# Patient Record
Sex: MALE | Race: WHITE | NOT HISPANIC OR LATINO | URBAN - METROPOLITAN AREA
[De-identification: names, ages, dates, MRNs, and addresses within clinical notes are randomized per-mention and may not be internally consistent; named-entity substitution may affect disease eponyms.]

---

## 2018-11-04 ENCOUNTER — EMERGENCY (EMERGENCY)
Facility: HOSPITAL | Age: 28
LOS: 1 days | Discharge: HOME | End: 2018-11-04
Admitting: EMERGENCY MEDICINE
Payer: COMMERCIAL

## 2018-11-04 VITALS
TEMPERATURE: 98 F | RESPIRATION RATE: 16 BRPM | HEART RATE: 98 BPM | OXYGEN SATURATION: 100 % | DIASTOLIC BLOOD PRESSURE: 90 MMHG | SYSTOLIC BLOOD PRESSURE: 140 MMHG

## 2018-11-04 LAB
ALBUMIN SERPL ELPH-MCNC: 4.6 G/DL — SIGNIFICANT CHANGE UP (ref 3.3–5)
ALP SERPL-CCNC: 75 U/L — SIGNIFICANT CHANGE UP (ref 40–120)
ALT FLD-CCNC: 25 U/L — SIGNIFICANT CHANGE UP (ref 4–41)
AST SERPL-CCNC: 20 U/L — SIGNIFICANT CHANGE UP (ref 4–40)
BASOPHILS # BLD AUTO: 0.04 K/UL — SIGNIFICANT CHANGE UP (ref 0–0.2)
BASOPHILS NFR BLD AUTO: 0.5 % — SIGNIFICANT CHANGE UP (ref 0–2)
BILIRUB SERPL-MCNC: 0.6 MG/DL — SIGNIFICANT CHANGE UP (ref 0.2–1.2)
BUN SERPL-MCNC: 14 MG/DL — SIGNIFICANT CHANGE UP (ref 7–23)
CALCIUM SERPL-MCNC: 9.7 MG/DL — SIGNIFICANT CHANGE UP (ref 8.4–10.5)
CHLORIDE SERPL-SCNC: 106 MMOL/L — SIGNIFICANT CHANGE UP (ref 98–107)
CO2 SERPL-SCNC: 23 MMOL/L — SIGNIFICANT CHANGE UP (ref 22–31)
CREAT SERPL-MCNC: 0.97 MG/DL — SIGNIFICANT CHANGE UP (ref 0.5–1.3)
EOSINOPHIL # BLD AUTO: 0.15 K/UL — SIGNIFICANT CHANGE UP (ref 0–0.5)
EOSINOPHIL NFR BLD AUTO: 1.7 % — SIGNIFICANT CHANGE UP (ref 0–6)
GLUCOSE SERPL-MCNC: 99 MG/DL — SIGNIFICANT CHANGE UP (ref 70–99)
HCT VFR BLD CALC: 47.5 % — SIGNIFICANT CHANGE UP (ref 39–50)
HGB BLD-MCNC: 16.7 G/DL — SIGNIFICANT CHANGE UP (ref 13–17)
IMM GRANULOCYTES # BLD AUTO: 0.02 # — SIGNIFICANT CHANGE UP
IMM GRANULOCYTES NFR BLD AUTO: 0.2 % — SIGNIFICANT CHANGE UP (ref 0–1.5)
LIDOCAIN IGE QN: 15.3 U/L — SIGNIFICANT CHANGE UP (ref 7–60)
LYMPHOCYTES # BLD AUTO: 1.86 K/UL — SIGNIFICANT CHANGE UP (ref 1–3.3)
LYMPHOCYTES # BLD AUTO: 21 % — SIGNIFICANT CHANGE UP (ref 13–44)
MCHC RBC-ENTMCNC: 30.3 PG — SIGNIFICANT CHANGE UP (ref 27–34)
MCHC RBC-ENTMCNC: 35.2 % — SIGNIFICANT CHANGE UP (ref 32–36)
MCV RBC AUTO: 86.2 FL — SIGNIFICANT CHANGE UP (ref 80–100)
MONOCYTES # BLD AUTO: 0.56 K/UL — SIGNIFICANT CHANGE UP (ref 0–0.9)
MONOCYTES NFR BLD AUTO: 6.3 % — SIGNIFICANT CHANGE UP (ref 2–14)
NEUTROPHILS # BLD AUTO: 6.21 K/UL — SIGNIFICANT CHANGE UP (ref 1.8–7.4)
NEUTROPHILS NFR BLD AUTO: 70.3 % — SIGNIFICANT CHANGE UP (ref 43–77)
NRBC # FLD: 0 — SIGNIFICANT CHANGE UP
PLATELET # BLD AUTO: 261 K/UL — SIGNIFICANT CHANGE UP (ref 150–400)
PMV BLD: 10.9 FL — SIGNIFICANT CHANGE UP (ref 7–13)
POTASSIUM SERPL-MCNC: 4 MMOL/L — SIGNIFICANT CHANGE UP (ref 3.5–5.3)
POTASSIUM SERPL-SCNC: 4 MMOL/L — SIGNIFICANT CHANGE UP (ref 3.5–5.3)
PROT SERPL-MCNC: 7.7 G/DL — SIGNIFICANT CHANGE UP (ref 6–8.3)
RBC # BLD: 5.5 M/UL — SIGNIFICANT CHANGE UP (ref 4.2–5.8)
RBC # FLD: 11.8 % — SIGNIFICANT CHANGE UP (ref 10.3–14.5)
SODIUM SERPL-SCNC: 142 MMOL/L — SIGNIFICANT CHANGE UP (ref 135–145)
WBC # BLD: 8.84 K/UL — SIGNIFICANT CHANGE UP (ref 3.8–10.5)
WBC # FLD AUTO: 8.84 K/UL — SIGNIFICANT CHANGE UP (ref 3.8–10.5)

## 2018-11-04 PROCEDURE — 71046 X-RAY EXAM CHEST 2 VIEWS: CPT | Mod: 26

## 2018-11-04 PROCEDURE — 99284 EMERGENCY DEPT VISIT MOD MDM: CPT

## 2018-11-04 PROCEDURE — 70360 X-RAY EXAM OF NECK: CPT | Mod: 26

## 2018-11-04 RX ORDER — SODIUM CHLORIDE 9 MG/ML
1000 INJECTION INTRAMUSCULAR; INTRAVENOUS; SUBCUTANEOUS ONCE
Qty: 0 | Refills: 0 | Status: COMPLETED | OUTPATIENT
Start: 2018-11-04 | End: 2018-11-04

## 2018-11-04 RX ORDER — FAMOTIDINE 10 MG/ML
20 INJECTION INTRAVENOUS ONCE
Qty: 0 | Refills: 0 | Status: COMPLETED | OUTPATIENT
Start: 2018-11-04 | End: 2018-11-04

## 2018-11-04 RX ORDER — OMEPRAZOLE 10 MG/1
1 CAPSULE, DELAYED RELEASE ORAL
Qty: 30 | Refills: 0
Start: 2018-11-04 | End: 2018-12-03

## 2018-11-04 RX ADMIN — SODIUM CHLORIDE 1000 MILLILITER(S): 9 INJECTION INTRAMUSCULAR; INTRAVENOUS; SUBCUTANEOUS at 16:22

## 2018-11-04 RX ADMIN — Medication 30 MILLILITER(S): at 16:22

## 2018-11-04 RX ADMIN — FAMOTIDINE 20 MILLIGRAM(S): 10 INJECTION INTRAVENOUS at 16:22

## 2018-11-04 NOTE — ED ADULT TRIAGE NOTE - CHIEF COMPLAINT QUOTE
ch pains intermittent x past 10 days pain  increases upon eating. states burned esophagus   2 wks ago.intermittent nause. denies vomiting.

## 2018-11-04 NOTE — ED PROVIDER NOTE - OBJECTIVE STATEMENT
29 y/o male no pmh c/o epigastric/chest dylan nx10 days. pt admits to burning type pain after eating large meals. Pt admits to similar symptoms in the past, usually controlled with tums however that has not been helping now. Pt also admits to feeling intermittent "lump in my throat". Pt admits to lack of appetite x1 day. Denies sob, difficulty swallowing, n/v/d, numbness ,tingling, weakness, dizziness, syncope, fever or chills. Denies hematemesis, melena or hemoptysis.

## 2018-11-04 NOTE — ED PROVIDER NOTE - MEDICAL DECISION MAKING DETAILS
29 y/o male w/ burning type epigastric/chest pain after eating x10 days- labs, pepcid, maalox, cxr, GI f/u.

## 2021-05-17 ENCOUNTER — APPOINTMENT (OUTPATIENT)
Dept: DISASTER EMERGENCY | Facility: OTHER | Age: 31
End: 2021-05-17
Payer: COMMERCIAL

## 2021-05-17 PROCEDURE — 0012A: CPT

## 2021-12-14 ENCOUNTER — OUTPATIENT (OUTPATIENT)
Dept: OUTPATIENT SERVICES | Facility: HOSPITAL | Age: 31
LOS: 1 days | Discharge: STILL ADMITTED OR ADMINISTRATIVE | End: 2021-12-14
Payer: COMMERCIAL

## 2021-12-14 ENCOUNTER — INPATIENT (INPATIENT)
Facility: HOSPITAL | Age: 31
LOS: 1 days | Discharge: HOME | End: 2021-12-16
Attending: STUDENT IN AN ORGANIZED HEALTH CARE EDUCATION/TRAINING PROGRAM | Admitting: STUDENT IN AN ORGANIZED HEALTH CARE EDUCATION/TRAINING PROGRAM
Payer: COMMERCIAL

## 2021-12-14 VITALS
DIASTOLIC BLOOD PRESSURE: 88 MMHG | SYSTOLIC BLOOD PRESSURE: 134 MMHG | RESPIRATION RATE: 16 BRPM | TEMPERATURE: 98 F | OXYGEN SATURATION: 99 % | HEART RATE: 102 BPM

## 2021-12-14 DIAGNOSIS — R10.9 UNSPECIFIED ABDOMINAL PAIN: ICD-10-CM

## 2021-12-14 LAB
ALBUMIN SERPL ELPH-MCNC: 5.1 G/DL — HIGH (ref 3.3–5)
ALP SERPL-CCNC: 84 U/L — SIGNIFICANT CHANGE UP (ref 40–120)
ALT FLD-CCNC: 22 U/L — SIGNIFICANT CHANGE UP (ref 4–41)
ANION GAP SERPL CALC-SCNC: 16 MMOL/L — HIGH (ref 7–14)
AST SERPL-CCNC: 20 U/L — SIGNIFICANT CHANGE UP (ref 4–40)
B PERT DNA SPEC QL NAA+PROBE: SIGNIFICANT CHANGE UP
B PERT DNA SPEC QL NAA+PROBE: SIGNIFICANT CHANGE UP
B PERT+PARAPERT DNA PNL SPEC NAA+PROBE: SIGNIFICANT CHANGE UP
B PERT+PARAPERT DNA PNL SPEC NAA+PROBE: SIGNIFICANT CHANGE UP
BASE EXCESS BLDV CALC-SCNC: 1.4 MMOL/L — SIGNIFICANT CHANGE UP (ref -2–3)
BASOPHILS # BLD AUTO: 0.06 K/UL — SIGNIFICANT CHANGE UP (ref 0–0.2)
BASOPHILS NFR BLD AUTO: 0.6 % — SIGNIFICANT CHANGE UP (ref 0–2)
BILIRUB SERPL-MCNC: 0.6 MG/DL — SIGNIFICANT CHANGE UP (ref 0.2–1.2)
BLOOD GAS VENOUS COMPREHENSIVE RESULT: SIGNIFICANT CHANGE UP
BORDETELLA PARAPERTUSSIS (RAPRVP): SIGNIFICANT CHANGE UP
BORDETELLA PARAPERTUSSIS (RAPRVP): SIGNIFICANT CHANGE UP
BUN SERPL-MCNC: 14 MG/DL — SIGNIFICANT CHANGE UP (ref 7–23)
C PNEUM DNA SPEC QL NAA+PROBE: SIGNIFICANT CHANGE UP
C PNEUM DNA SPEC QL NAA+PROBE: SIGNIFICANT CHANGE UP
CALCIUM SERPL-MCNC: 9.9 MG/DL — SIGNIFICANT CHANGE UP (ref 8.4–10.5)
CHLORIDE BLDV-SCNC: 102 MMOL/L — SIGNIFICANT CHANGE UP (ref 96–108)
CHLORIDE SERPL-SCNC: 102 MMOL/L — SIGNIFICANT CHANGE UP (ref 98–107)
CO2 BLDV-SCNC: 27.9 MMOL/L — HIGH (ref 22–26)
CO2 SERPL-SCNC: 23 MMOL/L — SIGNIFICANT CHANGE UP (ref 22–31)
CREAT SERPL-MCNC: 0.94 MG/DL — SIGNIFICANT CHANGE UP (ref 0.5–1.3)
EOSINOPHIL # BLD AUTO: 0.12 K/UL — SIGNIFICANT CHANGE UP (ref 0–0.5)
EOSINOPHIL NFR BLD AUTO: 1.1 % — SIGNIFICANT CHANGE UP (ref 0–6)
FLUAV SUBTYP SPEC NAA+PROBE: SIGNIFICANT CHANGE UP
FLUAV SUBTYP SPEC NAA+PROBE: SIGNIFICANT CHANGE UP
FLUBV RNA SPEC QL NAA+PROBE: SIGNIFICANT CHANGE UP
FLUBV RNA SPEC QL NAA+PROBE: SIGNIFICANT CHANGE UP
GAS PNL BLDV: 135 MMOL/L — LOW (ref 136–145)
GAS PNL BLDV: SIGNIFICANT CHANGE UP
GLUCOSE BLDV-MCNC: 103 MG/DL — HIGH (ref 70–99)
GLUCOSE SERPL-MCNC: 104 MG/DL — HIGH (ref 70–99)
HADV DNA SPEC QL NAA+PROBE: SIGNIFICANT CHANGE UP
HADV DNA SPEC QL NAA+PROBE: SIGNIFICANT CHANGE UP
HCO3 BLDV-SCNC: 27 MMOL/L — SIGNIFICANT CHANGE UP (ref 22–29)
HCOV 229E RNA SPEC QL NAA+PROBE: SIGNIFICANT CHANGE UP
HCOV 229E RNA SPEC QL NAA+PROBE: SIGNIFICANT CHANGE UP
HCOV HKU1 RNA SPEC QL NAA+PROBE: SIGNIFICANT CHANGE UP
HCOV HKU1 RNA SPEC QL NAA+PROBE: SIGNIFICANT CHANGE UP
HCOV NL63 RNA SPEC QL NAA+PROBE: SIGNIFICANT CHANGE UP
HCOV NL63 RNA SPEC QL NAA+PROBE: SIGNIFICANT CHANGE UP
HCOV OC43 RNA SPEC QL NAA+PROBE: SIGNIFICANT CHANGE UP
HCOV OC43 RNA SPEC QL NAA+PROBE: SIGNIFICANT CHANGE UP
HCT VFR BLD CALC: 50.6 % — HIGH (ref 39–50)
HCT VFR BLDA CALC: 52 % — HIGH (ref 39–51)
HGB BLD CALC-MCNC: 17.3 G/DL — HIGH (ref 13–17)
HGB BLD-MCNC: 17.4 G/DL — HIGH (ref 13–17)
HMPV RNA SPEC QL NAA+PROBE: SIGNIFICANT CHANGE UP
HMPV RNA SPEC QL NAA+PROBE: SIGNIFICANT CHANGE UP
HPIV1 RNA SPEC QL NAA+PROBE: SIGNIFICANT CHANGE UP
HPIV1 RNA SPEC QL NAA+PROBE: SIGNIFICANT CHANGE UP
HPIV2 RNA SPEC QL NAA+PROBE: SIGNIFICANT CHANGE UP
HPIV2 RNA SPEC QL NAA+PROBE: SIGNIFICANT CHANGE UP
HPIV3 RNA SPEC QL NAA+PROBE: SIGNIFICANT CHANGE UP
HPIV3 RNA SPEC QL NAA+PROBE: SIGNIFICANT CHANGE UP
HPIV4 RNA SPEC QL NAA+PROBE: SIGNIFICANT CHANGE UP
HPIV4 RNA SPEC QL NAA+PROBE: SIGNIFICANT CHANGE UP
IANC: 7.62 K/UL — SIGNIFICANT CHANGE UP (ref 1.5–8.5)
IMM GRANULOCYTES NFR BLD AUTO: 0.1 % — SIGNIFICANT CHANGE UP (ref 0–1.5)
LACTATE BLDV-MCNC: 1.6 MMOL/L — SIGNIFICANT CHANGE UP (ref 0.5–2)
LIDOCAIN IGE QN: 19 U/L — SIGNIFICANT CHANGE UP (ref 7–60)
LYMPHOCYTES # BLD AUTO: 19.5 % — SIGNIFICANT CHANGE UP (ref 13–44)
LYMPHOCYTES # BLD AUTO: 2.04 K/UL — SIGNIFICANT CHANGE UP (ref 1–3.3)
M PNEUMO DNA SPEC QL NAA+PROBE: SIGNIFICANT CHANGE UP
M PNEUMO DNA SPEC QL NAA+PROBE: SIGNIFICANT CHANGE UP
MCHC RBC-ENTMCNC: 29.8 PG — SIGNIFICANT CHANGE UP (ref 27–34)
MCHC RBC-ENTMCNC: 34.4 GM/DL — SIGNIFICANT CHANGE UP (ref 32–36)
MCV RBC AUTO: 86.8 FL — SIGNIFICANT CHANGE UP (ref 80–100)
MONOCYTES # BLD AUTO: 0.63 K/UL — SIGNIFICANT CHANGE UP (ref 0–0.9)
MONOCYTES NFR BLD AUTO: 6 % — SIGNIFICANT CHANGE UP (ref 2–14)
NEUTROPHILS # BLD AUTO: 7.62 K/UL — HIGH (ref 1.8–7.4)
NEUTROPHILS NFR BLD AUTO: 72.7 % — SIGNIFICANT CHANGE UP (ref 43–77)
NRBC # BLD: 0 /100 WBCS — SIGNIFICANT CHANGE UP
NRBC # FLD: 0 K/UL — SIGNIFICANT CHANGE UP
PCO2 BLDV: 43 MMHG — SIGNIFICANT CHANGE UP (ref 42–55)
PH BLDV: 7.4 — SIGNIFICANT CHANGE UP (ref 7.32–7.43)
PLATELET # BLD AUTO: 326 K/UL — SIGNIFICANT CHANGE UP (ref 150–400)
PO2 BLDV: 51 MMHG — SIGNIFICANT CHANGE UP
POTASSIUM BLDV-SCNC: 3.7 MMOL/L — SIGNIFICANT CHANGE UP (ref 3.5–5.1)
POTASSIUM SERPL-MCNC: 3.8 MMOL/L — SIGNIFICANT CHANGE UP (ref 3.5–5.3)
POTASSIUM SERPL-SCNC: 3.8 MMOL/L — SIGNIFICANT CHANGE UP (ref 3.5–5.3)
PROT SERPL-MCNC: 7.7 G/DL — SIGNIFICANT CHANGE UP (ref 6–8.3)
RAPID RVP RESULT: SIGNIFICANT CHANGE UP
RAPID RVP RESULT: SIGNIFICANT CHANGE UP
RBC # BLD: 5.83 M/UL — HIGH (ref 4.2–5.8)
RBC # FLD: 12.2 % — SIGNIFICANT CHANGE UP (ref 10.3–14.5)
RSV RNA SPEC QL NAA+PROBE: SIGNIFICANT CHANGE UP
RSV RNA SPEC QL NAA+PROBE: SIGNIFICANT CHANGE UP
RV+EV RNA SPEC QL NAA+PROBE: SIGNIFICANT CHANGE UP
RV+EV RNA SPEC QL NAA+PROBE: SIGNIFICANT CHANGE UP
SAO2 % BLDV: 83.7 % — SIGNIFICANT CHANGE UP
SARS-COV-2 RNA SPEC QL NAA+PROBE: SIGNIFICANT CHANGE UP
SARS-COV-2 RNA SPEC QL NAA+PROBE: SIGNIFICANT CHANGE UP
SODIUM SERPL-SCNC: 141 MMOL/L — SIGNIFICANT CHANGE UP (ref 135–145)
WBC # BLD: 10.48 K/UL — SIGNIFICANT CHANGE UP (ref 3.8–10.5)
WBC # FLD AUTO: 10.48 K/UL — SIGNIFICANT CHANGE UP (ref 3.8–10.5)

## 2021-12-14 PROCEDURE — 93010 ELECTROCARDIOGRAM REPORT: CPT

## 2021-12-14 PROCEDURE — 99215 OFFICE O/P EST HI 40 MIN: CPT

## 2021-12-14 PROCEDURE — 76705 ECHO EXAM OF ABDOMEN: CPT | Mod: 26

## 2021-12-14 PROCEDURE — 71046 X-RAY EXAM CHEST 2 VIEWS: CPT | Mod: 26

## 2021-12-14 PROCEDURE — 71260 CT THORAX DX C+: CPT | Mod: 26,MA

## 2021-12-14 PROCEDURE — 74177 CT ABD & PELVIS W/CONTRAST: CPT | Mod: 26,MA

## 2021-12-14 PROCEDURE — 99285 EMERGENCY DEPT VISIT HI MDM: CPT | Mod: 25

## 2021-12-14 RX ORDER — FAMOTIDINE 10 MG/ML
20 INJECTION INTRAVENOUS ONCE
Refills: 0 | Status: COMPLETED | OUTPATIENT
Start: 2021-12-14 | End: 2021-12-14

## 2021-12-14 RX ORDER — ONDANSETRON 8 MG/1
4 TABLET, FILM COATED ORAL ONCE
Refills: 0 | Status: COMPLETED | OUTPATIENT
Start: 2021-12-14 | End: 2021-12-14

## 2021-12-14 RX ORDER — SODIUM CHLORIDE 9 MG/ML
1000 INJECTION INTRAMUSCULAR; INTRAVENOUS; SUBCUTANEOUS ONCE
Refills: 0 | Status: COMPLETED | OUTPATIENT
Start: 2021-12-14 | End: 2021-12-14

## 2021-12-14 RX ORDER — SUCRALFATE 1 G
1 TABLET ORAL ONCE
Refills: 0 | Status: COMPLETED | OUTPATIENT
Start: 2021-12-14 | End: 2021-12-14

## 2021-12-14 RX ADMIN — SODIUM CHLORIDE 1000 MILLILITER(S): 9 INJECTION INTRAMUSCULAR; INTRAVENOUS; SUBCUTANEOUS at 22:07

## 2021-12-14 RX ADMIN — ONDANSETRON 4 MILLIGRAM(S): 8 TABLET, FILM COATED ORAL at 15:33

## 2021-12-14 RX ADMIN — Medication 1 GRAM(S): at 15:33

## 2021-12-14 RX ADMIN — SODIUM CHLORIDE 1000 MILLILITER(S): 9 INJECTION INTRAMUSCULAR; INTRAVENOUS; SUBCUTANEOUS at 15:33

## 2021-12-14 RX ADMIN — FAMOTIDINE 20 MILLIGRAM(S): 10 INJECTION INTRAVENOUS at 15:33

## 2021-12-14 NOTE — ED PROVIDER NOTE - CLINICAL SUMMARY MEDICAL DECISION MAKING FREE TEXT BOX
Pt w/ hx of esophageal balloon dilation here w/ abdominal pain and progressive not tolerating PO. VSS. Exam w/ mild epigastric ttp. Concern for shakira vs pancreatitis vs PUD vs achalasia vs gastritis vs hiatal hernia. Will obtain labs, lipase, ekg, CXR, GI cocktail, fluids. Reassess.

## 2021-12-14 NOTE — ED ADULT NURSE NOTE - OBJECTIVE STATEMENT
Pt received from US to intake room 9 A&XO4 ambulatory c/o epigastric pain x 1 week. Pt appears very anxious. Pt endorsing palpitations. Pt endorsing N/V, difficulty tolerating PO. Resp even and unlabored. 20g IV placed in LAC. Labs drawn and sent. Medicated as per EMAR. EKG in progress.

## 2021-12-14 NOTE — ED ADULT TRIAGE NOTE - CHIEF COMPLAINT QUOTE
Pt c/o mid-epigastric pain that started X 2 weeks ago. Has been taking antacids, with no relief. Was suppose to see GI today but due to pain, came to ED. Endorsing nausea and X 4 episodes of vomit yesterday. Phx: asthma

## 2021-12-14 NOTE — ED PROVIDER NOTE - PROGRESS NOTE DETAILS
SCOTTIE Rosas (PGY-2) - CTs w/o obstructive masses or surgical pathology. GI consulted for endoscopy, will see inpatient. Will admit.

## 2021-12-14 NOTE — ED PROVIDER NOTE - ATTENDING CONTRIBUTION TO CARE
I performed a face to face evaluation of this patient and performed a full history and physical examination on the patient.  I agree with the resident's history, physical examination, and plan of the patient.Pt w/ hx of esophageal balloon dilation here w/ abdominal pain and progressive not tolerating PO. VSS. Exam w/ mild epigastric ttp. Concern for shakira vs pancreatitis vs PUD vs achalasia vs gastritis vs hiatal hernia. Will obtain labs, lipase, ekg, CXR, GI cocktail, fluids. Reassess.  Abd no rebound, nondisteded, heart and lung wnl

## 2021-12-15 ENCOUNTER — RESULT REVIEW (OUTPATIENT)
Age: 31
End: 2021-12-15

## 2021-12-15 DIAGNOSIS — Z29.9 ENCOUNTER FOR PROPHYLACTIC MEASURES, UNSPECIFIED: ICD-10-CM

## 2021-12-15 DIAGNOSIS — Z98.890 OTHER SPECIFIED POSTPROCEDURAL STATES: Chronic | ICD-10-CM

## 2021-12-15 DIAGNOSIS — R13.10 DYSPHAGIA, UNSPECIFIED: ICD-10-CM

## 2021-12-15 DIAGNOSIS — R07.89 OTHER CHEST PAIN: ICD-10-CM

## 2021-12-15 DIAGNOSIS — Z87.2 PERSONAL HISTORY OF DISEASES OF THE SKIN AND SUBCUTANEOUS TISSUE: Chronic | ICD-10-CM

## 2021-12-15 DIAGNOSIS — R00.0 TACHYCARDIA, UNSPECIFIED: ICD-10-CM

## 2021-12-15 DIAGNOSIS — F12.10 CANNABIS ABUSE, UNCOMPLICATED: ICD-10-CM

## 2021-12-15 DIAGNOSIS — F41.9 ANXIETY DISORDER, UNSPECIFIED: ICD-10-CM

## 2021-12-15 DIAGNOSIS — J45.909 UNSPECIFIED ASTHMA, UNCOMPLICATED: ICD-10-CM

## 2021-12-15 DIAGNOSIS — F06.4 ANXIETY DISORDER DUE TO KNOWN PHYSIOLOGICAL CONDITION: ICD-10-CM

## 2021-12-15 LAB
A1C WITH ESTIMATED AVERAGE GLUCOSE RESULT: 4.9 % — SIGNIFICANT CHANGE UP (ref 4–5.6)
ALBUMIN SERPL ELPH-MCNC: 4.7 G/DL — SIGNIFICANT CHANGE UP (ref 3.3–5)
ALP SERPL-CCNC: 78 U/L — SIGNIFICANT CHANGE UP (ref 40–120)
ALT FLD-CCNC: 16 U/L — SIGNIFICANT CHANGE UP (ref 4–41)
ANION GAP SERPL CALC-SCNC: 13 MMOL/L — SIGNIFICANT CHANGE UP (ref 7–14)
AST SERPL-CCNC: 14 U/L — SIGNIFICANT CHANGE UP (ref 4–40)
BASOPHILS # BLD AUTO: 0.05 K/UL — SIGNIFICANT CHANGE UP (ref 0–0.2)
BASOPHILS NFR BLD AUTO: 0.6 % — SIGNIFICANT CHANGE UP (ref 0–2)
BILIRUB SERPL-MCNC: 0.9 MG/DL — SIGNIFICANT CHANGE UP (ref 0.2–1.2)
BUN SERPL-MCNC: 13 MG/DL — SIGNIFICANT CHANGE UP (ref 7–23)
CALCIUM SERPL-MCNC: 9.7 MG/DL — SIGNIFICANT CHANGE UP (ref 8.4–10.5)
CHLORIDE SERPL-SCNC: 102 MMOL/L — SIGNIFICANT CHANGE UP (ref 98–107)
CHOLEST SERPL-MCNC: 170 MG/DL — SIGNIFICANT CHANGE UP
CK MB BLD-MCNC: 2.4 % — SIGNIFICANT CHANGE UP (ref 0–2.5)
CK MB CFR SERPL CALC: 1.3 NG/ML — SIGNIFICANT CHANGE UP
CK SERPL-CCNC: 55 U/L — SIGNIFICANT CHANGE UP (ref 30–200)
CO2 SERPL-SCNC: 25 MMOL/L — SIGNIFICANT CHANGE UP (ref 22–31)
CREAT SERPL-MCNC: 0.97 MG/DL — SIGNIFICANT CHANGE UP (ref 0.5–1.3)
EOSINOPHIL # BLD AUTO: 0.2 K/UL — SIGNIFICANT CHANGE UP (ref 0–0.5)
EOSINOPHIL NFR BLD AUTO: 2.3 % — SIGNIFICANT CHANGE UP (ref 0–6)
ESTIMATED AVERAGE GLUCOSE: 94 — SIGNIFICANT CHANGE UP
GLUCOSE BLDC GLUCOMTR-MCNC: 101 MG/DL — HIGH (ref 70–99)
GLUCOSE SERPL-MCNC: 96 MG/DL — SIGNIFICANT CHANGE UP (ref 70–99)
HCT VFR BLD CALC: 47.7 % — SIGNIFICANT CHANGE UP (ref 39–50)
HDLC SERPL-MCNC: 31 MG/DL — LOW
HGB BLD-MCNC: 16.3 G/DL — SIGNIFICANT CHANGE UP (ref 13–17)
IANC: 5.57 K/UL — SIGNIFICANT CHANGE UP (ref 1.5–8.5)
IMM GRANULOCYTES NFR BLD AUTO: 0.2 % — SIGNIFICANT CHANGE UP (ref 0–1.5)
LIPID PNL WITH DIRECT LDL SERPL: 125 MG/DL — HIGH
LYMPHOCYTES # BLD AUTO: 1.94 K/UL — SIGNIFICANT CHANGE UP (ref 1–3.3)
LYMPHOCYTES # BLD AUTO: 22.7 % — SIGNIFICANT CHANGE UP (ref 13–44)
MAGNESIUM SERPL-MCNC: 2 MG/DL — SIGNIFICANT CHANGE UP (ref 1.6–2.6)
MCHC RBC-ENTMCNC: 29.9 PG — SIGNIFICANT CHANGE UP (ref 27–34)
MCHC RBC-ENTMCNC: 34.2 GM/DL — SIGNIFICANT CHANGE UP (ref 32–36)
MCV RBC AUTO: 87.5 FL — SIGNIFICANT CHANGE UP (ref 80–100)
MONOCYTES # BLD AUTO: 0.76 K/UL — SIGNIFICANT CHANGE UP (ref 0–0.9)
MONOCYTES NFR BLD AUTO: 8.9 % — SIGNIFICANT CHANGE UP (ref 2–14)
NEUTROPHILS # BLD AUTO: 5.57 K/UL — SIGNIFICANT CHANGE UP (ref 1.8–7.4)
NEUTROPHILS NFR BLD AUTO: 65.3 % — SIGNIFICANT CHANGE UP (ref 43–77)
NON HDL CHOLESTEROL: 139 MG/DL — HIGH
NRBC # BLD: 0 /100 WBCS — SIGNIFICANT CHANGE UP
NRBC # FLD: 0 K/UL — SIGNIFICANT CHANGE UP
PCP SPEC-MCNC: SIGNIFICANT CHANGE UP
PHOSPHATE SERPL-MCNC: 3.6 MG/DL — SIGNIFICANT CHANGE UP (ref 2.5–4.5)
PLATELET # BLD AUTO: 284 K/UL — SIGNIFICANT CHANGE UP (ref 150–400)
POTASSIUM SERPL-MCNC: 3.7 MMOL/L — SIGNIFICANT CHANGE UP (ref 3.5–5.3)
POTASSIUM SERPL-SCNC: 3.7 MMOL/L — SIGNIFICANT CHANGE UP (ref 3.5–5.3)
PROT SERPL-MCNC: 6.8 G/DL — SIGNIFICANT CHANGE UP (ref 6–8.3)
RBC # BLD: 5.45 M/UL — SIGNIFICANT CHANGE UP (ref 4.2–5.8)
RBC # FLD: 12.3 % — SIGNIFICANT CHANGE UP (ref 10.3–14.5)
SODIUM SERPL-SCNC: 140 MMOL/L — SIGNIFICANT CHANGE UP (ref 135–145)
TRIGL SERPL-MCNC: 70 MG/DL — SIGNIFICANT CHANGE UP
TROPONIN T, HIGH SENSITIVITY RESULT: <6 NG/L — SIGNIFICANT CHANGE UP
TSH SERPL-MCNC: 1.69 UIU/ML — SIGNIFICANT CHANGE UP (ref 0.27–4.2)
WBC # BLD: 8.54 K/UL — SIGNIFICANT CHANGE UP (ref 3.8–10.5)
WBC # FLD AUTO: 8.54 K/UL — SIGNIFICANT CHANGE UP (ref 3.8–10.5)

## 2021-12-15 PROCEDURE — 99233 SBSQ HOSP IP/OBS HIGH 50: CPT

## 2021-12-15 PROCEDURE — 43239 EGD BIOPSY SINGLE/MULTIPLE: CPT | Mod: GC

## 2021-12-15 PROCEDURE — 99222 1ST HOSP IP/OBS MODERATE 55: CPT | Mod: GC,25

## 2021-12-15 PROCEDURE — 99223 1ST HOSP IP/OBS HIGH 75: CPT | Mod: GC

## 2021-12-15 PROCEDURE — 88305 TISSUE EXAM BY PATHOLOGIST: CPT | Mod: 26

## 2021-12-15 PROCEDURE — 90792 PSYCH DIAG EVAL W/MED SRVCS: CPT

## 2021-12-15 PROCEDURE — 93306 TTE W/DOPPLER COMPLETE: CPT | Mod: 26

## 2021-12-15 RX ORDER — ONDANSETRON 8 MG/1
4 TABLET, FILM COATED ORAL EVERY 8 HOURS
Refills: 0 | Status: DISCONTINUED | OUTPATIENT
Start: 2021-12-15 | End: 2021-12-16

## 2021-12-15 RX ORDER — ENOXAPARIN SODIUM 100 MG/ML
40 INJECTION SUBCUTANEOUS DAILY
Refills: 0 | Status: DISCONTINUED | OUTPATIENT
Start: 2021-12-15 | End: 2021-12-16

## 2021-12-15 RX ORDER — SODIUM CHLORIDE 9 MG/ML
1000 INJECTION, SOLUTION INTRAVENOUS
Refills: 0 | Status: DISCONTINUED | OUTPATIENT
Start: 2021-12-15 | End: 2021-12-15

## 2021-12-15 RX ORDER — ESCITALOPRAM OXALATE 10 MG/1
10 TABLET, FILM COATED ORAL DAILY
Refills: 0 | Status: DISCONTINUED | OUTPATIENT
Start: 2021-12-15 | End: 2021-12-16

## 2021-12-15 RX ORDER — ACETAMINOPHEN 500 MG
650 TABLET ORAL EVERY 6 HOURS
Refills: 0 | Status: DISCONTINUED | OUTPATIENT
Start: 2021-12-15 | End: 2021-12-16

## 2021-12-15 RX ORDER — PANTOPRAZOLE SODIUM 20 MG/1
40 TABLET, DELAYED RELEASE ORAL
Refills: 0 | Status: DISCONTINUED | OUTPATIENT
Start: 2021-12-15 | End: 2021-12-16

## 2021-12-15 RX ORDER — SODIUM CHLORIDE 9 MG/ML
1000 INJECTION, SOLUTION INTRAVENOUS
Refills: 0 | Status: COMPLETED | OUTPATIENT
Start: 2021-12-15 | End: 2021-12-15

## 2021-12-15 RX ADMIN — PANTOPRAZOLE SODIUM 40 MILLIGRAM(S): 20 TABLET, DELAYED RELEASE ORAL at 06:19

## 2021-12-15 RX ADMIN — ESCITALOPRAM OXALATE 10 MILLIGRAM(S): 10 TABLET, FILM COATED ORAL at 15:45

## 2021-12-15 RX ADMIN — ENOXAPARIN SODIUM 40 MILLIGRAM(S): 100 INJECTION SUBCUTANEOUS at 19:10

## 2021-12-15 RX ADMIN — Medication 650 MILLIGRAM(S): at 03:40

## 2021-12-15 RX ADMIN — Medication 1 MILLIGRAM(S): at 15:44

## 2021-12-15 RX ADMIN — Medication 650 MILLIGRAM(S): at 02:59

## 2021-12-15 RX ADMIN — SODIUM CHLORIDE 75 MILLILITER(S): 9 INJECTION, SOLUTION INTRAVENOUS at 01:48

## 2021-12-15 NOTE — BH CONSULTATION LIAISON ASSESSMENT NOTE - RISK ASSESSMENT
has chronic risks-- male, anxiety, struggles with coping with stressors, pain, poor sleep, substance abuse, denies any si or hi, no psychosis, no agitation  collateral- pending  No history of inpatient psych admission as per patient, no known history of si or sa.

## 2021-12-15 NOTE — BH CONSULTATION LIAISON ASSESSMENT NOTE - CURRENT MEDICATION
MEDICATIONS  (STANDING):  enoxaparin Injectable 40 milliGRAM(s) SubCutaneous daily  escitalopram 10 milliGRAM(s) Oral daily  lactated ringers. 1000 milliLiter(s) (75 mL/Hr) IV Continuous <Continuous>  pantoprazole    Tablet 40 milliGRAM(s) Oral before breakfast    MEDICATIONS  (PRN):  acetaminophen     Tablet .. 650 milliGRAM(s) Oral every 6 hours PRN Temp greater or equal to 38C (100.4F), Mild Pain (1 - 3), Moderate Pain (4 - 6)  ondansetron Injectable 4 milliGRAM(s) IV Push every 8 hours PRN Nausea and/or Vomiting

## 2021-12-15 NOTE — H&P ADULT - HISTORY OF PRESENT ILLNESS
This is a 30yo male w/ PMH of anxiety, GERD, asthma and esophageal stricture s/p balloon dilation in 2018. Patient presenting w/ 2 weeks of worsening N/V following eating solid foods, states he feels like "something gets stuck in his chest". The feeling is worse w. lying down and has lead to a decreased po intake. Patient endorsed 3/10 epigastric pain w/ increased belching. Patient also describes episodes of chest tightness palpitations, SOB, sweating, blurry vision, and dizziness that also started 2 weeks ago that sometimes wake him from sleep. Patient also described using his albuterol inhaler 3-4 times a day to try to help with episodes. Also patient states he tried using marijuana that also didnt help. patient has been taking an old Klonopin prescription 0.25mg for the past 4 days which also did not help. Patient also describes feelings hopeless and wishing he were dead rather than have these episodes. Patient denies current suicidal ideation and states he "feels better now that hes at the hospital and people will help". Of note patient w/ resent job loss 2/2 missing work due to these episodes and that 2 weeks ago the "feds came and took his brother in law away" patient states he is not bothered by this. Patient was unable to elaborate further, writer is unsure if this is a true story.

## 2021-12-15 NOTE — H&P ADULT - PROBLEM SELECTOR PLAN 4
- patient currently denying SI or plan, no need for 1:1  - stressors at home, possible worsening anxiety   -  home lexapro  - dc self administered Klonipin  - psych consult in am - patient currently denying SI or plan, no need for 1:1  - stressors at home, possible worsening anxiety   -  home lexapro  - dc self administered Klonipin  - tox screen  - psych consult in am

## 2021-12-15 NOTE — H&P ADULT - NSHPLABSRESULTS_GEN_ALL_CORE
Labs:                        17.4   10.48 )-----------( 326      ( 14 Dec 2021 15:59 )             50.6     12-14    141  |  102  |  14  ----------------------------<  104<H>  3.8   |  23  |  0.94    Ca    9.9      14 Dec 2021 15:59    TPro  7.7  /  Alb  5.1<H>  /  TBili  0.6  /  DBili  x   /  AST  20  /  ALT  22  /  AlkPhos  84  12-14    Blood Gas Venous:  pH: 7.40 | HCO3: 27 | pCO2: 43 | pO2: 51 | Lactate: 1.6 (12-14-21 @ 15:57)      Urinanalysis Basic (12-15-21 @ 01:48):    CAPILLARY BLOOD GLUCOSE:  POCT Blood Glucose: 101 mg/dL (12-15-21 @ 00:29)      COVID-19/Full RVP Panel:    12-14-21 @ 22:02  Adenovirus: NotDetec  CHlamydia pneumoniae: NotDetec  Coronavirus (229E, KHU1, NL63, OC43): --  Entero/Rhinovirus: NotDetec  hMPV: NotDetec  Influenza A: NotDetec  Influenza AH1: --  Influenza AH1 2009: --  Influenza AH3: --  Influenza B: NotDetec  Mycoplasma pneumoniae: NotDetec  Parainfluenza 1: NotDetec  Parainfluenza 2: NotDetec  Parainfluenza 3: NotDetec  Parainfluenza 4: NotDetec  Rapid RVP Results: NotDetec  Resp Syncytial Virus: NotDetec  SARS-CoV-2: NotDetec  12-14-21 @ 10:40  Adenovirus: NotDetec  CHlamydia pneumoniae: NotDetec  Coronavirus (229E, KHU1, NL63, OC43): --  Entero/Rhinovirus: NotDetec  hMPV: NotDetec  Influenza A: NotDetec  Influenza AH1: --  Influenza AH1 2009: --  Influenza AH3: --  Influenza B: NotDetec  Mycoplasma pneumoniae: NotDetec  Parainfluenza 1: NotDetec  Parainfluenza 2: NotDetec  Parainfluenza 3: NotDetec  Parainfluenza 4: NotDetec  Rapid RVP Results: NotDetec  Resp Syncytial Virus: NotDetec  SARS-CoV-2: NotDetec    < from: Xray Chest 2 Views PA/Lat (12.14.21 @ 14:43) >    IMPRESSION:  Clear lungs.    No hiatus hernia seen.    < end of copied text >    < from: US Abdomen Upper Quadrant Right (12.14.21 @ 15:09) >    IMPRESSION:    Unremarkable right upper quadrant abdominal ultrasound.    < end of copied text >    < from: CT Chest w/ IV Cont (12.14.21 @ 18:55) >    IMPRESSION:    No acute pulmonary disease.    No acute process within the abdomen and pelvis or findings to account for   etiology of symptoms.    < end of copied text >

## 2021-12-15 NOTE — BH CONSULTATION LIAISON ASSESSMENT NOTE - DETAILS
mother: bipolar disorder, had a h/o cutting behaviors CP, difficulty swallowing passive thoughts of 'how can I live like this'. No si or sa

## 2021-12-15 NOTE — H&P ADULT - PROBLEM SELECTOR PLAN 3
-possible dehydration 2/2 por po intake, trial IVF  -increased albuterol use vs worsening anxiety   -monitor HR -possible dehydration 2/2 por po intake, trial IVF w/ improvement  -increased albuterol use vs worsening anxiety   -orthostatics  -monitor HR -possible dehydration 2/2 por po intake, trial IVF w/ improvement  -increased albuterol use vs worsening anxiety   -TSH  -orthostatics  -monitor HR

## 2021-12-15 NOTE — CONSULT NOTE ADULT - SUBJECTIVE AND OBJECTIVE BOX
HPI:  DIONNE WASHINGTON is a 31 year old male with history of anxiety, asthma, GERD, and esophageal stricture who presents with worsening dysphagia.    Patient has history of esophageal stricture diagnosed via EGD in [place] in 2018.  He states they had to "dilate with a balloon" but states nothing looked "bad enough for them to take a biopsy of my esophagus."  He has been relatively asymptomatic for the past 3 years, however over the past 1-2 weeks he has noticed progressive worsening esophageal dysphagia, primarily to solids.  He does state he notices "some difficultly now" with liquids, however everything passes ok.  No regurgitation or difficulty managing secretions.  Otherwise, patient denies fevers, chills, weight loss, abdominal pain, nausea, vomiting, diarrhea, melena, hematemesis, hematochezia, change in stool caliber, or family history of GI-related cancers.    ROS:   General:  No fevers, chills, night sweats  Eyes:  Good vision, no reported pain  ENT:  No sore throat, pain, runny nose  CV:  No pain, palpitations  Pulm:  No dyspnea, cough  GI:  See HPI, otherwise negative  :  No incontinence, nocturia  Muscle:  No reported pain, weakness  Neuro:  No memory problems  Psych:  No insomnia, psychosis  Endocrine:  No polyuria, polydipsia  Heme:  No petechiae, ecchymosis, easy bruisability  Skin:  No reported rash    PMHX/PSHX:    No pertinent past medical history    Anxiety    GERD (gastroesophageal reflux disease)    Asthma    History of esophageal stricture    S/P excision of lipoma    S/P balloon dilatation of esophageal stricture    H/O ingrown nail      Allergies:  Augmentin (Vomiting)      Home Medications: reviewed  Hospital Medications:  acetaminophen     Tablet .. 650 milliGRAM(s) Oral every 6 hours PRN  enoxaparin Injectable 40 milliGRAM(s) SubCutaneous daily  escitalopram 10 milliGRAM(s) Oral daily  ondansetron Injectable 4 milliGRAM(s) IV Push every 8 hours PRN  pantoprazole    Tablet 40 milliGRAM(s) Oral before breakfast      Social History:   Tobacco: Denies  EtOH: Denies  Illicit Drugs: Denies    Family history:    FHx: bipolar disorder (Mother)    FH: myocardial infarction (Father)    FH: mental illness (Mother)    FH: HTN (hypertension) (Grandparent)      Denies family history of colon cancer/polyps, stomach cancer/polyps, pancreatic cancer/masses, liver cancer/disease, ovarian cancer and endometrial cancer.    PHYSICAL EXAM:   Vital Signs:  Vital Signs Last 24 Hrs  T(C): 37.2 (15 Dec 2021 09:45), Max: 37.2 (15 Dec 2021 09:45)  T(F): 98.9 (15 Dec 2021 09:45), Max: 98.9 (15 Dec 2021 09:45)  HR: 77 (15 Dec 2021 09:45) (74 - 102)  BP: 146/90 (15 Dec 2021 09:45) (124/67 - 148/95)  BP(mean): --  RR: 19 (15 Dec 2021 09:45) (15 - 19)  SpO2: 99% (15 Dec 2021 09:45) (98% - 99%)  Daily Height in cm: 170.18 (15 Dec 2021 02:23)    Daily     GENERAL: no acute distress  NEURO: alert  HEENT: anicteric sclera, no conjunctival pallor appreciated  CHEST: no respiratory distress, no accessory muscle use  CARDIAC: regular rate, +S1/S2  ABDOMEN: soft, nondistended, nontender, no rebound or guarding  EXTREMITIES: warm, well perfused, no edema  SKIN: no lesions noted    LABS: reviewed                        16.3   8.54  )-----------( 284      ( 15 Dec 2021 04:04 )             47.7     12-15    140  |  102  |  13  ----------------------------<  96  3.7   |  25  |  0.97    Ca    9.7      15 Dec 2021 04:04  Phos  3.6     12-15  Mg     2.00     12-15    TPro  6.8  /  Alb  4.7  /  TBili  0.9  /  DBili  x   /  AST  14  /  ALT  16  /  AlkPhos  78  12-15    LIVER FUNCTIONS - ( 15 Dec 2021 04:04 )  Alb: 4.7 g/dL / Pro: 6.8 g/dL / ALK PHOS: 78 U/L / ALT: 16 U/L / AST: 14 U/L / GGT: x               Diagnostic Studies: see sunrise for full report

## 2021-12-15 NOTE — BH CONSULTATION LIAISON ASSESSMENT NOTE - NSBHCHARTREVIEWVS_PSY_A_CORE FT
Vital Signs Last 24 Hrs  T(C): 36.7 (15 Dec 2021 11:43), Max: 37.2 (15 Dec 2021 09:45)  T(F): 98 (15 Dec 2021 11:43), Max: 98.9 (15 Dec 2021 09:45)  HR: 82 (15 Dec 2021 11:43) (74 - 85)  BP: 150/92 (15 Dec 2021 11:43) (124/67 - 150/92)  BP(mean): --  RR: 18 (15 Dec 2021 11:43) (15 - 19)  SpO2: 100% (15 Dec 2021 11:43) (98% - 100%)

## 2021-12-15 NOTE — CONSULT NOTE ADULT - ASSESSMENT
31 year old male with history of anxiety, asthma, GERD, and esophageal stricture who presents with worsening dysphagia.    # Esophageal dysphagia  # History of esophageal stricture s/p dilation in 2018  Prior esophageal stricture diagnosed via EGD in [place] in 2018 s/p balloon dilation with no esophageal biopsies taken.  Relatively asymptomatic for the past 3 years, however over the past 1-2 weeks he has noticed progressive worsening esophageal dysphagia, primarily to solids.  He does state he notices "some difficultly now" with liquids, however everything passes ok.  No regurgitation or difficulty managing secretions.      Recommendations:  -keep NPO  -tentative EGD today to assess for esophageal strictures +/- dilation and biopsies      Marck Payne, PGY-4  GI/Hepatology Fellow    MONDAY-FRIDAY 8AM-5PM:  Pager# [phone] ([facility]) or [phone] ([facility])    NON-URGENT CONSULTS:  Please email [email or web address]  AT NIGHT AND ON WEEKENDS:  Contact on-call GI fellow via answering service ([phone]) from 5pm-8am and on weekends/holidays

## 2021-12-15 NOTE — H&P ADULT - ATTENDING COMMENTS
32yo male w/ PMH of anxiety, GERD, asthma and esophageal stricture s/p balloon dilation in 2018. Patient presenting w/ 2 weeks of worsening N/V following eating solid foods, globus sensation. GI eval pending. also with occasional  chest pressure/palpitations over last 2 weeks likely  associated with underlying psychiatric disorder. Would obtain psych eval in am. tft wnl, tte ordered, ekg unremarkable. Consider outpt cards eval for stress test

## 2021-12-15 NOTE — PROGRESS NOTE ADULT - PROBLEM SELECTOR PLAN 3
-possible dehydration 2/2 por po intake, trial IVF w/ improvement  -increased albuterol use vs worsening anxiety   -TSH  -orthostatics  -monitor HR -possible dehydration 2/2 por po intake, trial IVF w/ improvement  -increased albuterol use vs worsening anxiety   -TSH  -orthostatics  -monitor HR    PLAN  - f/u on orthostatics  - heart rate has been stable in the 70s and 80s today

## 2021-12-15 NOTE — H&P ADULT - NSICDXPASTSURGICALHX_GEN_ALL_CORE_FT
PAST SURGICAL HISTORY:  H/O ingrown nail     S/P balloon dilatation of esophageal stricture     S/P excision of lipoma

## 2021-12-15 NOTE — PROGRESS NOTE ADULT - SUBJECTIVE AND OBJECTIVE BOX
Jay Horn MD  PGY-1 Internal Medicine  Pager:  [phone] (ns) [phone] ([facility])  Available on Microsoft Teams  12-15-21 @ 07:20  _________________________________________________________________________________________________________________________________________    CC:      Patient is a 31y old  Male who presents with a chief complaint of Abdominal pain (15 Dec 2021 01:20)        SUBJECTIVE:    NAEO.      _________________________________________________________________________________________________________________________________________    OBJECTIVE:    Vital Signs Last 24 Hrs  T(C): 36.8 (15 Dec 2021 05:45), Max: 36.9 (14 Dec 2021 17:31)  T(F): 98.3 (15 Dec 2021 05:45), Max: 98.4 (14 Dec 2021 17:31)  HR: 85 (15 Dec 2021 05:45) (74 - 102)  BP: 133/91 (15 Dec 2021 05:45) (124/67 - 148/95)  BP(mean): --  RR: 17 (15 Dec 2021 05:45) (15 - 17)  SpO2: 99% (15 Dec 2021 05:45) (98% - 99%)    I&O's Summary      MEDICATIONS  (STANDING):  enoxaparin Injectable 40 milliGRAM(s) SubCutaneous daily  escitalopram 10 milliGRAM(s) Oral daily  pantoprazole    Tablet 40 milliGRAM(s) Oral before breakfast    MEDICATIONS  (PRN):  acetaminophen     Tablet .. 650 milliGRAM(s) Oral every 6 hours PRN Temp greater or equal to 38C (100.4F), Mild Pain (1 - 3), Moderate Pain (4 - 6)  ondansetron Injectable 4 milliGRAM(s) IV Push every 8 hours PRN Nausea and/or Vomiting        PHYSICAL EXAM:    GENERAL: Laying comfortably, NAD  EYES: EOMI, PERRL, no scleral icterus  NECK: No JVD  LUNG: Clear to auscultation bilaterally; No wheeze, crackles or rhonci  HEART: Regular rate and rhythm; No murmurs, rubs, or gallops  ABDOMEN: Soft, Nontender, Nondistended  EXTREMITIES:  No LE edema, 2+ Peripheral Pulses, No clubbing, cyanosis, or edema  PSYCH: AAOx3  NEUROLOGY: non-focal, strength 5/5 in all extremities, sensation intact  SKIN: No rashes or lesions      LABS:                            16.3   8.54  )-----------( 284      ( 15 Dec 2021 04:04 )             47.7     Auto Eosinophil # 0.20  / Auto Eosinophil % 2.3   / Auto Neutrophil # 5.57  / Auto Neutrophil % 65.3  / BANDS % x                            17.4   10.48 )-----------( 326      ( 14 Dec 2021 15:59 )             50.6     Auto Eosinophil # 0.12  / Auto Eosinophil % 1.1   / Auto Neutrophil # 7.62  / Auto Neutrophil % 72.7  / BANDS % x        12-15    140  |  102  |  13  ----------------------------<  96  3.7   |  25  |  0.97  12-14    141  |  102  |  14  ----------------------------<  104<H>  3.8   |  23  |  0.94    Ca    9.7      15 Dec 2021 04:04  Mg     2.00     12-15  Phos  3.6     12-15  TPro  6.8  /  Alb  4.7  /  TBili  0.9  /  DBili  x   /  AST  14  /  ALT  16  /  AlkPhos  78  12-15  TPro  7.7  /  Alb  5.1<H>  /  TBili  0.6  /  DBili  x   /  AST  20  /  ALT  22  /  AlkPhos  84  12-14      CARDIAC MARKERS ( 15 Dec 2021 04:04 )  x     / x     / 55 U/L / x     / 1.3 ng/mL          RADIOLOGY & ADDITIONAL TESTS:    Imaging Personally Reviewed:    Consultant(s) Notes Reviewed:      Care Discussed with Consultants/Other Providers:      _________________________________________________________________________________________________________________________________________  Jay Horn MD  PGY-1 Internal Medicine  Pager: [phone] (NS) [phone] (JANEE)  Available on Microsoft Teams  15 Dec 2021 07:20         Jay Horn MD  PGY-1 Internal Medicine  Pager:  [phone] (ns) [phone] ([facility])  Available on Microsoft Teams  12-15-21 @ 07:20  _________________________________________________________________________________________________________________________________________    CC:      Patient is a 31y old  Male who presents with a chief complaint of Abdominal pain (15 Dec 2021 01:20)        SUBJECTIVE:    NAEO.    Patient states that he came into the hospital because the feeling of food getting stuck in his throat had been happening for several weeks. Patient denies any f/c, no n/v/d/c. States that he had   _________________________________________________________________________________________________________________________________________    OBJECTIVE:    Vital Signs Last 24 Hrs  T(C): 36.8 (15 Dec 2021 05:45), Max: 36.9 (14 Dec 2021 17:31)  T(F): 98.3 (15 Dec 2021 05:45), Max: 98.4 (14 Dec 2021 17:31)  HR: 85 (15 Dec 2021 05:45) (74 - 102)  BP: 133/91 (15 Dec 2021 05:45) (124/67 - 148/95)  BP(mean): --  RR: 17 (15 Dec 2021 05:45) (15 - 17)  SpO2: 99% (15 Dec 2021 05:45) (98% - 99%)    I&O's Summary      MEDICATIONS  (STANDING):  enoxaparin Injectable 40 milliGRAM(s) SubCutaneous daily  escitalopram 10 milliGRAM(s) Oral daily  pantoprazole    Tablet 40 milliGRAM(s) Oral before breakfast    MEDICATIONS  (PRN):  acetaminophen     Tablet .. 650 milliGRAM(s) Oral every 6 hours PRN Temp greater or equal to 38C (100.4F), Mild Pain (1 - 3), Moderate Pain (4 - 6)  ondansetron Injectable 4 milliGRAM(s) IV Push every 8 hours PRN Nausea and/or Vomiting        PHYSICAL EXAM:    GENERAL: Laying comfortably, NAD  EYES: EOMI, PERRL, no scleral icterus  NECK: No JVD  LUNG: Clear to auscultation bilaterally; No wheeze, crackles or rhonci  HEART: Regular rate and rhythm; No murmurs, rubs, or gallops  ABDOMEN: Soft, Nontender, Nondistended  EXTREMITIES:  No LE edema, 2+ Peripheral Pulses, No clubbing, cyanosis, or edema  PSYCH: AAOx3  NEUROLOGY: non-focal, strength 5/5 in all extremities, sensation intact  SKIN: No rashes or lesions      LABS:                            16.3   8.54  )-----------( 284      ( 15 Dec 2021 04:04 )             47.7     Auto Eosinophil # 0.20  / Auto Eosinophil % 2.3   / Auto Neutrophil # 5.57  / Auto Neutrophil % 65.3  / BANDS % x                            17.4   10.48 )-----------( 326      ( 14 Dec 2021 15:59 )             50.6     Auto Eosinophil # 0.12  / Auto Eosinophil % 1.1   / Auto Neutrophil # 7.62  / Auto Neutrophil % 72.7  / BANDS % x        12-15    140  |  102  |  13  ----------------------------<  96  3.7   |  25  |  0.97  12-14    141  |  102  |  14  ----------------------------<  104<H>  3.8   |  23  |  0.94    Ca    9.7      15 Dec 2021 04:04  Mg     2.00     12-15  Phos  3.6     12-15  TPro  6.8  /  Alb  4.7  /  TBili  0.9  /  DBili  x   /  AST  14  /  ALT  16  /  AlkPhos  78  12-15  TPro  7.7  /  Alb  5.1<H>  /  TBili  0.6  /  DBili  x   /  AST  20  /  ALT  22  /  AlkPhos  84  12-14      CARDIAC MARKERS ( 15 Dec 2021 04:04 )  x     / x     / 55 U/L / x     / 1.3 ng/mL          RADIOLOGY & ADDITIONAL TESTS:    Imaging Personally Reviewed:    Consultant(s) Notes Reviewed:      Care Discussed with Consultants/Other Providers:      _________________________________________________________________________________________________________________________________________  Jay Horn MD  PGY-1 Internal Medicine  Pager: [phone] (NS) [phone] (JANEE)  Available on Microsoft Teams  15 Dec 2021 07:20         Jay Horn MD  PGY-1 Internal Medicine  Pager:  [phone] (ns) [phone] ([facility])  Available on Microsoft Teams  12-15-21 @ 07:20  _________________________________________________________________________________________________________________________________________    CC:      Patient is a 31y old  Male who presents with a chief complaint of Abdominal pain (15 Dec 2021 01:20)        SUBJECTIVE:    NAEO.    Patient states that he came into the hospital because the feeling of food getting stuck in his throat had been happening for several weeks. Patient denies any f/c, no n/v/d/c. States that he had some SOB and chest pain, but that   _________________________________________________________________________________________________________________________________________    OBJECTIVE:    Vital Signs Last 24 Hrs  T(C): 36.8 (15 Dec 2021 05:45), Max: 36.9 (14 Dec 2021 17:31)  T(F): 98.3 (15 Dec 2021 05:45), Max: 98.4 (14 Dec 2021 17:31)  HR: 85 (15 Dec 2021 05:45) (74 - 102)  BP: 133/91 (15 Dec 2021 05:45) (124/67 - 148/95)  BP(mean): --  RR: 17 (15 Dec 2021 05:45) (15 - 17)  SpO2: 99% (15 Dec 2021 05:45) (98% - 99%)    I&O's Summary      MEDICATIONS  (STANDING):  enoxaparin Injectable 40 milliGRAM(s) SubCutaneous daily  escitalopram 10 milliGRAM(s) Oral daily  pantoprazole    Tablet 40 milliGRAM(s) Oral before breakfast    MEDICATIONS  (PRN):  acetaminophen     Tablet .. 650 milliGRAM(s) Oral every 6 hours PRN Temp greater or equal to 38C (100.4F), Mild Pain (1 - 3), Moderate Pain (4 - 6)  ondansetron Injectable 4 milliGRAM(s) IV Push every 8 hours PRN Nausea and/or Vomiting        PHYSICAL EXAM:    GENERAL: Laying comfortably, NAD  EYES: EOMI, PERRL, no scleral icterus  NECK: No JVD  LUNG: Clear to auscultation bilaterally; No wheeze, crackles or rhonci  HEART: Regular rate and rhythm; No murmurs, rubs, or gallops  ABDOMEN: Soft, Nontender, Nondistended  EXTREMITIES:  No LE edema, 2+ Peripheral Pulses, No clubbing, cyanosis, or edema  PSYCH: AAOx3  NEUROLOGY: non-focal, strength 5/5 in all extremities, sensation intact  SKIN: No rashes or lesions      LABS:                            16.3   8.54  )-----------( 284      ( 15 Dec 2021 04:04 )             47.7     Auto Eosinophil # 0.20  / Auto Eosinophil % 2.3   / Auto Neutrophil # 5.57  / Auto Neutrophil % 65.3  / BANDS % x                            17.4   10.48 )-----------( 326      ( 14 Dec 2021 15:59 )             50.6     Auto Eosinophil # 0.12  / Auto Eosinophil % 1.1   / Auto Neutrophil # 7.62  / Auto Neutrophil % 72.7  / BANDS % x        12-15    140  |  102  |  13  ----------------------------<  96  3.7   |  25  |  0.97  12-14    141  |  102  |  14  ----------------------------<  104<H>  3.8   |  23  |  0.94    Ca    9.7      15 Dec 2021 04:04  Mg     2.00     12-15  Phos  3.6     12-15  TPro  6.8  /  Alb  4.7  /  TBili  0.9  /  DBili  x   /  AST  14  /  ALT  16  /  AlkPhos  78  12-15  TPro  7.7  /  Alb  5.1<H>  /  TBili  0.6  /  DBili  x   /  AST  20  /  ALT  22  /  AlkPhos  84  12-14      CARDIAC MARKERS ( 15 Dec 2021 04:04 )  x     / x     / 55 U/L / x     / 1.3 ng/mL          RADIOLOGY & ADDITIONAL TESTS:    Imaging Personally Reviewed:    Consultant(s) Notes Reviewed:      Care Discussed with Consultants/Other Providers:      _________________________________________________________________________________________________________________________________________  Jay Horn MD  PGY-1 Internal Medicine  Pager: [phone] (NS) [phone] (JANEE)  Available on Microsoft Teams  15 Dec 2021 07:20         Jay Horn MD  PGY-1 Internal Medicine  Pager:  [phone] (NS) [phone] ([facility])  Available on [employer] Teams  12-15-21 @ 07:20  _________________________________________________________________________________________________________________________________________    CC:      Patient is a 31y old  Male who presents with a chief complaint of Abdominal pain (15 Dec 2021 01:20)        SUBJECTIVE:    NAEO.    Patient states that he came into the hospital because the feeling of food getting stuck in his throat had been happening for several weeks. Patient denies any f/c, no n/v/d/c. States that he had some SOB and chest pain, but that he feels partially that it is related to his esophageal discomfort and partially due to his anxiety. Patient states that he is going to have his endoscopy done today. GI did do endoscopy this afternoon and biopsied. Started on a diet afterwards. Patient denies any f/c, no n/v/d/c at present, though he has had some vomit in the past. Patient states that usually with his anxiety medications he feels great, but he is worried about his condition and want to know if he'll have his appetite back after the endoscopy. Patient denies any edema, any abdominal pain.  _________________________________________________________________________________________________________________________________________    OBJECTIVE:    Vital Signs Last 24 Hrs  T(C): 36.8 (15 Dec 2021 05:45), Max: 36.9 (14 Dec 2021 17:31)  T(F): 98.3 (15 Dec 2021 05:45), Max: 98.4 (14 Dec 2021 17:31)  HR: 85 (15 Dec 2021 05:45) (74 - 102)  BP: 133/91 (15 Dec 2021 05:45) (124/67 - 148/95)  BP(mean): --  RR: 17 (15 Dec 2021 05:45) (15 - 17)  SpO2: 99% (15 Dec 2021 05:45) (98% - 99%)    I&O's Summary      MEDICATIONS  (STANDING):  enoxaparin Injectable 40 milliGRAM(s) SubCutaneous daily  escitalopram 10 milliGRAM(s) Oral daily  pantoprazole    Tablet 40 milliGRAM(s) Oral before breakfast    MEDICATIONS  (PRN):  acetaminophen     Tablet .. 650 milliGRAM(s) Oral every 6 hours PRN Temp greater or equal to 38C (100.4F), Mild Pain (1 - 3), Moderate Pain (4 - 6)  ondansetron Injectable 4 milliGRAM(s) IV Push every 8 hours PRN Nausea and/or Vomiting        PHYSICAL EXAM:    GENERAL: Standing comfortably, NAD, anxious  EYES: EOMI, PERRL, no scleral icterus  NECK: No JVD  LUNG: Clear to auscultation bilaterally; No wheeze, crackles or rhonci  HEART: Regular rate and rhythm; No murmurs, rubs, or gallops, non-tachy despite anxious demeanour  ABDOMEN: Soft, Nontender, Nondistended, +BS  EXTREMITIES:  No LE edema, 2+ Peripheral Pulses, No clubbing, cyanosis, or edema  PSYCH: AAOx3, anxious, worried about his prognosis and his appetite, no SI appreciated, no AVH  NEUROLOGY: non-focal, strength 5/5 in all extremities, sensation intact  SKIN: No rashes or lesions      Labs:                        16.3   8.54  )-----------( 284      ( 15 Dec 2021 04:04 )             47.7     Auto Eosinophil # 0.20  / Auto Eosinophil % 2.3   / Auto Neutrophil # 5.57  / Auto Neutrophil % 65.3  / BANDS % x                            17.4   10.48 )-----------( 326      ( 14 Dec 2021 15:59 )             50.6     Auto Eosinophil # 0.12  / Auto Eosinophil % 1.1   / Auto Neutrophil # 7.62  / Auto Neutrophil % 72.7  / BANDS % x        Hgb Trend: 16.3<--, 17.4<--    12-15    140  |  102  |  13  ----------------------------<  96  3.7   |  25  |  0.97  12-14    141  |  102  |  14  ----------------------------<  104<H>  3.8   |  23  |  0.94    Ca    9.7      15 Dec 2021 04:04  Mg     2.00     12-15  Phos  3.6     12-15  TPro  6.8  /  Alb  4.7  /  TBili  0.9  /  DBili  x   /  AST  14  /  ALT  16  /  AlkPhos  78  12-15  TPro  7.7  /  Alb  5.1<H>  /  TBili  0.6  /  DBili  x   /  AST  20  /  ALT  22  /  AlkPhos  84  12-14    Creatinine Trend: 0.97<--, 0.94<--    CARDIAC MARKERS ( 15 Dec 2021 04:04 )  x     / x     / 55 U/L / x     / 1.3 ng/mL      Labs result reviewed by me.  12-15-21 @ 18:31      RADIOLOGY & ADDITIONAL TESTS:    < from: CT Abdomen and Pelvis w/ IV Cont (12.14.21 @ 18:57) >  IMPRESSION:    No acute pulmonary disease.    No acute process within the abdomen and pelvis or findings to account for   etiology of symptoms.    < end of copied text >    < from: Upper Endoscopy (12.15.21 @ 12:59) >  Impression:          - Z-line regular, 37 cm from the incisors.                       - Bilious gastric fluid.                       - Antral gastric erythema. Biopsied.          - Normal examined duodenum to the 2nd portion.                       - Biopsies were taken with a cold forceps for histology                        in the proximal esophagus and in the distal esophagus.                       -Esopahgeal stricture, ring or features of EoE not                        detected. Pending clinical course and review of biopsies                        if dyspahgia and chest pain persists may warrant                        assessment for esophagel dysmotility (consideration of                        esopahgeal HRM and endoflip to evaluate for possible                        Jackhammer esophagus or EGJOO).  Recommendation:      - [facility].                       - Resume previous diet.                       - Observe patient's clinical course.                       - Await pathology results.                       -Antireflux precautions and cautious eating habits                        discussed with patient.             - Follow up as outpatient, continue PPI (omeprazole                        40mg/d) for GERD, consider workup for esophageal                        motility disorder with manometry +/- EndoFLIP as                        outpatient.                                                                            < end of copied text >      Ejection Fraction (Teicholtz): 63 %  1. Normal mitral valve. Minimal mitral regurgitation.  2. Normal left ventricular systolic function. No segmental  wall motion abnormalities.  3. Normal right ventricular size and function.  LeftAtrium: Severely dilated left atrium.  LA volume index  = 57 cc/m2.        _________________________________________________________________________________________________________________________________________  Jay Horn MD  PGY-1 Internal Medicine  Pager: [phone] (NS) [phone] (JANEE)  Available on [employer] Teams  15 Dec 2021 07:20

## 2021-12-15 NOTE — H&P ADULT - ASSESSMENT
This is a 32yo male w/ PMH of anxiety, GERD, asthma and esophageal stricture s/p balloon dilation in 2018 presenting w/ worsening dysphagia to solids and episodes of chest tightness w/ palpitations. Admitted for work up of dysphagia and CP/palpitations 2/2 anxiety vs albuterol use vs cardiac etiology.

## 2021-12-15 NOTE — H&P ADULT - NSICDXPASTMEDICALHX_GEN_ALL_CORE_FT
PAST MEDICAL HISTORY:  Anxiety     Asthma     GERD (gastroesophageal reflux disease)     History of esophageal stricture

## 2021-12-15 NOTE — PROGRESS NOTE ADULT - PROBLEM SELECTOR PLAN 2
-episodes of chest tightness w/ associated palpitations and dizziness for 2 weeks  -EKG - nonischemic, sinus tach 103  -fu cardiac enzymes -WNL  -CTA negative for PE   - orthostatics  -TTE  -can consider further card clement out patient if symptoms persist -episodes of chest tightness w/ associated palpitations and dizziness for 2 weeks  -EKG - nonischemic, sinus tach 103  -fu cardiac enzymes -WNL  -CTA negative for PE   - orthostatics  -TTE  -can consider further card clement out patient if symptoms persist    PLAN  - echo shows severely dilated LA    -- cardiology consult in the AM    -- bedboard called to try and move to tele bed  - no acute complaints of chest pain currently

## 2021-12-15 NOTE — H&P ADULT - PROBLEM SELECTOR PLAN 1
-previous EGD w/ balloon dilation for stricture in 2018  -patient states this feels similar   -GI consult in am for EGD  -Protonix 40 daily, zofran PRN   -Clear liquid as tolerated  -aspiration precautions -previous EGD w/ balloon dilation for stricture in 2018  -patient states this feels similar   -GI emailed  -Protonix 40 daily, zofran PRN   -NPO until GI   -aspiration precautions

## 2021-12-15 NOTE — PROGRESS NOTE ADULT - PROBLEM SELECTOR PLAN 5
-educated on albuterol and tachycardia   -cw albuterol PRN appropriately  - monitor O2 -educated on albuterol and tachycardia   -cw albuterol PRN appropriately  - monitor O2    PLAN  - saturating appropriately on room air  - c/w albuterol PRN

## 2021-12-15 NOTE — H&P ADULT - NSICDXFAMILYHX_GEN_ALL_CORE_FT
FAMILY HISTORY:  Father  Still living? Unknown  FH: myocardial infarction, Age at diagnosis: Age Unknown    Mother  Still living? Unknown  FH: mental illness, Age at diagnosis: Age Unknown  FHx: bipolar disorder, Age at diagnosis: Age Unknown    Grandparent  Still living? Unknown  FH: HTN (hypertension), Age at diagnosis: Age Unknown

## 2021-12-15 NOTE — BH CONSULTATION LIAISON ASSESSMENT NOTE - ACCESS TO FIREARM
Patient admitted to rehab with Hemorrhagic shock. A/A/O x 4. Transfers with walker x 1. On genral diet, tolerating well. Medications taken whole in thins. On ASA for DVT prophylaxis. Skin has large hematoma to RLE with wound vac. .  On room air. Has been continent of bowel and bladder. LBM 2/22. Chair/bed alarms in use and call light in reach. L/D/A. Will monitor for safety. No

## 2021-12-15 NOTE — PROGRESS NOTE ADULT - PROBLEM SELECTOR PLAN 1
-previous EGD w/ balloon dilation for stricture in 2018  -patient states this feels similar   -GI emailed  -Protonix 40 daily, zofran PRN   -NPO until GI   -aspiration precautions -previous EGD w/ balloon dilation for stricture in 2018  -patient states this feels similar   -GI emailed  -Protonix 40 daily, zofran PRN   -NPO until GI   -aspiration precautions    PLAN  - endoscopy performed today by GI - returned to floors    -- continue w/ Protonix 40 qdaily    -- will advance diet as tolerated - started on soft and bite size today after scope    -- to follow up path results    -- antireflux precautions    -- manometry +/- with endoflip outpatient

## 2021-12-15 NOTE — CONSULT NOTE ADULT - ATTENDING COMMENTS
Patient reports experiencing approximately 2 weeks of dysphagia, chest pain and heartburn. Stopped his PPI therapy several months ago. Has history of GERD. Past 2 weeks reports dysphagia mostly to solids with sense of retrosternal bolus hangup in association with chest pain. Patient has a history of anxiety disorder which is exacerbating his symptoms. Reports no significant weight loss. Denies fever or any GI bleeding. PE/labs as noted. See full EGD report-of note, esophagus normal without any features of erosive esophagitis, stricture, dilation, retention or any features suggesting eosinophilic esophagitis. No stricture noted and no need for dilation. Esophageal biopsies obtained. Current symptoms possibly GERD related exacerbated by anxiety. For now, recommend pantoprazole 40 mg q.d. with option of b.i.d. therapy if symptoms persist in conjunction with antireflux precautions and dietary modification. Await esophageal biopsies. If continues to have current symptoms as outpatient will plan for esophageal HRM for further evaluation.

## 2021-12-15 NOTE — BH CONSULTATION LIAISON ASSESSMENT NOTE - NSBHPSYCHHX_PSY_A_CORE
yes... I personally performed the services described in the documentation, reviewed the documentation recorded by the scribe in my presence and it accurately and completely records my words and action.

## 2021-12-15 NOTE — H&P ADULT - PROBLEM SELECTOR PLAN 2
-episodes of chest tightness w/ associated palpitations for 2 weeks  -EKG - nonischemic, sinus tach 103  -fu cardiac enzymes   -CTA negative for PE   -TTE -episodes of chest tightness w/ associated palpitations and dizziness for 2 weeks  -EKG - nonischemic, sinus tach 103  -fu cardiac enzymes   -CTA negative for PE   - orthostatics  -TTE -episodes of chest tightness w/ associated palpitations and dizziness for 2 weeks  -EKG - nonischemic, sinus tach 103  -fu cardiac enzymes -WNL  -CTA negative for PE   - orthostatics  -TTE  -can consider further card clement out patient if symptoms persist

## 2021-12-15 NOTE — BH CONSULTATION LIAISON ASSESSMENT NOTE - SUMMARY
This is a 30yo male w/ PMH of anxiety, GERD, asthma and esophageal stricture s/p balloon dilation in 2018. Patient presenting w/ 2 weeks of worsening N/V following eating solid foods, states he feels like "something gets stuck in his chest", pain, decreased PO intake. Patient has been rather anxious, and hence this psychiatry consult.   Patient has a chronic history of anxiety which was stable/baseline till about 2 weeks ago, when there was worsening symptoms in the context of distressing GI symptoms. No SI per se, but has been struggling with coping with symptoms. Vague statements of 'how can I live like this'. He denies any si when asked. No psychosis, is future oriented.   Differentials: anxiety due to pain, RICKIE    Recommendations  - No indication for a 1:1 CO. Would wait for formal psychiatric clearance before discharge. Collateral from wife is pending, and he will need one more follow up to ensure his anxiety is better managed  - Ensure TSH is checked.   - Continue Lexapro 10mg q AM PO  - ANXIETY-- Ativan 1mg q 8hrs PRN PO This is a 30yo male w/ PMH of anxiety, GERD, asthma and esophageal stricture s/p balloon dilation in 2018. Patient presenting w/ 2 weeks of worsening N/V following eating solid foods, states he feels like "something gets stuck in his chest", pain, decreased PO intake. Patient has been rather anxious, and hence this psychiatry consult.   Patient has a chronic history of anxiety which was stable/baseline till about 2 weeks ago, when there was worsening symptoms in the context of distressing GI symptoms. No SI per se, but has been struggling with coping with symptoms. Vague statements of 'how can I live like this'. He denies any si when asked. No psychosis, is future oriented.   Differentials: anxiety due to pain, RICKIE    Recommendations  - No indication for a 1:1 CO. Would wait for formal psychiatric clearance before discharge. Collateral + safe assessment with wife is pending, and he will need one more follow up to ensure his anxiety is better managed.   - Ensure TSH is checked.   - Continue Lexapro 10mg q AM PO  - ANXIETY-- Ativan 1mg q 8hrs PRN PO

## 2021-12-15 NOTE — PROGRESS NOTE ADULT - PROBLEM SELECTOR PLAN 6
DVT proph: lovenox   Diet- NPO until GI DVT proph: lovenox   Diet- soft and bite size, advance diet as tolerated

## 2021-12-15 NOTE — PATIENT PROFILE ADULT - FALL HARM RISK - HARM RISK INTERVENTIONS
Communicate Risk of Fall with Harm to all staff/Monitor for mental status changes/Monitor gait and stability/Reinforce activity limits and safety measures with patient and family/Tailored Fall Risk Interventions/Use of alarms - bed, chair and/or voice tab/Visual Cue: Yellow wristband and red socks/Bed in lowest position, wheels locked, appropriate side rails in place/Call bell, personal items and telephone in reach/Instruct patient to call for assistance before getting out of bed or chair/Non-slip footwear when patient is out of bed/[place] to call system/Physically safe environment - no spills, clutter or unnecessary equipment/Purposeful Proactive Rounding/Room/bathroom lighting operational, light cord in reach

## 2021-12-15 NOTE — BH CONSULTATION LIAISON ASSESSMENT NOTE - NSBHCHARTREVIEWLAB_PSY_A_CORE FT
CBC Full  -  ( 15 Dec 2021 04:04 )  WBC Count : 8.54 K/uL  RBC Count : 5.45 M/uL  Hemoglobin : 16.3 g/dL  Hematocrit : 47.7 %  Platelet Count - Automated : 284 K/uL  Mean Cell Volume : 87.5 fL  Mean Cell Hemoglobin : 29.9 pg  Mean Cell Hemoglobin Concentration : 34.2 gm/dL  Auto Neutrophil # : 5.57 K/uL  Auto Lymphocyte # : 1.94 K/uL  Auto Monocyte # : 0.76 K/uL  Auto Eosinophil # : 0.20 K/uL  Auto Basophil # : 0.05 K/uL  Auto Neutrophil % : 65.3 %  Auto Lymphocyte % : 22.7 %  Auto Monocyte % : 8.9 %  Auto Eosinophil % : 2.3 %  Auto Basophil % : 0.6 %  12-15    140  |  102  |  13  ----------------------------<  96  3.7   |  25  |  0.97    Ca    9.7      15 Dec 2021 04:04  Phos  3.6     12-15  Mg     2.00     12-15    TPro  6.8  /  Alb  4.7  /  TBili  0.9  /  DBili  x   /  AST  14  /  ALT  16  /  AlkPhos  78  12-15

## 2021-12-15 NOTE — BH CONSULTATION LIAISON ASSESSMENT NOTE - HPI (INCLUDE ILLNESS QUALITY, SEVERITY, DURATION, TIMING, CONTEXT, MODIFYING FACTORS, ASSOCIATED SIGNS AND SYMPTOMS)
This is a 30yo male w/ PMH of anxiety, GERD, asthma and esophageal stricture s/p balloon dilation in 2018. Patient presenting w/ 2 weeks of worsening N/V following eating solid foods, states he feels like "something gets stuck in his chest", pain, decreased PO intake. Patient has been rather anxious, and hence this psychiatry consult.     Met with patient. He was anxiously awaiting his endoscopy which is scheduled for today. He states- ' I hope I feel better'. He states that he has a history of anxiety, more in the context of social setting, which did improve with psychotherapy and Lexapro. He states that he used to be on Klonopin in the past which helped but he has not been on it for a while now. He states that he had been feeling fine till about 2 weeks ago. 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 He states since the last 2 weeks he has been having difficulty swallowing, pain while swallowing, worsening pain while lying down, and has not been able to eat well. The pain has been affecting his sleep.   He states that the pain and the difficulty sleeping has been worsening his anxiety. He has been overly worried, catastrophize's  his symptoms, and wonders - ' what if its something bad. What if its all in my head, etc'. He states that he has physical manifestations of his anxiety as well-- palpitation, 'shakes' etc. He adamantly denies any SI, but states- ' if my symptoms don't improve I don't know'. He denies any history of si or sa. He denies any a/vh or paranoia. Has been sad for the last 2 weeks due to the above distressing symptoms but denied experiencing it prior. No lability in mood. Oriented x 3. Admits to intermittent alcohol use (1 beer a few days a week, not more), and intermittent THC use (half a joint a few times a week). Denies any other drug use. Admits to using Klonopin in the last 2 weeks due to anxiety-- an old prescription. States that it did not help. He had sought [facility] crisis evaluation yesterday, and they were concerned about his physical symptoms and recommended he come to ed for GI work up.     Will reach out to wife Reina to gather information This is a 32yo male w/ PMH of anxiety, GERD, asthma and esophageal stricture s/p balloon dilation in 2018. Patient presenting w/ 2 weeks of worsening N/V following eating solid foods, states he feels like "something gets stuck in his chest", pain, decreased PO intake. Patient has been rather anxious, and hence this psychiatry consult.     Met with patient. He was anxiously awaiting his endoscopy which is scheduled for today. He states- ' I hope I feel better'. He states that he has a history of anxiety, more in the context of social setting, which did improve with psychotherapy and Lexapro. He states that he used to be on Klonopin in the past which helped but he has not been on it for a while now. He states that he had been feeling fine till about 2 weeks ago. 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 He states since the last 2 weeks he has been having difficulty swallowing, pain while swallowing, worsening pain while lying down, and has not been able to eat well. The pain has been affecting his sleep.   He states that the pain and the difficulty sleeping has been worsening his anxiety. He has been overly worried, catastrophize's  his symptoms, and wonders - ' what if its something bad. What if its all in my head, etc'. He states that he has physical manifestations of his anxiety as well-- palpitation, 'shakes' etc. He adamantly denies any SI, but states- ' if my symptoms don't improve I don't know'. He denies any history of si or sa. He denies any a/vh or paranoia. Has been sad for the last 2 weeks due to the above distressing symptoms but denied experiencing it prior. No lability in mood. Oriented x 3. Admits to intermittent alcohol use (1 beer a few days a week, not more), and intermittent THC use (half a joint a few times a week). Denies any other drug use. Admits to using Klonopin in the last 2 weeks due to anxiety-- an old prescription. States that it did not help. He had sought [facility] crisis evaluation yesterday, and they were concerned about his physical symptoms and recommended he come to ed for GI work up.     Reached out to wife Reina [phone] to gather information== no answer to phone calls. Unsure if correct number

## 2021-12-15 NOTE — PROGRESS NOTE ADULT - PROBLEM SELECTOR PLAN 4
- patient currently denying SI or plan, no need for 1:1  - stressors at home, possible worsening anxiety   -  home lexapro  - dc self administered Klonipin  - tox screen  - psych consult in am - patient currently denying SI or plan, no need for 1:1  - stressors at home, possible worsening anxiety   - cw home lexapro  - dc self administered Klonipin  - tox screen  - psych consult in am    PLAN  - psych requires more collateral, concerned about anxiety, no active SI, no need for 1:1, but want to have check in the AM prior to approving for safe discharge  - PRN Ativan 1mg q8 PO put in for acute anxiety  - tox screen positive for THC which patient mentioend he takes at home  - c/w home lexapro 10

## 2021-12-15 NOTE — H&P ADULT - PROBLEM SELECTOR PLAN 6
DVT proph: lovenox   Diet- clear liquid can advance as tolerated DVT proph: lovenox   Diet- NPO until GI

## 2021-12-16 ENCOUNTER — TRANSCRIPTION ENCOUNTER (OUTPATIENT)
Age: 31
End: 2021-12-16

## 2021-12-16 VITALS
RESPIRATION RATE: 18 BRPM | HEART RATE: 86 BPM | OXYGEN SATURATION: 100 % | SYSTOLIC BLOOD PRESSURE: 123 MMHG | TEMPERATURE: 98 F | DIASTOLIC BLOOD PRESSURE: 93 MMHG

## 2021-12-16 DIAGNOSIS — R10.9 UNSPECIFIED ABDOMINAL PAIN: ICD-10-CM

## 2021-12-16 LAB
ALBUMIN SERPL ELPH-MCNC: 4.2 G/DL — SIGNIFICANT CHANGE UP (ref 3.3–5)
ALP SERPL-CCNC: 67 U/L — SIGNIFICANT CHANGE UP (ref 40–120)
ALT FLD-CCNC: 15 U/L — SIGNIFICANT CHANGE UP (ref 4–41)
ANION GAP SERPL CALC-SCNC: 13 MMOL/L — SIGNIFICANT CHANGE UP (ref 7–14)
AST SERPL-CCNC: 14 U/L — SIGNIFICANT CHANGE UP (ref 4–40)
BILIRUB SERPL-MCNC: 0.9 MG/DL — SIGNIFICANT CHANGE UP (ref 0.2–1.2)
BUN SERPL-MCNC: 14 MG/DL — SIGNIFICANT CHANGE UP (ref 7–23)
CALCIUM SERPL-MCNC: 9 MG/DL — SIGNIFICANT CHANGE UP (ref 8.4–10.5)
CHLORIDE SERPL-SCNC: 102 MMOL/L — SIGNIFICANT CHANGE UP (ref 98–107)
CO2 SERPL-SCNC: 24 MMOL/L — SIGNIFICANT CHANGE UP (ref 22–31)
CREAT SERPL-MCNC: 0.88 MG/DL — SIGNIFICANT CHANGE UP (ref 0.5–1.3)
GLUCOSE SERPL-MCNC: 81 MG/DL — SIGNIFICANT CHANGE UP (ref 70–99)
HCT VFR BLD CALC: 45.5 % — SIGNIFICANT CHANGE UP (ref 39–50)
HGB BLD-MCNC: 15.3 G/DL — SIGNIFICANT CHANGE UP (ref 13–17)
INR BLD: 1.09 RATIO — SIGNIFICANT CHANGE UP (ref 0.88–1.16)
MAGNESIUM SERPL-MCNC: 2.1 MG/DL — SIGNIFICANT CHANGE UP (ref 1.6–2.6)
MCHC RBC-ENTMCNC: 29.9 PG — SIGNIFICANT CHANGE UP (ref 27–34)
MCHC RBC-ENTMCNC: 33.6 GM/DL — SIGNIFICANT CHANGE UP (ref 32–36)
MCV RBC AUTO: 88.9 FL — SIGNIFICANT CHANGE UP (ref 80–100)
MELD SCORE WITH DIALYSIS: 21 POINTS — SIGNIFICANT CHANGE UP
MELD SCORE WITHOUT DIALYSIS: 7 POINTS — SIGNIFICANT CHANGE UP
NRBC # BLD: 0 /100 WBCS — SIGNIFICANT CHANGE UP
NRBC # FLD: 0 K/UL — SIGNIFICANT CHANGE UP
PHOSPHATE SERPL-MCNC: 3.7 MG/DL — SIGNIFICANT CHANGE UP (ref 2.5–4.5)
PLATELET # BLD AUTO: 240 K/UL — SIGNIFICANT CHANGE UP (ref 150–400)
POTASSIUM SERPL-MCNC: 3.9 MMOL/L — SIGNIFICANT CHANGE UP (ref 3.5–5.3)
POTASSIUM SERPL-SCNC: 3.9 MMOL/L — SIGNIFICANT CHANGE UP (ref 3.5–5.3)
PROT SERPL-MCNC: 6.4 G/DL — SIGNIFICANT CHANGE UP (ref 6–8.3)
PROTHROM AB SERPL-ACNC: 12.5 SEC — SIGNIFICANT CHANGE UP (ref 10.6–13.6)
RBC # BLD: 5.12 M/UL — SIGNIFICANT CHANGE UP (ref 4.2–5.8)
RBC # FLD: 12.2 % — SIGNIFICANT CHANGE UP (ref 10.3–14.5)
SODIUM SERPL-SCNC: 139 MMOL/L — SIGNIFICANT CHANGE UP (ref 135–145)
T4 FREE SERPL-MCNC: 1.6 NG/DL — SIGNIFICANT CHANGE UP (ref 0.9–1.8)
TSH SERPL-MCNC: 1.83 UIU/ML — SIGNIFICANT CHANGE UP (ref 0.27–4.2)
WBC # BLD: 7.37 K/UL — SIGNIFICANT CHANGE UP (ref 3.8–10.5)
WBC # FLD AUTO: 7.37 K/UL — SIGNIFICANT CHANGE UP (ref 3.8–10.5)

## 2021-12-16 PROCEDURE — 99232 SBSQ HOSP IP/OBS MODERATE 35: CPT

## 2021-12-16 PROCEDURE — 99233 SBSQ HOSP IP/OBS HIGH 50: CPT | Mod: GC

## 2021-12-16 PROCEDURE — 99232 SBSQ HOSP IP/OBS MODERATE 35: CPT | Mod: GC

## 2021-12-16 RX ORDER — IPRATROPIUM/ALBUTEROL SULFATE 18-103MCG
3 AEROSOL WITH ADAPTER (GRAM) INHALATION ONCE
Refills: 0 | Status: COMPLETED | OUTPATIENT
Start: 2021-12-16 | End: 2021-12-16

## 2021-12-16 RX ORDER — ALBUTEROL 90 UG/1
2 AEROSOL, METERED ORAL
Qty: 2 | Refills: 0
Start: 2021-12-16 | End: 2022-01-14

## 2021-12-16 RX ORDER — ALBUTEROL 90 UG/1
2 AEROSOL, METERED ORAL EVERY 6 HOURS
Refills: 0 | Status: DISCONTINUED | OUTPATIENT
Start: 2021-12-16 | End: 2021-12-16

## 2021-12-16 RX ORDER — ESCITALOPRAM OXALATE 10 MG/1
1 TABLET, FILM COATED ORAL
Qty: 0 | Refills: 0 | DISCHARGE

## 2021-12-16 RX ORDER — ESCITALOPRAM OXALATE 10 MG/1
1 TABLET, FILM COATED ORAL
Qty: 30 | Refills: 0
Start: 2021-12-16 | End: 2022-01-14

## 2021-12-16 RX ORDER — IPRATROPIUM/ALBUTEROL SULFATE 18-103MCG
1 AEROSOL WITH ADAPTER (GRAM) INHALATION
Refills: 0 | Status: DISCONTINUED | OUTPATIENT
Start: 2021-12-16 | End: 2021-12-16

## 2021-12-16 RX ORDER — ESCITALOPRAM OXALATE 10 MG/1
1 TABLET, FILM COATED ORAL
Qty: 0 | Refills: 0 | DISCHARGE
Start: 2021-12-16

## 2021-12-16 RX ORDER — PANTOPRAZOLE SODIUM 20 MG/1
1 TABLET, DELAYED RELEASE ORAL
Qty: 30 | Refills: 0
Start: 2021-12-16 | End: 2022-01-14

## 2021-12-16 RX ORDER — IPRATROPIUM/ALBUTEROL SULFATE 18-103MCG
3 AEROSOL WITH ADAPTER (GRAM) INHALATION EVERY 6 HOURS
Refills: 0 | Status: DISCONTINUED | OUTPATIENT
Start: 2021-12-16 | End: 2021-12-16

## 2021-12-16 RX ORDER — ALBUTEROL 90 UG/1
2 AEROSOL, METERED ORAL
Qty: 0 | Refills: 0 | DISCHARGE

## 2021-12-16 RX ADMIN — ENOXAPARIN SODIUM 40 MILLIGRAM(S): 100 INJECTION SUBCUTANEOUS at 12:50

## 2021-12-16 RX ADMIN — ESCITALOPRAM OXALATE 10 MILLIGRAM(S): 10 TABLET, FILM COATED ORAL at 12:50

## 2021-12-16 RX ADMIN — PANTOPRAZOLE SODIUM 40 MILLIGRAM(S): 20 TABLET, DELAYED RELEASE ORAL at 06:04

## 2021-12-16 RX ADMIN — Medication 1 MILLIGRAM(S): at 12:50

## 2021-12-16 NOTE — DISCHARGE NOTE NURSING/CASE MANAGEMENT/SOCIAL WORK - PATIENT PORTAL LINK FT
You can access the FollowMyHealth Patient Portal offered by [facility] by registering at the following website: [email or web address]. By joining [facility]’s FollowMyHealth portal, you will also be able to view your health information using other applications (apps) compatible with our system.

## 2021-12-16 NOTE — DISCHARGE NOTE PROVIDER - NSDCCPTREATMENT_GEN_ALL_CORE_FT
PRINCIPAL PROCEDURE  Procedure: Upper endoscopy  Findings and Treatment: The Z-line was regular and was found 37 cm from the incisors. TheGEJ, Z        line, proximal aspect of the gastric folds and the hiatus were located        at 37cm.       Biopsies x 4 were taken with a cold forceps in the proximal esophagus        and in the distal esophagus for histology.       The exam of the esophagus was otherwise normal. There was no detection        strictures or mucosal abnormalities suggestive of EE, Schatzki ring or        features of EoE.       Bilious fluid was found in the gastric body and was suctioned clear.       Localized minimal erythema was found in the gastric antrum. Biopsies        were taken with a cold forceps for Helicobacter pylori testing.       The exam of the stomach was otherwise normal.       The examined duodenum was normal to the 2nd portion.

## 2021-12-16 NOTE — DISCHARGE NOTE NURSING/CASE MANAGEMENT/SOCIAL WORK - NSDCPEFALRISK_GEN_ALL_CORE
For information on Fall & Injury Prevention, visit: [email or web address] OR  [email or web address] OR  https://www.cdc.gov/steadi/patient.html

## 2021-12-16 NOTE — PROGRESS NOTE ADULT - PROBLEM SELECTOR PLAN 1
-previous EGD w/ balloon dilation for stricture in 2018  -patient states this feels similar   -GI emailed  -Protonix 40 daily, zofran PRN   -NPO until GI   -aspiration precautions    PLAN  - endoscopy performed today by GI - returned to floors    -- continue w/ Protonix 40 qdaily    -- will advance diet as tolerated - started on soft and bite size today after scope    -- to follow up path results    -- antireflux precautions    -- manometry +/- with endoflip outpatient -previous EGD w/ balloon dilation for stricture in 2018  -patient states this feels similar   -GI emailed  -Protonix 40 daily, zofran PRN   -NPO until GI   -aspiration precautions    PLAN  - endoscopy performed today by GI - returned to floors    -- continue w/ Protonix 40 qdaily    -- will advance diet as tolerated - started on soft and bite size today after scope - advance to regular diet    -- to follow up path results    -- antireflux precautions    -- manometry +/- with endoflip outpatient

## 2021-12-16 NOTE — PROGRESS NOTE ADULT - SUBJECTIVE AND OBJECTIVE BOX
Interval Events:   No acute events overnight following endoscopy.  Patient denies any acute symptoms at this time.    ROS:   12 point review of systems performed and negative except otherwise noted in  HPI.    Hospital Medications:  acetaminophen     Tablet .. 650 milliGRAM(s) Oral every 6 hours PRN  albuterol/ipratropium for Nebulization 3 milliLiter(s) Nebulizer every 6 hours PRN  albuterol/ipratropium for Nebulization 3 milliLiter(s) Nebulizer once  enoxaparin Injectable 40 milliGRAM(s) SubCutaneous daily  escitalopram 10 milliGRAM(s) Oral daily  LORazepam     Tablet 1 milliGRAM(s) Oral every 8 hours PRN  ondansetron Injectable 4 milliGRAM(s) IV Push every 8 hours PRN  pantoprazole    Tablet 40 milliGRAM(s) Oral before breakfast      PHYSICAL EXAM:   Vital Signs:  Vital Signs Last 24 Hrs  T(C): 36.8 (16 Dec 2021 09:00), Max: 37.1 (15 Dec 2021 18:00)  T(F): 98.2 (16 Dec 2021 09:00), Max: 98.8 (15 Dec 2021 18:00)  HR: 81 (16 Dec 2021 09:00) (69 - 84)  BP: 123/78 (16 Dec 2021 09:00) (109/55 - 140/78)  BP(mean): --  RR: 18 (16 Dec 2021 09:00) (12 - 19)  SpO2: 96% (16 Dec 2021 09:00) (96% - 100%)  Daily Height in cm: 170.18 (15 Dec 2021 13:06)    Daily     GENERAL: no acute distress  NEURO: alert  HEENT: anicteric sclera, no conjunctival pallor appreciated  CHEST: no respiratory distress, no accessory muscle use  CARDIAC: regular rate, +S1/S2  ABDOMEN: soft, nondistended, nontender, no rebound or guarding  EXTREMITIES: warm, well perfused, no edema  SKIN: no lesions noted    LABS: reviewed                        15.3   7.37  )-----------( 240      ( 16 Dec 2021 06:31 )             45.5     12-16    139  |  102  |  14  ----------------------------<  81  3.9   |  24  |  0.88    Ca    9.0      16 Dec 2021 06:31  Phos  3.7     12-16  Mg     2.10     12-16    TPro  6.4  /  Alb  4.2  /  TBili  0.9  /  DBili  x   /  AST  14  /  ALT  15  /  AlkPhos  67  12-16    LIVER FUNCTIONS - ( 16 Dec 2021 06:31 )  Alb: 4.2 g/dL / Pro: 6.4 g/dL / ALK PHOS: 67 U/L / ALT: 15 U/L / AST: 14 U/L / GGT: x             Interval Diagnostic Studies: see sunrise for full report

## 2021-12-16 NOTE — DISCHARGE NOTE PROVIDER - NSFOLLOWUPCLINICS_GEN_ALL_ED_FT
Gastroenterology at [facility]  Gastroenterology  [address] [place] [address]  Phone: [phone]  Fax:     [facility] [place]  Gastroenterology  [address] [place] [address]  Phone: [phone]  Fax:     [facility]  Gastroenterology  [address]  [place] [address]  Phone: [phone]  Fax:     [facility]  Psychiatry  [address] [place] [address]  Phone: [phone]  Fax:   Scheduled Appointment: 12/23/2021

## 2021-12-16 NOTE — DISCHARGE NOTE PROVIDER - NSDCMRMEDTOKEN_GEN_ALL_CORE_FT
albuterol 90 mcg/inh inhalation aerosol: 2 puff(s) inhaled every 6 hours, As Needed  escitalopram 10 mg oral tablet: 1 tab(s) orally once a day  pantoprazole 40 mg oral delayed release tablet: 1 tab(s) orally once a day (before a meal)   albuterol 90 mcg/inh inhalation aerosol: 2 puff(s) inhaled every 6 hours, As Needed  Ativan 1 mg oral tablet: 1 tab(s) orally every 8 hours, As needed, Anxiety MDD:3 mg max per day  escitalopram 10 mg oral tablet: 1 tab(s) orally once a day  pantoprazole 40 mg oral delayed release tablet: 1 tab(s) orally once a day (before a meal)   albuterol 90 mcg/inh inhalation aerosol: 2 puff(s) inhaled every 6 hours, As Needed  escitalopram 10 mg oral tablet: 1 tab(s) orally once a day MDD:10mg  LORazepam 1 mg oral tablet: 1 tab(s) orally every 8 hours, As needed, Anxiety MDD:3mg  pantoprazole 40 mg oral delayed release tablet: 1 tab(s) orally once a day (before a meal)

## 2021-12-16 NOTE — BH CONSULTATION LIAISON PROGRESS NOTE - NSBHCONSULTFOLLOWAFTERCARE_PSY_A_CORE FT
[facility] [phone] (M-F 9am-7pm)     Patient has an appointment with the crisis clinic on 12/23 - shows confirmation on text message  aware he will need to follow up in order to continue medications / further obtain prescriptions   Date discussed w wife - she is aware of f/u plans

## 2021-12-16 NOTE — PROGRESS NOTE ADULT - PROBLEM SELECTOR PLAN 5
-educated on albuterol and tachycardia   -cw albuterol PRN appropriately  - monitor O2    PLAN  - saturating appropriately on room air  - c/w albuterol PRN

## 2021-12-16 NOTE — PROGRESS NOTE ADULT - PROBLEM SELECTOR PLAN 2
-episodes of chest tightness w/ associated palpitations and dizziness for 2 weeks  -EKG - nonischemic, sinus tach 103  -fu cardiac enzymes -WNL  -CTA negative for PE   - orthostatics  -TTE  -can consider further card clement out patient if symptoms persist    PLAN  - echo shows severely dilated LA    -- cardiology consult in the AM    -- bedboard called to try and move to tele bed  - no acute complaints of chest pain currently -episodes of chest tightness w/ associated palpitations and dizziness for 2 weeks  -EKG - nonischemic, sinus tach 103  -fu cardiac enzymes -WNL  -CTA negative for PE   - orthostatics  -TTE  -can consider further card clement out patient if symptoms persist    PLAN    -- cardiology consult in the AM - confirmed with cardiology that he does NOT have a severely enlarged LA, will be corrected on the echo    -- bedboard called to try and move to tele bed - NSR no tele events  - no acute complaints of chest pain currently

## 2021-12-16 NOTE — PROGRESS NOTE ADULT - ATTENDING COMMENTS
Reports feeling significantly better from  GI standpoint. Tolerated regular lunch without any complaints of dysphagia, heartburn, regurgitation, nausea or vomiting. Favorable results of EGD performed 12/15 discussed with patient. Cautious eating habits discussed in conjunction with antireflux precautions. Continue pantoprazole 40 mg q.d. 30 minutes a.c. Await esophageal biopsies. No further inpatient GI workup planned. Out patient GI followup advised.
32yo male w/ PMH of anxiety, GERD, asthma and esophageal stricture s/p balloon dilation in 2018 presenting w/ worsening dysphagia to solids and episodes of chest tightness w/ palpitations. Pt reports dysphagia primarily to solids and some liquids. Plan for EGD today, assess for esophageal strictures +/- dilation and biopsies. Pt also reports some feelings of passive SI 2/2 severe anxiety and pain, denies active plan. Appreciate psych eval, no need for 1:1 currently. Continue with lexapro, ativan prn. Chest tightness unlikely cardiogenic in etiology. TSH wnl. Trops neg, EKG non ischemic, TTE wnl. Rest of care as above
32yo male w/ PMH of anxiety, GERD, asthma and esophageal stricture s/p balloon dilation in 2018 presenting w/ worsening dysphagia to solids and episodes of chest tightness w/ palpitations. s/p EGD 12/15/2021 reveals antral gastric erythema s/p biopsy and esophageal biopsies, (no evidence of esophageal stricture, ring or features of EoE). Can consider outpt workup for esophageal motility disorder. Pt tolerating diet currently.   This AM, pt tachypneic, complaining of chest tightness and reportedly with wheezing on exam. S/p nebs with improvement, lungs clear on my evaluation. Continue with albuterol inhaler prn   Appreciate psych eval, no need for 1:1 currently. Continue with lexapro, ativan prn. Pending psych clearance prior to d/c. Chest tightness unlikely cardiogenic in etiology. TSH wnl. Trops neg, EKG non ischemic, TTE wnl. Left atrium dilation noted was written in error as per repeat read by cardiology. Rest of care as above.

## 2021-12-16 NOTE — DISCHARGE NOTE PROVIDER - NSDCCPCAREPLAN_GEN_ALL_CORE_FT
PRINCIPAL DISCHARGE DIAGNOSIS  Diagnosis: Dysphagia  Assessment and Plan of Treatment: You came into the hospital because you were having trouble swallowing and decreased intake in the setting of the swallowing and abdominal/epigastric pain. You had an endoscopy done, which showed no immediately concerning findings in your esophagus. Biopsy was taken of several sites in your esophagus and stomach to rule out any other etiologies and you were advised to follow up with GI outpatient in order for further potential workup of motility studies. You were started on a higher dose of a PPI for acid. You will be taking Pantoprazole 40mg every day. Additionally, you had increased anxiety in the context of your medical condition, which may have contributed to some of your chest discomfort and shortness of breath. You were seen by our psychiatry team who prescribed 1mg of Ativan as needed for your anxiety, which combined with your Lexapro 10mg, improved your anxiety symptoms. You were determined to be stable and okay to go home. Please continue taking your medications as prescribed. Please follow up with your GI doctor outpatient for any furhter workup of your trouble swallowing. Please maintain your psych follow up appointment on 12/23 for further management of your medications. Please return to the hospital should you have significant nausea, vomiting and unable to eat, fevers >100.4, significant chest pain or shortness of breath.

## 2021-12-16 NOTE — DISCHARGE NOTE PROVIDER - HOSPITAL COURSE
This is a 32yo male w/ PMH of anxiety, GERD, asthma and esophageal stricture s/p balloon dilation in 2018. Patient presenting w/ 2 weeks of worsening N/V following eating solid foods, states he feels like "something gets stuck in his chest". The feeling is worse w. lying down and has lead to a decreased po intake. Patient endorsed 3/10 epigastric pain w/ increased belching. Patient also describes episodes of chest tightness palpitations, SOB, sweating, blurry vision, and dizziness that also started 2 weeks ago that sometimes wake him from sleep. Patient also described using his albuterol inhaler 3-4 times a day to try to help with episodes. Also patient states he tried using marijuana that also didnt help. patient has been taking an old Klonopin prescription 0.25mg for the past 4 days which also did not help. Patient also describes feelings hopeless and wishing he were dead rather than have these episodes. Patient denies current suicidal ideation and states he "feels better now that hes at the hospital and people will help". Of note patient w/ resent job loss 2/2 missing work due to these episodes and that 2 weeks ago the "feds came and took his brother in law away" patient states he is not bothered by this. Patient was unable to elaborate further, writer is unsure if this is a true story.     Patient was admitted for GI workup, made NPO. Patient had endoscopy done on HD1. Endoscopy showed regular Z-line, overall normal esophagus, stomach normal except for some bilious fluid which was suctioned clear, and minimal erythema in the antrum. Patient had biopsies taken from four sites in the esophagus as well as biopsy of the stomach for H pylori testing. [facility]ed to [facility] with resumption of regular diet and awaiting path results w/ outpatient follow up for potential esophageal dysmotility disorder with manometry +/- EndoFLIP as outpatient. Echo was also done inpatient showing normal heart function.    Psychiatry team also evaluated patient due to potential SI and anxiety. Patient had PRN ativan added for his anxiety. No SI was expressed explicitly while inpatient. Wife was called for collateral I  /s/o his recent stressors. Anxiety heavily tied to his medical procedures and diagnoses. Patient was continue on PRN Ativan which improved his symptoms of anxiety and cleared for safe discharge home with wife accepting of plan. Will have outpatient follow up at clinic.    It was at this point patient was determined to be hemodynamically stable and pharmacologically optimized for discharge.

## 2021-12-16 NOTE — PROGRESS NOTE ADULT - ASSESSMENT
31 year old male with history of anxiety, asthma, GERD, and esophageal stricture who presents with worsening dysphagia.    # Esophageal dysphagia  # History of esophageal stricture s/p dilation in 2018  Prior esophageal stricture diagnosed via EGD in [place] in 2018 s/p balloon dilation with no esophageal biopsies taken.  Relatively asymptomatic for the past 3 years, however over the past 1-2 weeks he has noticed progressive worsening esophageal dysphagia, primarily to solids.  He does state he notices "some difficultly now" with liquids, however everything passes ok.  No regurgitation or difficulty managing secretions.  EGD performed 12/15/2021 reveals antral gastric erythema s/p biopsy and esophageal biopsies taken [no evidence of esophageal stricture, ring or features of EoE].    Recommendations:  -advance diet as tolerated  -observe patient's clinical course, no further inpatient GI workup indicated at this time  -f/u pathology results  -continue PPI (omeprazole 40mg/d) for GERD  -consider workup for esophageal motility disorder with manometry +/- EndoFLIP as outpatient  -will sign off at this time, however please call back with questions or should any worsening/persistent issues arise.  Please provide patient with Gastroenterology Clinic to confirm appointment; [phone] ([facility] at [address] [place] [address]) or [phone] ([facility] at [address] [place] [address]) or [phone] ([facility] at [address] [place] [address]).      Marck Payne, PGY-4  GI/Hepatology Fellow    MONDAY-FRIDAY 8AM-5PM:  Pager# [phone] ([facility]) or [phone] ([facility])    NON-URGENT CONSULTS:  Please email [email or web address] OR [email or web address]  AT NIGHT AND ON WEEKENDS:  Contact on-call GI fellow via answering service ([phone]) from 5pm-8am and on weekends/holidays                                                           
This is a 32yo male w/ PMH of anxiety, GERD, asthma and esophageal stricture s/p balloon dilation in 2018 presenting w/ worsening dysphagia to solids and episodes of chest tightness w/ palpitations. Admitted for work up of dysphagia and CP/palpitations 2/2 anxiety vs albuterol use vs cardiac etiology. 
This is a 32yo male w/ PMH of anxiety, GERD, asthma and esophageal stricture s/p balloon dilation in 2018 presenting w/ worsening dysphagia to solids and episodes of chest tightness w/ palpitations. Admitted for work up of dysphagia and CP/palpitations 2/2 anxiety vs albuterol use vs cardiac etiology.

## 2021-12-16 NOTE — BH CONSULTATION LIAISON PROGRESS NOTE - NSBHASSESSMENTFT_PSY_ALL_CORE
This is a 30yo male w/ PMH of anxiety, GERD, asthma and esophageal stricture s/p balloon dilation in 2018. Patient presenting w/ 2 weeks of worsening N/V following eating solid foods, states he feels like "something gets stuck in his chest", pain, decreased PO intake. Patient has been rather anxious, and hence this psychiatry consult.   Patient has a chronic history of anxiety which was stable/baseline till about 2 weeks ago, when there was worsening symptoms in the context of distressing GI symptoms. No SI per se, but has been struggling with coping with symptoms. Vague statements of 'how can I live like this'. He denies any si when asked. No psychosis, is future oriented.   Differentials: anxiety due to pain, RICKIE    12/16: patient with no SI or HI, more hopeful and future oriented.  collateral remains pending - VM left with wife (number confirmed)     Recommendations  - No indication for a 1:1 CO.   - Ensure TSH is checked- > WNL  - Continue Lexapro 10mg q AM PO  - ANXIETY-- Ativan 1mg q 8hrs PRN PO  - VM left with wife again today - awaiting collateral to complete safety assessment.   This is a 32yo male w/ PMH of anxiety, GERD, asthma and esophageal stricture s/p balloon dilation in 2018. Patient presenting w/ 2 weeks of worsening N/V following eating solid foods, states he feels like "something gets stuck in his chest", pain, decreased PO intake. Patient has been rather anxious, and hence this psychiatry consult.   Patient has a chronic history of anxiety which was stable/baseline till about 2 weeks ago, when there was worsening symptoms in the context of distressing GI symptoms. No SI per se, but has been struggling with coping with symptoms. Vague statements of 'how can I live like this'. He denies any si when asked. No psychosis, is future oriented.   Differentials: anxiety due to pain, RICKIE    12/16: patient with no SI or HI, more hopeful and future oriented.  collateral remains pending - VM left with wife (number confirmed)     Recommendations  - No indication for a 1:1 CO.   - Ensure TSH is checked- > WNL  - Continue Lexapro 10mg q AM PO  - ANXIETY-- in hospital: Ativan 1mg q 8hrs PRN PO   -- can DC with ativan script 1mg PRN daily for severe anxiety x 1 week until goes to outpatient appointment      This is a 32yo male w/ PMH of anxiety, GERD, asthma and esophageal stricture s/p balloon dilation in 2018. Patient presenting w/ 2 weeks of worsening N/V following eating solid foods, states he feels like "something gets stuck in his chest", pain, decreased PO intake. Patient has been rather anxious, and hence this psychiatry consult.   Patient has a chronic history of anxiety which was stable/baseline till about 2 weeks ago, when there was worsening symptoms in the context of distressing GI symptoms. No SI per se, but has been struggling with coping with symptoms. Vague statements of 'how can I live like this'. He denies any si when asked. No psychosis, is future oriented.   Differentials: anxiety due to pain, RICKIE    12/16: patient with no SI or HI, more hopeful and future oriented.  collateral obtained. see below     Recommendations  - No indication for a 1:1 CO.   - Ensure TSH is checked- > WNL  - Continue Lexapro 10mg q AM PO  - ANXIETY-- in hospital: Ativan 1mg q 8hrs PRN PO   -- can DC with ativan script 1mg PRN daily for severe anxiety x 1 week until goes to outpatient appointment

## 2021-12-16 NOTE — PROGRESS NOTE ADULT - PROBLEM SELECTOR PLAN 3
-possible dehydration 2/2 por po intake, trial IVF w/ improvement  -increased albuterol use vs worsening anxiety   -TSH  -orthostatics  -monitor HR    PLAN  - f/u on orthostatics  - heart rate has been stable in the 70s and 80s today

## 2021-12-16 NOTE — PROGRESS NOTE ADULT - SUBJECTIVE AND OBJECTIVE BOX
Jay Horn MD  PGY-1 Internal Medicine  Pager:  [phone] (ns) [phone] ([facility])  Available on Microsoft Teams  12-16-21 @ 07:16  _________________________________________________________________________________________________________________________________________    CC:      Patient is a 31y old  Male who presents with a chief complaint of Abdominal pain (15 Dec 2021 10:20)        SUBJECTIVE:    NAEO.      _________________________________________________________________________________________________________________________________________    OBJECTIVE:    Vital Signs Last 24 Hrs  T(C): 36.7 (16 Dec 2021 05:30), Max: 37.2 (15 Dec 2021 09:45)  T(F): 98 (16 Dec 2021 05:30), Max: 98.9 (15 Dec 2021 09:45)  HR: 75 (16 Dec 2021 05:30) (69 - 84)  BP: 126/82 (16 Dec 2021 05:30) (109/55 - 150/92)  BP(mean): --  RR: 18 (16 Dec 2021 05:30) (12 - 19)  SpO2: 100% (16 Dec 2021 05:30) (96% - 100%)    I&O's Summary    15 Dec 2021 07:01  -  16 Dec 2021 07:00  --------------------------------------------------------  IN: 1100 mL / OUT: 0 mL / NET: 1100 mL        MEDICATIONS  (STANDING):  enoxaparin Injectable 40 milliGRAM(s) SubCutaneous daily  escitalopram 10 milliGRAM(s) Oral daily  pantoprazole    Tablet 40 milliGRAM(s) Oral before breakfast    MEDICATIONS  (PRN):  acetaminophen     Tablet .. 650 milliGRAM(s) Oral every 6 hours PRN Temp greater or equal to 38C (100.4F), Mild Pain (1 - 3), Moderate Pain (4 - 6)  LORazepam     Tablet 1 milliGRAM(s) Oral every 8 hours PRN Anxiety  ondansetron Injectable 4 milliGRAM(s) IV Push every 8 hours PRN Nausea and/or Vomiting        PHYSICAL EXAM:    GENERAL: Laying comfortably, NAD  EYES: EOMI, PERRL, no scleral icterus  NECK: No JVD  LUNG: Clear to auscultation bilaterally; No wheeze, crackles or rhonci  HEART: Regular rate and rhythm; No murmurs, rubs, or gallops  ABDOMEN: Soft, Nontender, Nondistended  EXTREMITIES:  No LE edema, 2+ Peripheral Pulses, No clubbing, cyanosis, or edema  PSYCH: AAOx3  NEUROLOGY: non-focal, strength 5/5 in all extremities, sensation intact  SKIN: No rashes or lesions      LABS:                            15.3   7.37  )-----------( 240      ( 16 Dec 2021 06:31 )             45.5     Auto Eosinophil # x     / Auto Eosinophil % x     / Auto Neutrophil # x     / Auto Neutrophil % x     / BANDS % x                            16.3   8.54  )-----------( 284      ( 15 Dec 2021 04:04 )             47.7     Auto Eosinophil # 0.20  / Auto Eosinophil % 2.3   / Auto Neutrophil # 5.57  / Auto Neutrophil % 65.3  / BANDS % x                            17.4   10.48 )-----------( 326      ( 14 Dec 2021 15:59 )             50.6     Auto Eosinophil # 0.12  / Auto Eosinophil % 1.1   / Auto Neutrophil # 7.62  / Auto Neutrophil % 72.7  / BANDS % x        12-15    140  |  102  |  13  ----------------------------<  96  3.7   |  25  |  0.97  12-14    141  |  102  |  14  ----------------------------<  104<H>  3.8   |  23  |  0.94    Ca    9.7      15 Dec 2021 04:04  Mg     2.00     12-15  Phos  3.6     12-15  TPro  6.8  /  Alb  4.7  /  TBili  0.9  /  DBili  x   /  AST  14  /  ALT  16  /  AlkPhos  78  12-15  TPro  7.7  /  Alb  5.1<H>  /  TBili  0.6  /  DBili  x   /  AST  20  /  ALT  22  /  AlkPhos  84  12-14    PT/INR - ( 16 Dec 2021 05:11 )   PT: 12.5 sec;   INR: 1.09 ratio           CARDIAC MARKERS ( 15 Dec 2021 04:04 )  x     / x     / 55 U/L / x     / 1.3 ng/mL          RADIOLOGY & ADDITIONAL TESTS:    Imaging Personally Reviewed:    Consultant(s) Notes Reviewed:      Care Discussed with Consultants/Other Providers:      _________________________________________________________________________________________________________________________________________  Jay Horn MD  PGY-1 Internal Medicine  Pager: [phone] (NS) [phone] ([facility]AN[facility])  Available on Microsoft Teams  16 Dec 2021 07:16         Jay Horn MD  PGY-1 Internal Medicine  Pager:  [phone] (NS) [phone] ([facility])  Available on Microsoft Teams  12-16-21 @ 07:16  _________________________________________________________________________________________________________________________________________    CC:      Patient is a 31y old  Male who presents with a chief complaint of Abdominal pain (15 Dec 2021 10:20)        SUBJECTIVE:    NAEO.    Patient this morning feels alright. States that he was surprised to learn about his heart, but feels like maybe that explains some of his symptoms and discomfort in his chest. Was moved to a tele bed last night. No acute events overnight on tele reading. NSR w/ heart rate in the 70s-80s. Patient states that he feels the Ativan and Lexapro combination helped his anxiety and that he felt almost back to his normal self. Questions about his blood pressure, which has been stable. Patient denies any f/c, no n/v/d/c, no SOB. He does endorse a little discomfort and chest pain that is midsternal, does not radiate anywhere, feels that it is occasional, some SOB at times associated with it. Denies any abdominal pain, no edema, no other pressing symptoms.     Also endorses that he woke up in the middle of the night and his vision was blurry and he felt that things were dragging in his vision, but this resolved. This had never happened to him prior. Likely in the setting of the Ativan sedation, will monitor.  _________________________________________________________________________________________________________________________________________    OBJECTIVE:    Vital Signs Last 24 Hrs  T(C): 36.7 (16 Dec 2021 05:30), Max: 37.2 (15 Dec 2021 09:45)  T(F): 98 (16 Dec 2021 05:30), Max: 98.9 (15 Dec 2021 09:45)  HR: 75 (16 Dec 2021 05:30) (69 - 84)  BP: 126/82 (16 Dec 2021 05:30) (109/55 - 150/92)  BP(mean): --  RR: 18 (16 Dec 2021 05:30) (12 - 19)  SpO2: 100% (16 Dec 2021 05:30) (96% - 100%)    I&O's Summary    15 Dec 2021 07:01  -  16 Dec 2021 07:00  --------------------------------------------------------  IN: 1100 mL / OUT: 0 mL / NET: 1100 mL        MEDICATIONS  (STANDING):  enoxaparin Injectable 40 milliGRAM(s) SubCutaneous daily  escitalopram 10 milliGRAM(s) Oral daily  pantoprazole    Tablet 40 milliGRAM(s) Oral before breakfast    MEDICATIONS  (PRN):  acetaminophen     Tablet .. 650 milliGRAM(s) Oral every 6 hours PRN Temp greater or equal to 38C (100.4F), Mild Pain (1 - 3), Moderate Pain (4 - 6)  LORazepam     Tablet 1 milliGRAM(s) Oral every 8 hours PRN Anxiety  ondansetron Injectable 4 milliGRAM(s) IV Push every 8 hours PRN Nausea and/or Vomiting    PHYSICAL EXAM:    GENERAL: Laying comfortably, NAD  EYES: EOMI, PERRL, no scleral icterus  NECK: No JVD  LUNG: Clear to auscultation bilaterally; No wheeze, crackles or rhonci  HEART: Regular rate and rhythm; No murmurs, rubs, or gallops  ABDOMEN: Soft, Nontender, Nondistended, +BS  EXTREMITIES:  No LE edema, 2+ Peripheral Pulses, No clubbing, cyanosis, or edema  PSYCH: AAOx3, less anxious than prior day  NEUROLOGY: non-focal, strength 5/5 in all extremities, sensation intact  SKIN: No rashes or lesions      Labs:                        15.3   7.37  )-----------( 240      ( 16 Dec 2021 06:31 )             45.5     Auto Eosinophil # x     / Auto Eosinophil % x     / Auto Neutrophil # x     / Auto Neutrophil % x     / BANDS % x                            16.3   8.54  )-----------( 284      ( 15 Dec 2021 04:04 )             47.7     Auto Eosinophil # 0.20  / Auto Eosinophil % 2.3   / Auto Neutrophil # 5.57  / Auto Neutrophil % 65.3  / BANDS % x                            17.4   10.48 )-----------( 326      ( 14 Dec 2021 15:59 )             50.6     Auto Eosinophil # 0.12  / Auto Eosinophil % 1.1   / Auto Neutrophil # 7.62  / Auto Neutrophil % 72.7  / BANDS % x        Hgb Trend: 15.3<--, 16.3<--, 17.4<--    12-16    139  |  102  |  14  ----------------------------<  81  3.9   |  24  |  0.88  12-15    140  |  102  |  13  ----------------------------<  96  3.7   |  25  |  0.97  12-14    141  |  102  |  14  ----------------------------<  104<H>  3.8   |  23  |  0.94    Ca    9.0      16 Dec 2021 06:31  Mg     2.10     12-16  Phos  3.7     12-16  TPro  6.4  /  Alb  4.2  /  TBili  0.9  /  DBili  x   /  AST  14  /  ALT  15  /  AlkPhos  67  12-16  TPro  6.8  /  Alb  4.7  /  TBili  0.9  /  DBili  x   /  AST  14  /  ALT  16  /  AlkPhos  78  12-15  TPro  7.7  /  Alb  5.1<H>  /  TBili  0.6  /  DBili  x   /  AST  20  /  ALT  22  /  AlkPhos  84  12-14    Creatinine Trend: 0.88<--, 0.97<--, 0.94<--  PT/INR - ( 16 Dec 2021 06:31 )   PT: 12.5 sec;   INR: 1.09 ratio           CARDIAC MARKERS ( 15 Dec 2021 04:04 )  x     / x     / 55 U/L / x     / 1.3 ng/mL      Labs result reviewed by me.  12-16-21 @ 09:36      RADIOLOGY & ADDITIONAL TESTS:    < from: CT Abdomen and Pelvis w/ IV Cont (12.14.21 @ 18:57) >  IMPRESSION:    No acute pulmonary disease.    No acute process within the abdomen and pelvis or findings to account for   etiology of symptoms.    < end of copied text >    < from: Upper Endoscopy (12.15.21 @ 12:59) >  Impression:          - Z-line regular, 37 cm from the incisors.                       - Bilious gastric fluid.                       - Antral gastric erythema. Biopsied.          - Normal examined duodenum to the 2nd portion.                       - Biopsies were taken with a cold forceps for histology                        in the proximal esophagus and in the distal esophagus.                       -Esopahgeal stricture, ring or features of EoE not                        detected. Pending clinical course and review of biopsies                        if dyspahgia and chest pain persists may warrant                        assessment for esophagel dysmotility (consideration of                        esopahgeal HRM and endoflip to evaluate for possible                        Jackhammer esophagus or EGJOO).  Recommendation:      - [facility] for [facility].                       - Resume previous diet.                       - Observe patient's clinical course.                       - Await pathology results.                       -Antireflux precautions and cautious eating habits                        discussed with patient.             - Follow up as outpatient, continue PPI (omeprazole                        40mg/d) for GERD, consider workup for esophageal                        motility disorder with manometry +/- EndoFLIP as                        outpatient.    < end of copied text >    < from: Transthoracic Echocardiogram (12.15.21 @ 11:01) >  1. Normal mitral valve. Minimal mitral regurgitation.  2. Normal left ventricular systolic function. No segmental  wall motion abnormalities.  3. Normal right ventricular size and function.    < end of copied text >        _________________________________________________________________________________________________________________________________________  Jay Horn MD  PGY-1 Internal Medicine  Pager: [phone] (ns) [phone] ([facility])  Available on [employer] Teams  16 Dec 2021 07:16

## 2021-12-16 NOTE — BH CONSULTATION LIAISON PROGRESS NOTE - NSBHFUPINTERVALHXFT_PSY_A_CORE
Patient was seen and assessed at bedside. Patient is alert, oriented, calm, cooperative, bright and engaged in interview.  Patient states he appreciates the work the team has done here and feels he finally has some answers to his medical questions. These answers have provided him with more hope for the future. 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  Patient reports no SI or HI.  He feels the lexapro and ativan were very helpful to him. Wants to continue at home as well as finding a new psychiatrist /therapist. Patient is without psychosis. no jose.

## 2021-12-16 NOTE — BH CONSULTATION LIAISON PROGRESS NOTE - NSBHCONSULTRECOMMENDOTHER_PSY_A_CORE FT
Spoke with wife Fidel - as per fidel patient has been stressed at home, more anxious. never was she concerned for patient safety - he never mentioned feeling suicidal or expressed thoughts to end his life.  States patient anxiety is closely tied to his medical problems as well. She feels safe taking patient home.  Expresses happiness that patient doing well on his medications (lexparo and ativan)  discussed importance of outpatient follow up. discussed medications and risk of addiction with ativan.  She expresses no concern with hx of addiction and feels patient can be safe with this medication at home.

## 2021-12-16 NOTE — PROGRESS NOTE ADULT - SUBJECTIVE AND OBJECTIVE BOX
ANESTHESIA POSTOP CHECK    31y Male POSTOP DAY 1 s/p EGD under general anesthesia. Patient had nausea, which has now resolved after treatment with zofran.    No COMPLAINTS    NO APPARENT ANESTHESIA COMPLICATIONS

## 2021-12-16 NOTE — DISCHARGE NOTE PROVIDER - NSFOLLOWUPCLINICSTOKEN_GEN_ALL_ED_FT
[id number]: || ||00\01||False;[id number]: || ||00\01||False;[id number]: || ||00\01||False;[id number]: ||12/23/2021||00\01||False;

## 2021-12-16 NOTE — BH CONSULTATION LIAISON PROGRESS NOTE - NSBHCHARTREVIEWLAB_PSY_A_CORE FT
15.3   7.37  )-----------( 240      ( 16 Dec 2021 06:31 )             45.5   12-16    139  |  102  |  14  ----------------------------<  81  3.9   |  24  |  0.88    Ca    9.0      16 Dec 2021 06:31  Phos  3.7     12-16  Mg     2.10     12-16    TPro  6.4  /  Alb  4.2  /  TBili  0.9  /  DBili  x   /  AST  14  /  ALT  15  /  AlkPhos  67  12-16

## 2021-12-16 NOTE — BH CONSULTATION LIAISON PROGRESS NOTE - CASE SUMMARY
I agree with MANUELA Chan's history, MSE, A/P. I personally saw and examined the patient this afternoon. Patient states that he is feeling better and less anxious. Adamantly denies SI. Shows [facility]. Affect is euthymic. Thought process is linear. Showing future oriented behavior.  Plan per above.

## 2021-12-16 NOTE — BH CONSULTATION LIAISON PROGRESS NOTE - NSBHCHARTREVIEWVS_PSY_A_CORE FT
Vital Signs Last 24 Hrs  T(C): 36.8 (16 Dec 2021 09:00), Max: 37.1 (15 Dec 2021 18:00)  T(F): 98.2 (16 Dec 2021 09:00), Max: 98.8 (15 Dec 2021 18:00)  HR: 81 (16 Dec 2021 09:00) (69 - 84)  BP: 123/78 (16 Dec 2021 09:00) (109/55 - 140/78)  BP(mean): --  RR: 18 (16 Dec 2021 09:00) (12 - 19)  SpO2: 96% (16 Dec 2021 09:00) (96% - 100%)

## 2021-12-16 NOTE — BH CONSULTATION LIAISON PROGRESS NOTE - CURRENT MEDICATION
MEDICATIONS  (STANDING):  albuterol/ipratropium for Nebulization 3 milliLiter(s) Nebulizer once  enoxaparin Injectable 40 milliGRAM(s) SubCutaneous daily  escitalopram 10 milliGRAM(s) Oral daily  pantoprazole    Tablet 40 milliGRAM(s) Oral before breakfast    MEDICATIONS  (PRN):  acetaminophen     Tablet .. 650 milliGRAM(s) Oral every 6 hours PRN Temp greater or equal to 38C (100.4F), Mild Pain (1 - 3), Moderate Pain (4 - 6)  albuterol/ipratropium for Nebulization 3 milliLiter(s) Nebulizer every 6 hours PRN Shortness of Breath and/or Wheezing  LORazepam     Tablet 1 milliGRAM(s) Oral every 8 hours PRN Anxiety  ondansetron Injectable 4 milliGRAM(s) IV Push every 8 hours PRN Nausea and/or Vomiting

## 2021-12-16 NOTE — DISCHARGE NOTE PROVIDER - NSDCFUADDAPPT_GEN_ALL_CORE_FT
Please call the gastroenterology clinic to confirm an appointment. Several clinic options have been provided, either will be fine.    Please follow up with psychiatry appointment on 12/23. The phone number is [phone]. Please call the gastroenterology clinic to confirm an appointment. Several clinic options have been provided, either will be fine.    Please follow up with psychiatry appointment on 12/23. The phone number is [phone].  [facility] [phone] (M-F 9am-7pm)

## 2021-12-16 NOTE — DISCHARGE NOTE PROVIDER - CARE PROVIDER_API CALL
[facility]  [address] [address]  Phone: [phone]  Fax: [phone]  Established Patient  Follow Up Time:

## 2021-12-16 NOTE — DISCHARGE NOTE NURSING/CASE MANAGEMENT/SOCIAL WORK - NSDCFUADDAPPT_GEN_ALL_CORE_FT
Please call the gastroenterology clinic to confirm an appointment. Several clinic options have been provided, either will be fine.    Please follow up with psychiatry appointment on 12/23. The phone number is [phone].  [facility] [phone] (M-F 9am-7pm)

## 2021-12-16 NOTE — PROGRESS NOTE ADULT - PROBLEM SELECTOR PLAN 4
- patient currently denying SI or plan, no need for 1:1  - stressors at home, possible worsening anxiety   - cw home lexapro  - dc self administered Klonipin  - tox screen  - psych consult in am    PLAN  - psych requires more collateral, concerned about anxiety, no active SI, no need for 1:1, but want to have check in the AM prior to approving for safe discharge  - PRN Ativan 1mg q8 PO put in for acute anxiety  - tox screen positive for THC which patient mentioend he takes at home  - c/w home lexapro 10

## 2021-12-20 PROBLEM — Z00.00 ENCOUNTER FOR PREVENTIVE HEALTH EXAMINATION: Status: ACTIVE | Noted: 2021-12-20

## 2021-12-20 LAB — SURGICAL PATHOLOGY STUDY: SIGNIFICANT CHANGE UP

## 2021-12-23 PROCEDURE — 99214 OFFICE O/P EST MOD 30 MIN: CPT | Mod: 95

## 2021-12-28 ENCOUNTER — NON-APPOINTMENT (OUTPATIENT)
Age: 31
End: 2021-12-28

## 2022-07-21 ENCOUNTER — INPATIENT (INPATIENT)
Facility: HOSPITAL | Age: 32
LOS: 7 days | Discharge: HOME | End: 2022-07-29
Attending: PSYCHIATRY & NEUROLOGY | Admitting: PSYCHIATRY & NEUROLOGY

## 2022-07-21 ENCOUNTER — OUTPATIENT (OUTPATIENT)
Dept: OUTPATIENT SERVICES | Facility: HOSPITAL | Age: 32
LOS: 1 days | Discharge: STILL ADMITTED OR ADMINISTRATIVE | End: 2022-07-21

## 2022-07-21 VITALS
DIASTOLIC BLOOD PRESSURE: 87 MMHG | SYSTOLIC BLOOD PRESSURE: 114 MMHG | HEART RATE: 139 BPM | TEMPERATURE: 99 F | HEIGHT: 67 IN | WEIGHT: 223.99 LBS

## 2022-07-21 DIAGNOSIS — Z98.890 OTHER SPECIFIED POSTPROCEDURAL STATES: Chronic | ICD-10-CM

## 2022-07-21 DIAGNOSIS — Z87.2 PERSONAL HISTORY OF DISEASES OF THE SKIN AND SUBCUTANEOUS TISSUE: Chronic | ICD-10-CM

## 2022-07-21 DIAGNOSIS — F06.4 ANXIETY DISORDER DUE TO KNOWN PHYSIOLOGICAL CONDITION: ICD-10-CM

## 2022-07-21 DIAGNOSIS — F32.A DEPRESSION, UNSPECIFIED: ICD-10-CM

## 2022-07-21 DIAGNOSIS — F33.9 MAJOR DEPRESSIVE DISORDER, RECURRENT, UNSPECIFIED: ICD-10-CM

## 2022-07-21 PROBLEM — J45.909 UNSPECIFIED ASTHMA, UNCOMPLICATED: Chronic | Status: ACTIVE | Noted: 2021-12-15

## 2022-07-21 PROBLEM — F41.9 ANXIETY DISORDER, UNSPECIFIED: Chronic | Status: ACTIVE | Noted: 2021-12-15

## 2022-07-21 PROBLEM — K21.9 GASTRO-ESOPHAGEAL REFLUX DISEASE WITHOUT ESOPHAGITIS: Chronic | Status: ACTIVE | Noted: 2021-12-15

## 2022-07-21 PROBLEM — Z87.19 PERSONAL HISTORY OF OTHER DISEASES OF THE DIGESTIVE SYSTEM: Chronic | Status: ACTIVE | Noted: 2021-12-15

## 2022-07-21 LAB
FLUAV AG NPH QL: SIGNIFICANT CHANGE UP
FLUBV AG NPH QL: SIGNIFICANT CHANGE UP
RSV RNA NPH QL NAA+NON-PROBE: SIGNIFICANT CHANGE UP
SARS-COV-2 RNA SPEC QL NAA+PROBE: SIGNIFICANT CHANGE UP

## 2022-07-21 PROCEDURE — 90839 PSYTX CRISIS INITIAL 60 MIN: CPT | Mod: 95

## 2022-07-21 PROCEDURE — 99222 1ST HOSP IP/OBS MODERATE 55: CPT

## 2022-07-21 RX ORDER — ALBUTEROL 90 UG/1
2 AEROSOL, METERED ORAL EVERY 6 HOURS
Refills: 0 | Status: DISCONTINUED | OUTPATIENT
Start: 2022-07-21 | End: 2022-07-29

## 2022-07-21 RX ORDER — HYDROXYZINE HCL 10 MG
25 TABLET ORAL EVERY 6 HOURS
Refills: 0 | Status: DISCONTINUED | OUTPATIENT
Start: 2022-07-21 | End: 2022-07-29

## 2022-07-21 RX ORDER — HALOPERIDOL DECANOATE 100 MG/ML
5 INJECTION INTRAMUSCULAR EVERY 6 HOURS
Refills: 0 | Status: DISCONTINUED | OUTPATIENT
Start: 2022-07-21 | End: 2022-07-29

## 2022-07-21 RX ORDER — DIPHENHYDRAMINE HCL 50 MG
50 CAPSULE ORAL EVERY 6 HOURS
Refills: 0 | Status: DISCONTINUED | OUTPATIENT
Start: 2022-07-21 | End: 2022-07-29

## 2022-07-21 RX ORDER — DIPHENHYDRAMINE HCL 50 MG
50 CAPSULE ORAL ONCE
Refills: 0 | Status: DISCONTINUED | OUTPATIENT
Start: 2022-07-21 | End: 2022-07-29

## 2022-07-21 RX ORDER — PANTOPRAZOLE SODIUM 20 MG/1
40 TABLET, DELAYED RELEASE ORAL
Refills: 0 | Status: DISCONTINUED | OUTPATIENT
Start: 2022-07-21 | End: 2022-07-29

## 2022-07-21 RX ORDER — HALOPERIDOL DECANOATE 100 MG/ML
5 INJECTION INTRAMUSCULAR ONCE
Refills: 0 | Status: DISCONTINUED | OUTPATIENT
Start: 2022-07-21 | End: 2022-07-29

## 2022-07-21 RX ORDER — ESCITALOPRAM OXALATE 10 MG/1
15 TABLET, FILM COATED ORAL DAILY
Refills: 0 | Status: DISCONTINUED | OUTPATIENT
Start: 2022-07-21 | End: 2022-07-22

## 2022-07-21 NOTE — CHART NOTE - NSCHARTNOTEFT_GEN_A_CORE
Screening Medical Evaluation    Patient Admitted from: Crisis center.     [facility] admitting diagnosis: Recurrent major depressive disorder.       PAST MEDICAL & SURGICAL HISTORY:  Anxiety      GERD (gastroesophageal reflux disease)      Asthma      History of esophageal stricture      S/P excision of lipoma      S/P balloon dilatation of esophageal stricture      H/O ingrown nail            Allergies    Augmentin (Vomiting)    Intolerances          Social History:       FAMILY HISTORY:  FHx: bipolar disorder (Mother)    FH: myocardial infarction (Father)    FH: mental illness (Mother)  DID    FH: HTN (hypertension) (Grandparent)          MEDICATIONS  (STANDING):  escitalopram 15 milliGRAM(s) Oral daily  LORazepam     Tablet 1 milliGRAM(s) Oral daily  pantoprazole    Tablet 40 milliGRAM(s) Oral before breakfast    MEDICATIONS  (PRN):  ALBUTerol    90 MICROgram(s) HFA Inhaler 2 Puff(s) Inhalation every 6 hours PRN asthma  diphenhydrAMINE 50 milliGRAM(s) Oral every 6 hours PRN agitation  diphenhydrAMINE Injectable 50 milliGRAM(s) IntraMuscular once PRN agitation  haloperidol     Tablet 5 milliGRAM(s) Oral every 6 hours PRN agitation  haloperidol    Injectable 5 milliGRAM(s) IntraMuscular once PRN agitation  hydrOXYzine hydrochloride 25 milliGRAM(s) Oral every 6 hours PRN anxiety  LORazepam     Tablet 2 milliGRAM(s) Oral every 6 hours PRN agitation        Vital Signs Last 24 Hrs  T(C): 37.2 (21 Jul 2022 14:23), Max: 37.2 (21 Jul 2022 14:23)  T(F): 99 (21 Jul 2022 14:23), Max: 99 (21 Jul 2022 14:23)  HR: 128 (21 Jul 2022 14:25) (128 - 139)  BP: 124/80 (21 Jul 2022 14:25) (114/87 - 124/80)  BP(mean): --  RR: --  SpO2: --      CAPILLARY BLOOD GLUCOSE            PHYSICAL EXAM:  GENERAL: NAD, over weight.  HEAD:  Atraumatic, Normocephalic  EYES: EOMI, conjunctiva and sclera clear  NECK: Supple, No JVD  CHEST/LUNG: Clear to auscultation bilaterally; No wheeze  HEART: Regular rate and rhythm; No murmurs, rubs, or gallops  ABDOMEN: Soft, Nontender, Nondistended; Bowel sounds present  EXTREMITIES:  2+ Peripheral Pulses, No clubbing, cyanosis, or edema  PSYCH: AAOx3, calm and cooperative.   NEUROLOGY: non-focal  SKIN: Limited: No rashes or lesions seen on exposed skin.     LABS:                    RADIOLOGY & ADDITIONAL TESTS:      Assessment and Plan:  31M admitted to [facility] for recurrent major depressive disorder. PMHx of asthma, GERD, esophageal stricture S/P balloon dilatation of esophageal stricture. Pt seen at bedside for medical screening evaluation. Patient has no acute complaints at this time. Patient denies fever, chills, headache, dizziness, lightheadedness, N/V, SOB, cough, congestion, chest pain, abdominal pain, dysuria, hematuria, diarrhea, constipation. Physical exam unremarkable, VSS. Labs COVID and RVP Not detected.  EKG Pending.     1.) Asthma: Currently stable. Pro Air inhaler PRN.   2.) GERD: Continue PPI.   3.) Recurrent major depressive disorder: F/U EKG to assess QT/ QTC. Plan per primary team.   3.) Screening Medical Evaluation    Patient Admitted from: Crisis center.     [facility] admitting diagnosis: Recurrent major depressive disorder.       PAST MEDICAL & SURGICAL HISTORY:  Anxiety      GERD (gastroesophageal reflux disease)      Asthma      History of esophageal stricture      S/P excision of lipoma      S/P balloon dilatation of esophageal stricture      H/O ingrown nail            Allergies    Augmentin (Vomiting)    Intolerances          Social History:       FAMILY HISTORY:  FHx: bipolar disorder (Mother)    FH: myocardial infarction (Father)    FH: mental illness (Mother)  DID    FH: HTN (hypertension) (Grandparent)          MEDICATIONS  (STANDING):  escitalopram 15 milliGRAM(s) Oral daily  LORazepam     Tablet 1 milliGRAM(s) Oral daily  pantoprazole    Tablet 40 milliGRAM(s) Oral before breakfast    MEDICATIONS  (PRN):  ALBUTerol    90 MICROgram(s) HFA Inhaler 2 Puff(s) Inhalation every 6 hours PRN asthma  diphenhydrAMINE 50 milliGRAM(s) Oral every 6 hours PRN agitation  diphenhydrAMINE Injectable 50 milliGRAM(s) IntraMuscular once PRN agitation  haloperidol     Tablet 5 milliGRAM(s) Oral every 6 hours PRN agitation  haloperidol    Injectable 5 milliGRAM(s) IntraMuscular once PRN agitation  hydrOXYzine hydrochloride 25 milliGRAM(s) Oral every 6 hours PRN anxiety  LORazepam     Tablet 2 milliGRAM(s) Oral every 6 hours PRN agitation        Vital Signs Last 24 Hrs  T(C): 37.2 (21 Jul 2022 14:23), Max: 37.2 (21 Jul 2022 14:23)  T(F): 99 (21 Jul 2022 14:23), Max: 99 (21 Jul 2022 14:23)  HR: 128 (21 Jul 2022 14:25) (128 - 139)  BP: 124/80 (21 Jul 2022 14:25) (114/87 - 124/80)  BP(mean): --  RR: --  SpO2: --      CAPILLARY BLOOD GLUCOSE            PHYSICAL EXAM:  GENERAL: NAD, over weight.  HEAD:  Atraumatic, Normocephalic  EYES: EOMI, conjunctiva and sclera clear  NECK: Supple, No JVD  CHEST/LUNG: Clear to auscultation bilaterally; No wheeze  HEART: Regular rate and rhythm; No murmurs, rubs, or gallops  ABDOMEN: Soft, Nontender, Nondistended; Bowel sounds present  EXTREMITIES:  2+ Peripheral Pulses, No clubbing, cyanosis, or edema  PSYCH: AAOx3, calm and cooperative.   NEUROLOGY: non-focal  SKIN: Limited: No rashes or lesions seen on exposed skin.     LABS:                    RADIOLOGY & ADDITIONAL TESTS:      Assessment and Plan:  31M admitted to [facility] for recurrent major depressive disorder. PMHx of asthma, GERD, esophageal stricture S/P balloon dilatation of esophageal stricture. Pt seen at bedside for medical screening evaluation. Patient has no acute complaints at this time. Patient denies fever, chills, headache, dizziness, lightheadedness, N/V, SOB, cough, congestion, chest pain, abdominal pain, dysuria, hematuria, diarrhea, constipation. Physical exam unremarkable, VSS. Labs COVID and RVP Not detected.  EKG Pending.     1.) Asthma: Currently stable. Pro Air inhaler PRN.   2.) GERD: Continue PPI.   3.) Recurrent major depressive disorder: F/U EKG to assess QT/ QTC. Plan per primary team.

## 2022-07-21 NOTE — BH INPATIENT PSYCHIATRY ASSESSMENT NOTE - RISK ASSESSMENT
- Modifiable risk factors: Depression, anxiety, and SI  - Unmodifiable risk factors: male gender, age, genetic predisposition  - Protective factors: treatment seeking, supportive network, no prior SA/NSSIB, no prior inpatient admission  - Given above, the patient is clinically appropriate voluntary admission. Modifiable risk factors are being addressed as below.

## 2022-07-21 NOTE — BH PATIENT PROFILE - HOME MEDICATIONS
LORazepam 1 mg oral tablet , 1 tab(s) orally every 8 hours, As needed, Anxiety MDD:3mg  pantoprazole 40 mg oral delayed release tablet , 1 tab(s) orally once a day (before a meal)  albuterol 90 mcg/inh inhalation aerosol , 2 puff(s) inhaled every 6 hours, As Needed  escitalopram 10 mg oral tablet , 1 tab(s) orally once a day MDD:10mg

## 2022-07-21 NOTE — BH INPATIENT PSYCHIATRY ASSESSMENT NOTE - NSBHCONSBHPROVDETAILS_PSY_A_CORE  FT
[facility]  INFECTIOUS DISEASES AND INTERNAL MEDICINE at [place]  =======================================================  Rafa Garay MD  Diplomates American Board of Internal Medicine and Infectious Diseases  Tel: [phone]      Fax: [phone]  =======================================================      [id number]  ABNER MORA    CC: Patient is a 27y old  Male who presents with a chief complaint of bacteremia, stent migration (30 May 2021 20:15)      27y  Male with h/o IVDA, esophageal stricture s/p stent placement.  Patient had recent admission after using heroin and presented with septic shock and found to have multi focal pna and subsequently intubated and was in MICU, completed course of IV Zosyn. Patient worked up for severe esophageal stricture and is now s/p esophageal  stent paced by  GI endoscopy on 5/25. Patient eventually discharged home. He presented to ER 5/29 after snorting multiple percocet and having multiple episodes of vomiting. He eventually left AMA. He was called back to the ER due to positive blood cultures. In the ER patient was noted to be febrile, tachycardic, CT chest abdomen pelvis reporting esophageal stent has migrated to small bowel. GI called. Patient complaining of fever and nausea. He states he's been eating okay and having bowel movements. Denies abdominal pain. Patient admits to injecting heroin multiple times the last couple of days. He claims he uses clean needles. Started on IV Vancomycin and Zosyn. ID input requested.       Past Medical & Surgical Hx:  Heroin abuse  Cocaine abuse  GERD (gastroesophageal reflux disease)  Hiatal hernia  History of esophageal stricture  Migration of pancreatic stent  Migrated esophageal stent      Social Hx:  + Cigarette smoker   + IV Heroin use     FAMILY HISTORY:  FH: thyroid disease (Grandparent)      Allergies  No Known Allergies      REVIEW OF SYSTEMS:  CONSTITUTIONAL:  + Fever + chills  HEENT:  No diplopia or blurred vision.  No earache, sore throat or runny nose.  CARDIOVASCULAR:  No pressure, squeezing, strangling, tightness, heaviness or aching about the chest, neck, axilla or epigastrium.  RESPIRATORY:  No cough, shortness of breath  GASTROINTESTINAL:  No nausea, vomiting or diarrhea.  GENITOURINARY:  No dysuria, frequency or urgency.   MUSCULOSKELETAL:  no joint aches, no muscle pain  SKIN:  No change in skin, hair or nails.  NEUROLOGIC:  No Headaches, seizures  PSYCHIATRIC:  No disorder of thought or mood.  ENDOCRINE:  No heat or cold intolerance  HEMATOLOGICAL:  No easy bruising or bleeding.       Physical Exam:  GEN: NAD, pleasant  HEENT: normocephalic and atraumatic. EOMI. PERRL.  Anicteric  NECK: Supple.   LUNGS: Clear to auscultation.  HEART: Regular rate and rhythm   ABDOMEN: Soft, nontender, and nondistended.  Positive bowel sounds.    : No CVA tenderness  EXTREMITIES: Without any edema.  MSK: No joint swelling  NEUROLOGIC: No Focal Deficits  PSYCHIATRIC: Appropriate affect .  SKIN: multiple needle marks, no cellulitis or abscess noted       Height (cm): 180.3 (05-30 @ 15:17)  Weight (kg): 72.6 (05-30 @ 15:17)  BMI (kg/m2): 22.3 (05-30 @ 15:17)  BSA (m2): 1.92 (05-30 @ 15:17)      Vitals:  T(F): 98.9 (31 May 2021 07:50), Max: 100.8 (30 May 2021 16:32)  HR: 71 (31 May 2021 07:50)  BP: 119/67 (31 May 2021 07:50)  RR: 20 (31 May 2021 07:50)  SpO2: 97% (31 May 2021 07:50) (97% - 99%)  temp max in last 48H T(F): , Max: 100.8 (05-30-21 @ 16:32)      Current Antibiotics:  piperacillin/tazobactam IVPB.. 3.375 Gram(s) IV Intermittent every 8 hours  vancomycin  IVPB 1250 milliGRAM(s) IV Intermittent every 8 hours    Other medications:  mirtazapine Soltab 15 milliGRAM(s) Oral at bedtime  pantoprazole  Injectable 40 milliGRAM(s) IV Push two times a day  sucralfate suspension 1 Gram(s) Oral four times a day                          9.6    5.26  )-----------( 136      ( 31 May 2021 07:24 )             32.1     05-31    144  |  108<H>  |  13.3  ----------------------------<  95  3.9   |  28.0  |  0.58    Ca    8.3<L>      31 May 2021 07:24  Phos  2.5     05-31  Mg     1.9     05-31    TPro  6.8  /  Alb  4.1  /  TBili  1.6  /  DBili  x   /  AST  173<H>  /  ALT  144<H>  /  AlkPhos  75  05-30    RECENT CULTURES:  05-29 @ 07:04 .Blood Blood-Peripheral aerobic Blood Culture PCR    Growth in aerobic bottle: Gram Positive Cocci in Pairs and Chains and  Gram Variable Rods  Gram Stain and BCID performed by:  [facility]  [address] [place] [address]  ***Blood Panel PCR results on this specimen are available  approximately 3 hours after the Gram stain result.***  Gram stain, PCR, and/or culture results may not always  correspond due to difference in methodologies.  ************************************************************  ThisPCR assay was performed using [employer]  The following targets are tested for: Enterococcus,  vancomycin resistant enterococci, Listeria monocytogenes,  coagulase negative staphylococci, S. aureus,  methicillin resistant S. aureus, Streptococcus agalactiae  (Group B), S. pneumoniae, S. pyogenes (Group A),  Acinetobacter baumannii, Enterobacter cloacae, E. coli,  Klebsiella oxytoca, K. pneumoniae, Proteus sp.,  Serratia marcescens, Haemophilus influenzae,  Neisseria meningitidis, Pseudomonas aeruginosa, Candida  albicans, C. glabrata, C krusei, C parapsilosis,  C. tropicalis and the KPC resistance gene.  "Due to technical problems, Pseudomonas aeruginosa  until further notice".      WBC Count: 5.26 K/uL (05-31-21 @ 07:24)  WBC Count: 10.08 K/uL (05-30-21 @ 15:56)  WBC Count: 10.26 K/uL (05-29-21 @ 07:05)  WBC Count: 6.52 K/uL (05-26-21 @ 12:08)    Creatinine, Serum: 0.58 mg/dL (05-31-21 @ 07:24)  Creatinine, Serum: 0.68 mg/dL (05-30-21 @ 15:48)  Creatinine, Serum: 0.90 mg/dL (05-29-21 @ 07:05)  Creatinine, Serum: 0.72 mg/dL (05-26-21 @ 12:07)    COVID-19 PCR: NotDetec (05-30-21 @ 17:19)  COVID-19 PCR: NotDetec (05-23-21 @ 05:56)  COVID-19 PCR: NotDetec (05-16-21 @ 14:22)  COVID-19 PCR: NotDetec (05-13-21 @ 22:42)    HIV-1/2 Antigen/Antibody Screen by [facility] (05.15.21 @ 12:11)    HIV-1/2 Combo Result: Nonreact: The HIV Ag/Ab Combo test performed screens for HIV-1 p24 antigen,  antibodies to HIV-1 (group M and group O), and antibodies to HIV-2. All  specimens repeatedly reactive will reflex to an HIV 1/2 antibody  confirmation and differentiation test. This assay detects p24 antigen  which may be present prior to the development of HIV antibodies,  therefore a reactive result with a negative HIV 1/2 AB Confirmation  should be followed up with HIV-1 RNA, HIV-2 RNA and repeat testing in 4-8  weeks. A nonreactive result does not preclude previous exposure to or  infection with HIV-1 or HIV-2.          < from: CT Abdomen and Pelvis w/ IV Cont (05.30.21 @ 17:40) >  EXAM:  CT ABDOMEN AND PELVIS IC                        EXAM:  CT CHEST IC                          PROCEDURE DATE:  05/30/2021      INTERPRETATION:  CLINICAL INFORMATION: Sepsis. History of aspiration pneumonia and sepsis. Esophageal stent for stricture. Positive blood cultures. Discharged 2 days ago came back with opioid overdose.    COMPARISON: 5/18/2021 and 5/16/2021    CONTRAST/COMPLICATIONS:  IV Contrast: Omnipaque 300  95 cc administered   5 cc discarded  Oral Contrast: NONE  Complications: None reported at time of study completion    PROCEDURE:  CT of the Chest, Abdomen and Pelvis was performed.  Sagittal and coronal reformats were performed.    FINDINGS:  CHEST:  LUNGS AND LARGE AIRWAYS: Groundglass opacities in the medial aspect of both upper lobes anteriorly. Improved consolidation at both bases with minimal residual. Findings represent sequela of prior aspiration pneumonia. Bilateral dependent atelectasis. No pulmonary nodules.  PLEURA: No pleural effusion.  VESSELS: Within normal limits.  HEART: Heart size is normal. No pericardial effusion.  MEDIASTINUM AND JOHN: No lymphadenopathy.  CHEST WALL AND LOWER NECK: Within normal limits.    ABDOMEN AND PELVIS:  LIVER: Within normal limits.  BILE DUCTS: Normal caliber.  GALLBLADDER: Within normal limits.  SPLEEN: Prominent but stable spleen.  PANCREAS: Within normal limits.  ADRENALS: Within normal limits.  KIDNEYS/URETERS: Punctate calculi in the lower pole of the RIGHT kidney.    BLADDER: Within normal limits.  REPRODUCTIVE ORGANS: Normal prostate    BOWEL: Markedly thickened esophagus. Esophageal stent is not visualized in situ. The esophageal stent is noted to be within a loop of small bowel within the RIGHT lower quadrant. No evidence of bowel obstruction. Appendix is not visualized. No evidence of inflammation in the pericecal region.  PERITONEUM: No ascites.  VESSELS: Within normal limits.  RETROPERITONEUM/LYMPH NODES: No lymphadenopathy.  ABDOMINAL WALL: Within normal limits.  BONES: Within normal limits.    IMPRESSION:  1.  Migrated esophageal stent now located within the distal small bowel in the RIGHT lower quadrant.  2.  Marked esophageal wall thickening especially at the distal esophagus.  3.  Improved bilateral lower lobe aspiration pneumonia with minimal residual.  4.  Punctate nonobstructing renal calculi within the lower pole of the RIGHT kidney.    < end of copied text >       HPI:  27M pmh IVDA, esophageal stricture s/p Dilitation to 10 mm and fully covered SEMS placement on 5/25/21.  Discharged on 5/28.  Pt called in to ER for positive blood culture. Patient had recent admission after using heroin and presented with septic shock and found to have multi focal pna and subsequently intubated and brought to icu. Patient extubated ,  downgraded to medicine. completed course of IV Zosyn. Patient worked up for severe esophageal stricture. The patient was seen in consultation by Thoracic surgery and GI. S/P esophageal  stent paced by  GI endoscopy on 5/25. Patient eventually discharged home.   He presented to ER on 5/29 after injecting oxy and felt feverish.  He eventually left AMA. On 5/30,  blood culture came back positive and he was told to come back in.     Upon presenting to ER, patient found to be septic with fever, 100.4,  and tachcyardia. CT chest abdomen pelvis showed that esophageal stent has migrated to distal small bowel. GI called and stated will see patient in AM.  Notes nausea. He states he's been eating okay and tolerating a mechanical soft diet with thin liquids.   Last bowel movement this morning was brown.  Stent not seen in feces.  . Denies abdominal pain or chest pain. Nom dysphagia or odynophagia.   Patient admits to injecting heroin multiple times the last couple of days. He claims he uses clean needles.    Esophagus had been dilated to 10 mm and a 14 mm stent was initially inserted.        PAST MEDICAL & SURGICAL HISTORY:  Heroin abuse    Cocaine abuse    GERD (gastroesophageal reflux disease)    Hiatal hernia    History of esophageal stricture    Migration of pancreatic stent    Migrated esophageal stent        ROS:  No Heartburn, regurgitation, dysphagia, odynophagia.  No dyspepsia  No abdominal pain.    No Nausea, vomiting.  No Bleeding.  No hematemesis.   No diarrhea.    No hematochesia.  No weight loss, anorexia.  No edema.      MEDICATIONS  (STANDING):  mirtazapine Soltab 15 milliGRAM(s) Oral at bedtime  pantoprazole  Injectable 40 milliGRAM(s) IV Push two times a day  piperacillin/tazobactam IVPB.. 3.375 Gram(s) IV Intermittent every 8 hours  sucralfate suspension 1 Gram(s) Oral four times a day  vancomycin  IVPB 1000 milliGRAM(s) IV Intermittent every 8 hours    MEDICATIONS  (PRN):  acetaminophen   Tablet .. 650 milliGRAM(s) Oral every 6 hours PRN Temp greater or equal to 38C (100.4F), Mild Pain (1 - 3), Moderate Pain (4 - 6)      Allergies    No Known Allergies    Intolerances        SOCIAL HISTORY:    FAMILY HISTORY:  FH: thyroid disease (Grandparent)        Vital Signs Last 24 Hrs  T(C): 37.1 (31 May 2021 11:35), Max: 38.2 (30 May 2021 16:32)  T(F): 98.7 (31 May 2021 11:35), Max: 100.8 (30 May 2021 16:32)  HR: 94 (31 May 2021 11:35) (62 - 138)  BP: 109/65 (31 May 2021 11:35) (102/50 - 126/58)  BP(mean): --  RR: 20 (31 May 2021 11:35) (16 - 20)  SpO2: 99% (31 May 2021 11:35) (97% - 99%)    PHYSICAL EXAM:    GENERAL: NAD, well-groomed, well-developed  HEAD:  Atraumatic, Normocephalic  EYES: EOMI, PERRLA, conjunctiva and sclera clear  ENMT: No tonsillar erythema, exudates, or enlargement; Moist mucous membranes, Good dentition, No lesions  NECK: Supple, No JVD, Normal thyroid  CHEST/LUNG: Clear to percussion bilaterally; No rales, rhonchi, wheezing, or rubs  HEART: Regular rate and rhythm; No murmurs, rubs, or gallops  ABDOMEN: Soft, Nontender, Nondistended; Bowel sounds present.  No HSM.  No MTR.  No distention.    EXTREMITIES:  2+ Peripheral Pulses, No clubbing, cyanosis, or edema  LYMPH: No lymphadenopathy noted  SKIN: No rashes or lesions      LABS:                        9.6    5.26  )-----------( 136      ( 31 May 2021 07:24 )             32.1     05-31    144  |  108<H>  |  13.3  ----------------------------<  95  3.9   |  28.0  |  0.58    Ca    8.3<L>      31 May 2021 07:24  Phos  2.5     05-31  Mg     1.9     05-31    TPro  6.8  /  Alb  4.1  /  TBili  1.6  /  DBili  x   /  AST  173<H>  /  ALT  144<H>  /  AlkPhos  75  05-30    PT/INR - ( 30 May 2021 15:48 )   PT: 15.0 sec;   INR: 1.31 ratio         PTT - ( 30 May 2021 15:48 )  PTT:29.5 sec       LIVER FUNCTIONS - ( 30 May 2021 15:48 )  Alb: 4.1 g/dL / Pro: 6.8 g/dL / ALK PHOS: 75 U/L / ALT: 144 U/L / AST: 173 U/L / GGT: x               RADIOLOGY & ADDITIONAL STUDIES:  CT A/P:  Migrated stent in distal small bowel in RLQ.  No bowel inflammation or distention.    Chest BL resolving infiltrates in lower lobes, c/w resolving PNA.   Psych NP Marilu Pike and therapist Daysi Em at [facility] [phone] called, message left, awaiting call back

## 2022-07-21 NOTE — BH INPATIENT PSYCHIATRY ASSESSMENT NOTE - HPI (INCLUDE ILLNESS QUALITY, SEVERITY, DURATION, TIMING, CONTEXT, MODIFYING FACTORS, ASSOCIATED SIGNS AND SYMPTOMS)
Per Crisis Clinic handoff from MANUELA Carter on 7/21/22: "Patient is a 31-year-old male, , domiciled with wife, sister and brother-in-law, father and MIL, currently employed FT at [employer], no children, dx with social anxiety d/o diagnosed around age 7/8, depression and anxiety, currently in treatment  with a psych NP Marilu Pike for 3-4months, and therapist Daysi Barrientos for 3-4 months both located at [facility] [phone], currently on Lexapro 15mg and Ativan 1mg every other day, previous trial on Zoloft and Prozac but complain of side effects and possible dissociations, hx of treatment in [facility] in December 2021 for anxious and poor sleep in the context of stressors, no prior inpatient admission, no SA/NSSIB, PMH of GERD, hx of gastritis, hx of esophageal strictures, NKDA, Substance use: ETOH use where he would have 1-2 beers a day – last was 2 nights ago, reports no hx of blackouts/withdrawals. Pt reports hx of marijuana use, smoked “a lot” last years because he felt it help with anxiety, stopped smoking in December 2021 but has 1 puff “couple weeks ago” do to peer pressure. Pt denies all other substance use/abuse, denies access to firearms presents to [facility] seeking inpatient admission. Patient presents with c/o of depression, anxiety, and SI. He is currently in treatment but reports current regiment has not been effective at managing his symptoms. He reports anxious mood with racing thoughts, racing heart, over thinking and excess worries, trouble relaxing, reports muscle tremors when he is anxious. Patient reports poor sleep where he gets 3-4 hours at night, c/o feeling tired and fatigued during the day, feels unmotivated, lays in bed all day, reports poor focus and concentration, reports poor appetite with suspected weight loss. He attempts to use coping skills such as meditation, grounding techniques and distraction but it has not been effective. He reports SI with multiple methods. This morning he has thoughts/urges to stab self in the neck with a pen he was using. He states at work there are “very tall ladders” and have thoughts of climbing and jumping. He also reports access to box cutters with thoughts to cut self with suicidal intent. He reports thought to OD on his left-over medication, specifically propranolol to “stop my heart and die.” He also mentioned CO2 poison, reports blocking his tail pipe and dying by CO2 asphyxiation or by hanging himself. He reports there is a “good chance” he would act on one of these methods if he went home and was unable to safety plan. He denies HI jose or psychosis.     Medication trial: He has been on Lexapro since 2020 prescribed by PMD. He was seen in [facility] in December 2021 with c/o poor sleep and worsened anxiety and started in Hydroxyzine. He went to the ED that same day with GI complaints, admitted medically for 2 days then discharge on Ativan. He connected to treatment in March 2022 with current providers. Lexapro increased to 20mg and restarted on Ativan 1mg, felt anxiety was worst and Lexapro reduced back to 15mg 1 month ago. NP attempted to cross jered to Paxil, but anxiety worsened, and it was discontinued. Tried propranolol which lowed his heart rate, but anxiety continued. Currently on Lexapro 15mg, Ativan 1mg every over day, hydroxyzine 25mg PRN.  PMH: hx of GERD, hx of gastritis, hx of esophageal strictures, NKDA.   Family Hx: Pt reports his mother carries diagnoses of Bipolar D/O and OCD, reports she has hx of suicide attempts and psychiatric admissions. Pt reports his paternal uncle is diagnosed with autism. Pt reports his maternal grandfather had hx of alcohol use d/o.   Current medication: Lexapro 15mg, Ativan 1mg every other day, Prilosec 40mg daily, Dicyclomine 10mg once a day (no formal dx with IBS), hydroxyzine 25mg PRN     Collateral from Wife Iman: Reports patient is “desperate” to for help, aware of SI, patient states he does not care if he lives or dies but denies patient has mentioned ways in which he can take his life. In support of him being in the hospital.    Unable to reach current providers, called and left VM."

## 2022-07-21 NOTE — BH INPATIENT PSYCHIATRY ASSESSMENT NOTE - DESCRIPTION
, domiciled with wife, sister and brother-in-law, father and MIL, currently employed FT at [employer], no children, dx with social anxiety d/o diagnosed around age [age]

## 2022-07-21 NOTE — BH INPATIENT PSYCHIATRY ASSESSMENT NOTE - NSBHATTESTAPPBILLTIME_PSY_A_CORE
I attest my time as LOS is greater than 50% of the total combined time spent on qualifying patient care activities. I have reviewed and verified the documentation.

## 2022-07-21 NOTE — BH SOCIAL WORK INITIAL PSYCHOSOCIAL EVALUATION - DETAILS
Pt reports father's side with alcohol abuse, and reports mother with bipolar disorder, OCD, and DID.

## 2022-07-21 NOTE — BH PATIENT PROFILE - FALL HARM RISK - UNIVERSAL INTERVENTIONS
Bed in lowest position, wheels locked, appropriate side rails in place/Call bell, personal items and telephone in reach/Instruct patient to call for assistance before getting out of bed or chair/Non-slip footwear when patient is out of bed/[place] to call system/Physically safe environment - no spills, clutter or unnecessary equipment/Purposeful Proactive Rounding/Room/bathroom lighting operational, light cord in reach

## 2022-07-21 NOTE — BH SOCIAL WORK INITIAL PSYCHOSOCIAL EVALUATION - NSCMSPTSTRENGTHS_PSY_ALL_CORE
Compliance to treatment/Expressive of emotions/Future/goal oriented/Highly motivated for treatment/Humor/Intelligence/Interpersonal skills/Physically healthy/Strong support system

## 2022-07-21 NOTE — BH SOCIAL WORK INITIAL PSYCHOSOCIAL EVALUATION - OTHER PAST PSYCHIATRIC HISTORY (INCLUDE DETAILS REGARDING ONSET, COURSE OF ILLNESS, INPATIENT/OUTPATIENT TREATMENT)
Patient is a 31 year old male,  and domiciled with wife and her family, currently employed at [employer] but has been endorsing difficulties going into work recently, with a prior psychiatric history of anxiety since childhood. Patient has not had any inpatient psychiatric hospitalizations, is currently in treatment at [facility] with NP Marilu Pike and therapist Daysi Barrientos. Patient reports a medical history of GERD, gastritis (states potentially in response to anxiety), and asthma. Patient reports that he used to use marijuana heavily for 1 year last 1 year ago, and social drinking on special occasions. Patient brought self to the [facility] seeking inpatient admission as he was experiencing SI for past few days in the context of worsening anxiety and feeling "trapped" without relief. Patient reports that he had a traumatic experience in 2018 at his [employer] job causing him to leave, and that he experienced heavy anxiety around that time. He reports that ever since then, during jobs he has obtained throughout the years he would do "okay" for a few months and then start to experience similar worsening anxiety and would end up leaving the jobs. He describes a loss of appetite, poor sleep, and multiple somatic sensations primarily GI during the last 3 weeks. Patient reports often experiencing physical manifestations of anxiety through his GI system.

## 2022-07-21 NOTE — BH INPATIENT PSYCHIATRY ASSESSMENT NOTE - NSBHMETABOLIC_PSY_ALL_CORE_FT
BMI: BMI (kg/m2): 35.1 (07-21-22 @ 14:23)  HbA1c: A1C with Estimated Average Glucose Result: 4.9 % (12-15-21 @ 04:04)    Glucose: POCT Blood Glucose.: 101 mg/dL (12-15-21 @ 00:29)    BP: 124/80 (07-21-22 @ 14:25) (114/87 - 124/80)  Lipid Panel: Date/Time: 12-15-21 @ 04:04  Cholesterol, Serum: 170  Direct LDL: --  HDL Cholesterol, Serum: 31  Total Cholesterol/HDL Ration Measurement: --  Triglycerides, Serum: 70

## 2022-07-21 NOTE — BH INPATIENT PSYCHIATRY ASSESSMENT NOTE - NSBHASSESSSUMMFT_PSY_ALL_CORE
Patient is a 31-year-old male, , domiciled with wife, sister and brother-in-law, father and MIL, currently employed FT at [employer], no children, dx with social anxiety d/o diagnosed around age 9/8, currently in treatment  with a psych NP Marilu Pike for 3-4months, and therapist Daysi Barrientos for 3-4 months both located at [facility] [phone], currently on Lexapro 15mg and Ativan 1mg every other day, previous trial on Zoloft and Prozac but complain of side effects and possible dissociations, hx of treatment in [facility] in December 2021 for anxious and poor sleep in the context of stressors, no prior inpatient admission, no SA/NSSIB, PMH of GERD, hx of gastritis, hx of esophageal strictures, NKDA, Substance use: ETOH use where he would have 1-2 beers a day – last was 2 nights ago, reports no hx of blackouts/withdrawals. Pt reports hx of marijuana use, smoked “a lot” last years because he felt it help with anxiety, stopped smoking in December 2021 but has 1 puff “couple weeks ago” do to peer pressure. Pt denies all other substance use/abuse, denies access to firearms presents to [facility] seeking inpatient admission. Patients endorse depression and anxious mood, SI with access to methods/means and is unable to safety plan in the community. He reports SI with multiple methods. This morning he has thoughts/urges to stab self in the neck with a pen he was using. He states at work there are “very tall ladders” and have thoughts of climbing and jumping. He also reports access to box cutters with thoughts to cut self with suicidal intent. He reports thought to OD on his left-over medication, specifically propranolol to “stop my heart and die.” He also mentioned CO2 poison, reports blocking his tail pipe and dying by CO2 asphyxiation or by hanging himself. He reports there is a “good chance” he would act on one of these methods if he went home and was unable to safety plan. He denies HI jose or psychosis.     Pt seen with SW present. Pt hyperverbal, overinclusive, anxious, reports crying spells, racing thoughts, shaking to the point of not being able to walk because of anxiety, muscle spasms from anxiety, poor sleep, poor appetite, anhedonia, anergia, amotivation, excessive guilt, strong familial h/o mental illness including his mother he describes as bipolar and has multiple personality disorder, poor stress tolerance, cites stress when working (worked for [employer]), reports "trauma" from being overworked, states he feels "trapped in myself" and will do "anything and everything" to stop his anxiety and has had more suicidal thoughts. Pt denies intent to act on these thoughts, "I want to live." Pt agrees to come to staff immediately with any safety concerns on the unit. Pt states he feels safe on the unit. Pt has been ruminating on a comment from his mother in law that he is "not tough" when he quit a job recently and felt she was kicking him when he was down. Pt states that he has been "obsessing" over a chipped right lower molar and infected left upper molar and that when his tongue touches these areas he feels overwhelmed by all the things he needs to do and is unable to take time off to address his dental concerns. Pt states this has been ongoing x 1 month. Dental consult ordered. Pt reports multiple GI issues secondary to anxiety. Pt denies sxs of jose, A/VH, psychosis, or paranoia. Pt reports poor adherence to lexapro - was prescribed 15 mg PO QD and reduced this recently to 10 mg before increasing it back to 15 mg. Pt gave HIPPA release to speak to psych NP/therapist and wife Reina.     PMH: GERD, asthma, h/o gastritis, h/o esophageal strictures  Allergies: augmentin (stomach upset)  Substances: heavy cannabis use Nov 2020 - Dec 2021, rare ETOH (last on Saturday, 1 beer for wife's bday). denies tobacco products or other substances of abuse    PLAN:   -voluntary admission to [facility] 3  -routine checks as pt able to safety plan and denies intent or plan to self harm on the unit  -labs 7/22/22 AM: CBC w/diff, CMP, TSH, lipid panel, A1c, EKG, UA, UTOX  -consults: dental and nutrition   -re-start home meds of lexapro 15 mg PO QD and hydroxyzine 25 mg PO PRN Q 6 hours   -increase ativan to 1 mg PO QD (has Rx for ativan 0.5 mg PO QD last filled on 6/20/22 for 30 day supply from [facility] [phone])  -per pt request, changed prilosec to protonix 40 mg PO QD before breakfast   -hold dicyclomine for now as pt stated his GI issues were better on protonix  -PO/IM PRNs haldol, ativan, benadryl for agitation  -referred to psychology for individual therapy  -encourage milieu/group therapy  Patient is a 31-year-old male, , domiciled with wife, sister and brother-in-law, father and MIL, currently employed FT at [employer], no children, dx with social anxiety d/o diagnosed around age 9/8, currently in treatment  with a psych NP Marilu Pike for 3-4months, and therapist Daysi Barrientos for 3-4 months both located at [facility] [phone], currently on Lexapro 15mg and Ativan 1mg every other day, previous trial on Zoloft and Prozac but complain of side effects and possible dissociations, hx of treatment in [facility] in December 2021 for anxious and poor sleep in the context of stressors, no prior inpatient admission, no SA/NSSIB, PMH of GERD, hx of gastritis, hx of esophageal strictures, NKDA, Substance use: ETOH use where he would have 1-2 beers a day – last was 2 nights ago, reports no hx of blackouts/withdrawals. Pt reports hx of marijuana use, smoked “a lot” last years because he felt it help with anxiety, stopped smoking in December 2021 but has 1 puff “couple weeks ago” do to peer pressure. Pt denies all other substance use/abuse, denies access to firearms presents to [facility] seeking inpatient admission. Patients endorse depression and anxious mood, SI with access to methods/means and is unable to safety plan in the community. He reports SI with multiple methods. This morning he has thoughts/urges to stab self in the neck with a pen he was using. He states at work there are “very tall ladders” and have thoughts of climbing and jumping. He also reports access to box cutters with thoughts to cut self with suicidal intent. He reports thought to OD on his left-over medication, specifically propranolol to “stop my heart and die.” He also mentioned CO2 poison, reports blocking his tail pipe and dying by CO2 asphyxiation or by hanging himself. He reports there is a “good chance” he would act on one of these methods if he went home and was unable to safety plan. He denies HI jose or psychosis.     Pt seen with SW present. Pt hyperverbal, overinclusive, anxious, reports crying spells, racing thoughts, shaking to the point of not being able to walk because of anxiety, muscle spasms from anxiety, poor sleep, poor appetite, anhedonia, anergia, amotivation, excessive guilt, strong familial h/o mental illness including his mother he describes as bipolar and has multiple personality disorder, poor stress tolerance, cites stress when working (worked for [employer]), reports "trauma" from being overworked, states he feels "trapped in myself" and will do "anything and everything" to stop his anxiety and has had more suicidal thoughts. Pt denies intent to act on these thoughts, "I want to live." Pt agrees to come to staff immediately with any safety concerns on the unit. Pt states he feels safe on the unit. Pt has been ruminating on a comment from his mother in law that he is "not tough" when he quit a job recently and felt she was kicking him when he was down. Pt states that he has been "obsessing" over a chipped right lower molar and infected left upper molar and that when his tongue touches these areas he feels overwhelmed by all the things he needs to do and is unable to take time off to address his dental concerns. Pt states this has been ongoing x 1 month. Dental consult ordered. Pt reports multiple GI issues secondary to anxiety. Pt denies sxs of jose, A/VH, psychosis, or paranoia. Pt reports poor adherence to lexapro - was prescribed 15 mg PO QD and reduced this recently to 10 mg before increasing it back to 15 mg. Pt gave HIPPA release to speak to psych NP/therapist and wife Reina.     PMH: GERD, asthma, h/o gastritis, h/o esophageal strictures  Allergies: augmentin (stomach upset)  Substances: heavy cannabis use Nov 2020 - Dec 2021, rare ETOH (last on Saturday, 1 beer for wife's bday). denies tobacco products or other substances of abuse    PLAN:   -voluntary admission to [facility] 3  -routine checks as pt able to safety plan and denies intent or plan to self harm on the unit  -labs 7/22/22 AM: CBC w/diff, CMP, TSH, lipid panel, A1c, EKG, UA, UTOX  -consults: dental and nutrition   -re-start home meds of lexapro 15 mg PO QD and hydroxyzine 25 mg PO PRN Q 6 hours   -increase ativan to 1 mg PO QD (has Rx for ativan 0.5 mg PO QD last filled on 6/20/22 for 30 day supply from [facility] [phone])  -per pt request, changed prilosec to protonix 40 mg PO QD before breakfast   -hold dicyclomine for now as pt stated his GI issues were better on protonix  -start albuterol 2 puffs inhalation PRN Q 6 hours for SOB/wheezing   -PO/IM PRNs haldol, ativan, benadryl for agitation  -referred to psychology for individual therapy  -encourage milieu/group therapy

## 2022-07-21 NOTE — BH INPATIENT PSYCHIATRY ASSESSMENT NOTE - CURRENT MEDICATION
MEDICATIONS  (STANDING):  escitalopram 15 milliGRAM(s) Oral daily  LORazepam     Tablet 1 milliGRAM(s) Oral daily  pantoprazole    Tablet 40 milliGRAM(s) Oral before breakfast    MEDICATIONS  (PRN):  diphenhydrAMINE 50 milliGRAM(s) Oral every 6 hours PRN agitation  diphenhydrAMINE Injectable 50 milliGRAM(s) IntraMuscular once PRN agitation  haloperidol     Tablet 5 milliGRAM(s) Oral every 6 hours PRN agitation  haloperidol    Injectable 5 milliGRAM(s) IntraMuscular once PRN agitation  hydrOXYzine hydrochloride 25 milliGRAM(s) Oral every 6 hours PRN anxiety  LORazepam     Tablet 2 milliGRAM(s) Oral every 6 hours PRN agitation   MEDICATIONS  (STANDING):  escitalopram 15 milliGRAM(s) Oral daily  LORazepam     Tablet 1 milliGRAM(s) Oral daily  pantoprazole    Tablet 40 milliGRAM(s) Oral before breakfast    MEDICATIONS  (PRN):  ALBUTerol    90 MICROgram(s) HFA Inhaler 2 Puff(s) Inhalation every 6 hours PRN asthma  diphenhydrAMINE 50 milliGRAM(s) Oral every 6 hours PRN agitation  diphenhydrAMINE Injectable 50 milliGRAM(s) IntraMuscular once PRN agitation  haloperidol     Tablet 5 milliGRAM(s) Oral every 6 hours PRN agitation  haloperidol    Injectable 5 milliGRAM(s) IntraMuscular once PRN agitation  hydrOXYzine hydrochloride 25 milliGRAM(s) Oral every 6 hours PRN anxiety  LORazepam     Tablet 2 milliGRAM(s) Oral every 6 hours PRN agitation

## 2022-07-21 NOTE — BH INPATIENT PSYCHIATRY ASSESSMENT NOTE - DETAILS
Pt reports his mother carries diagnoses of Bipolar D/O and OCD, reports she has hx of suicide attempts and psychiatric admissions. Pt reports his paternal uncle is diagnosed with autism. Pt reports his maternal grandfather had hx of alcohol use d/o.

## 2022-07-21 NOTE — BH INPATIENT PSYCHIATRY ASSESSMENT NOTE - OTHER PAST PSYCHIATRIC HISTORY (INCLUDE DETAILS REGARDING ONSET, COURSE OF ILLNESS, INPATIENT/OUTPATIENT TREATMENT)
hx of treatment in [facility] in December 2021 for anxious and poor sleep in the context of stressors, no prior inpatient admission

## 2022-07-21 NOTE — BH INPATIENT PSYCHIATRY ASSESSMENT NOTE - NSBHCHARTREVIEWVS_PSY_A_CORE FT
Vital Signs Last 24 Hrs  T(C): 37.2 (07-21-22 @ 14:23), Max: 37.2 (07-21-22 @ 14:23)  T(F): 99 (07-21-22 @ 14:23), Max: 99 (07-21-22 @ 14:23)  HR: 128 (07-21-22 @ 14:25) (128 - 139)  BP: 124/80 (07-21-22 @ 14:25) (114/87 - 124/80)  BP(mean): --  RR: --  SpO2: --

## 2022-07-22 DIAGNOSIS — F41.0 PANIC DISORDER [EPISODIC PAROXYSMAL ANXIETY]: ICD-10-CM

## 2022-07-22 LAB
A1C WITH ESTIMATED AVERAGE GLUCOSE RESULT: 5.1 % — SIGNIFICANT CHANGE UP (ref 4–5.6)
ALBUMIN SERPL ELPH-MCNC: 4.7 G/DL — SIGNIFICANT CHANGE UP (ref 3.3–5)
ALP SERPL-CCNC: 90 U/L — SIGNIFICANT CHANGE UP (ref 40–120)
ALT FLD-CCNC: 25 U/L — SIGNIFICANT CHANGE UP (ref 4–41)
AMPHET UR-MCNC: NEGATIVE — SIGNIFICANT CHANGE UP
ANION GAP SERPL CALC-SCNC: 13 MMOL/L — SIGNIFICANT CHANGE UP (ref 7–14)
APPEARANCE UR: CLEAR — SIGNIFICANT CHANGE UP
AST SERPL-CCNC: 22 U/L — SIGNIFICANT CHANGE UP (ref 4–40)
BARBITURATES UR SCN-MCNC: NEGATIVE — SIGNIFICANT CHANGE UP
BASOPHILS # BLD AUTO: 0.04 K/UL — SIGNIFICANT CHANGE UP (ref 0–0.2)
BASOPHILS NFR BLD AUTO: 0.5 % — SIGNIFICANT CHANGE UP (ref 0–2)
BENZODIAZ UR-MCNC: NEGATIVE — SIGNIFICANT CHANGE UP
BILIRUB SERPL-MCNC: 0.8 MG/DL — SIGNIFICANT CHANGE UP (ref 0.2–1.2)
BILIRUB UR-MCNC: NEGATIVE — SIGNIFICANT CHANGE UP
BUN SERPL-MCNC: 17 MG/DL — SIGNIFICANT CHANGE UP (ref 7–23)
CALCIUM SERPL-MCNC: 9.7 MG/DL — SIGNIFICANT CHANGE UP (ref 8.4–10.5)
CHLORIDE SERPL-SCNC: 99 MMOL/L — SIGNIFICANT CHANGE UP (ref 98–107)
CHOLEST SERPL-MCNC: 192 MG/DL — SIGNIFICANT CHANGE UP
CO2 SERPL-SCNC: 24 MMOL/L — SIGNIFICANT CHANGE UP (ref 22–31)
COCAINE METAB.OTHER UR-MCNC: NEGATIVE — SIGNIFICANT CHANGE UP
COLOR SPEC: YELLOW — SIGNIFICANT CHANGE UP
COVID-19 SPIKE DOMAIN AB INTERP: POSITIVE
COVID-19 SPIKE DOMAIN ANTIBODY RESULT: >250 U/ML — HIGH
CREAT SERPL-MCNC: 0.99 MG/DL — SIGNIFICANT CHANGE UP (ref 0.5–1.3)
CREATININE URINE RESULT, DAU: 240 MG/DL — SIGNIFICANT CHANGE UP
DIFF PNL FLD: NEGATIVE — SIGNIFICANT CHANGE UP
EGFR: 106 ML/MIN/1.73M2 — SIGNIFICANT CHANGE UP
EOSINOPHIL # BLD AUTO: 0.04 K/UL — SIGNIFICANT CHANGE UP (ref 0–0.5)
EOSINOPHIL NFR BLD AUTO: 0.5 % — SIGNIFICANT CHANGE UP (ref 0–6)
ESTIMATED AVERAGE GLUCOSE: 100 — SIGNIFICANT CHANGE UP
GLUCOSE SERPL-MCNC: 97 MG/DL — SIGNIFICANT CHANGE UP (ref 70–99)
GLUCOSE UR QL: NEGATIVE — SIGNIFICANT CHANGE UP
HCT VFR BLD CALC: 46.7 % — SIGNIFICANT CHANGE UP (ref 39–50)
HDLC SERPL-MCNC: 32 MG/DL — LOW
HGB BLD-MCNC: 16.4 G/DL — SIGNIFICANT CHANGE UP (ref 13–17)
IANC: 5.83 K/UL — SIGNIFICANT CHANGE UP (ref 1.8–7.4)
IMM GRANULOCYTES NFR BLD AUTO: 0.3 % — SIGNIFICANT CHANGE UP (ref 0–1.5)
KETONES UR-MCNC: NEGATIVE — SIGNIFICANT CHANGE UP
LEUKOCYTE ESTERASE UR-ACNC: NEGATIVE — SIGNIFICANT CHANGE UP
LIPID PNL WITH DIRECT LDL SERPL: 147 MG/DL — HIGH
LYMPHOCYTES # BLD AUTO: 1.26 K/UL — SIGNIFICANT CHANGE UP (ref 1–3.3)
LYMPHOCYTES # BLD AUTO: 16.6 % — SIGNIFICANT CHANGE UP (ref 13–44)
MCHC RBC-ENTMCNC: 30.5 PG — SIGNIFICANT CHANGE UP (ref 27–34)
MCHC RBC-ENTMCNC: 35.1 GM/DL — SIGNIFICANT CHANGE UP (ref 32–36)
MCV RBC AUTO: 86.8 FL — SIGNIFICANT CHANGE UP (ref 80–100)
METHADONE UR-MCNC: NEGATIVE — SIGNIFICANT CHANGE UP
MONOCYTES # BLD AUTO: 0.42 K/UL — SIGNIFICANT CHANGE UP (ref 0–0.9)
MONOCYTES NFR BLD AUTO: 5.5 % — SIGNIFICANT CHANGE UP (ref 2–14)
NEUTROPHILS # BLD AUTO: 5.83 K/UL — SIGNIFICANT CHANGE UP (ref 1.8–7.4)
NEUTROPHILS NFR BLD AUTO: 76.6 % — SIGNIFICANT CHANGE UP (ref 43–77)
NITRITE UR-MCNC: NEGATIVE — SIGNIFICANT CHANGE UP
NON HDL CHOLESTEROL: 160 MG/DL — HIGH
NRBC # BLD: 0 /100 WBCS — SIGNIFICANT CHANGE UP
NRBC # FLD: 0 K/UL — SIGNIFICANT CHANGE UP
OPIATES UR-MCNC: NEGATIVE — SIGNIFICANT CHANGE UP
OXYCODONE UR-MCNC: NEGATIVE — SIGNIFICANT CHANGE UP
PCP SPEC-MCNC: SIGNIFICANT CHANGE UP
PCP UR-MCNC: NEGATIVE — SIGNIFICANT CHANGE UP
PH UR: 6.5 — SIGNIFICANT CHANGE UP (ref 5–8)
PLATELET # BLD AUTO: 283 K/UL — SIGNIFICANT CHANGE UP (ref 150–400)
POTASSIUM SERPL-MCNC: 4.2 MMOL/L — SIGNIFICANT CHANGE UP (ref 3.5–5.3)
POTASSIUM SERPL-SCNC: 4.2 MMOL/L — SIGNIFICANT CHANGE UP (ref 3.5–5.3)
PROT SERPL-MCNC: 7.7 G/DL — SIGNIFICANT CHANGE UP (ref 6–8.3)
PROT UR-MCNC: ABNORMAL
RBC # BLD: 5.38 M/UL — SIGNIFICANT CHANGE UP (ref 4.2–5.8)
RBC # FLD: 11.9 % — SIGNIFICANT CHANGE UP (ref 10.3–14.5)
SARS-COV-2 IGG+IGM SERPL QL IA: >250 U/ML — HIGH
SARS-COV-2 IGG+IGM SERPL QL IA: POSITIVE
SODIUM SERPL-SCNC: 136 MMOL/L — SIGNIFICANT CHANGE UP (ref 135–145)
SP GR SPEC: 1.03 — SIGNIFICANT CHANGE UP (ref 1–1.05)
THC UR QL: NEGATIVE — SIGNIFICANT CHANGE UP
TRIGL SERPL-MCNC: 63 MG/DL — SIGNIFICANT CHANGE UP
TSH SERPL-MCNC: 1.38 UIU/ML — SIGNIFICANT CHANGE UP (ref 0.27–4.2)
UROBILINOGEN FLD QL: ABNORMAL
WBC # BLD: 7.61 K/UL — SIGNIFICANT CHANGE UP (ref 3.8–10.5)
WBC # FLD AUTO: 7.61 K/UL — SIGNIFICANT CHANGE UP (ref 3.8–10.5)

## 2022-07-22 PROCEDURE — 93010 ELECTROCARDIOGRAM REPORT: CPT

## 2022-07-22 PROCEDURE — 99232 SBSQ HOSP IP/OBS MODERATE 35: CPT

## 2022-07-22 RX ORDER — ESCITALOPRAM OXALATE 10 MG/1
20 TABLET, FILM COATED ORAL DAILY
Refills: 0 | Status: DISCONTINUED | OUTPATIENT
Start: 2022-07-22 | End: 2022-07-29

## 2022-07-22 RX ADMIN — PANTOPRAZOLE SODIUM 40 MILLIGRAM(S): 20 TABLET, DELAYED RELEASE ORAL at 07:47

## 2022-07-22 RX ADMIN — Medication 1 MILLIGRAM(S): at 07:48

## 2022-07-22 RX ADMIN — ESCITALOPRAM OXALATE 15 MILLIGRAM(S): 10 TABLET, FILM COATED ORAL at 07:47

## 2022-07-22 NOTE — DIETITIAN INITIAL EVALUATION ADULT - OTHER INFO
Patient admitted to [facility] d/t depression, anxiety, and SI. Patient endorses improved appetite. Has left upper molar pain (dental consult ordered) but denies difficulty chewing. Denies any GI distress or weight changes. Had no specific food preferences. Discussed healthy meal plan, portion control and increasing physical activity.

## 2022-07-22 NOTE — DIETITIAN INITIAL EVALUATION ADULT - PERTINENT MEDS FT
MEDICATIONS  (STANDING):  escitalopram 15 milliGRAM(s) Oral daily  LORazepam     Tablet 1 milliGRAM(s) Oral daily  pantoprazole    Tablet 40 milliGRAM(s) Oral before breakfast    MEDICATIONS  (PRN):  ALBUTerol    90 MICROgram(s) HFA Inhaler 2 Puff(s) Inhalation every 6 hours PRN asthma  diphenhydrAMINE 50 milliGRAM(s) Oral every 6 hours PRN agitation  diphenhydrAMINE Injectable 50 milliGRAM(s) IntraMuscular once PRN agitation  haloperidol     Tablet 5 milliGRAM(s) Oral every 6 hours PRN agitation  haloperidol    Injectable 5 milliGRAM(s) IntraMuscular once PRN agitation  hydrOXYzine hydrochloride 25 milliGRAM(s) Oral every 6 hours PRN anxiety  LORazepam     Tablet 2 milliGRAM(s) Oral every 6 hours PRN agitation

## 2022-07-22 NOTE — BH INPATIENT PSYCHIATRY PROGRESS NOTE - NSBHASSESSSUMMFT_PSY_ALL_CORE
31 year old man with no inpatient hospitalizations, no suicide attempts, living with wife and inlaws, difficulty holding jobs presents with suicidal ideation, anxious/panic symptoms after ongoing anxious episodes at work which lead him to remain in bed, helpless, hopeless, worthless and SI.  He has been treated as an outpatient for a few months and with lexapro 15mg.  Differential includes MDD, RICKIE, Panic disorder, performance anxiety and personality pathology (dependent? borderline?).      We are increasing lexapro to 20mg daily.  He complained of dental pain, saw dental today and abx were written.    Avoiding more bzd so no standing, prn only.     protonix for gi issues.  -start albuterol 2 puffs inhalation PRN Q 6 hours for SOB/wheezing   -PO/IM PRNs haldol, ativan, benadryl for agitation  -referred to psychology for individual therapy  -encourage milieu/group therapy  31 year old man with no inpatient hospitalizations, no suicide attempts, living with wife and inlaws, difficulty holding jobs presents with suicidal ideation, anxious/panic symptoms after ongoing anxious episodes at work which lead him to remain in bed, helpless, hopeless, worthless and SI.  He has been treated as an outpatient for a few months and with lexapro 15mg.  Differential includes MDD, RICKIE, Panic disorder, performance anxiety and personality pathology (dependent? borderline?).      We are increasing lexapro to 20mg daily.  He complained of dental pain, saw dental today.  no consult in chart.  Will follow up.    Avoiding more bzd so no standing, prn only.     protonix for gi issues.  -start albuterol 2 puffs inhalation PRN Q 6 hours for SOB/wheezing   -PO/IM PRNs haldol, ativan, benadryl for agitation  -referred to psychology for individual therapy  -encourage milieu/group therapy

## 2022-07-22 NOTE — BH INPATIENT PSYCHIATRY PROGRESS NOTE - NSBHCHARTREVIEWVS_PSY_A_CORE FT
Vital Signs Last 24 Hrs  T(C): 36.6 (07-22-22 @ 06:10), Max: 36.6 (07-22-22 @ 06:10)  T(F): 97.9 (07-22-22 @ 06:10), Max: 97.9 (07-22-22 @ 06:10)      Orthostatic VS  07-22-22 @ 06:10  Lying BP: --/-- HR: --  Sitting BP: 138/88 HR: 82  Standing BP: 130/88 HR: 87  Site: --  Mode: --   Vital Signs Last 24 Hrs  T(C): 36.6 (07-22-22 @ 06:10), Max: 36.6 (07-22-22 @ 06:10)  T(F): 97.9 (07-22-22 @ 06:10), Max: 97.9 (07-22-22 @ 06:10)  HR: --  BP: --  BP(mean): --  RR: --  SpO2: --    Orthostatic VS  07-22-22 @ 06:10  Lying BP: --/-- HR: --  Sitting BP: 138/88 HR: 82  Standing BP: 130/88 HR: 87  Site: --  Mode: --

## 2022-07-23 PROCEDURE — 99232 SBSQ HOSP IP/OBS MODERATE 35: CPT

## 2022-07-23 RX ADMIN — Medication 1 MILLIGRAM(S): at 07:40

## 2022-07-23 RX ADMIN — PANTOPRAZOLE SODIUM 40 MILLIGRAM(S): 20 TABLET, DELAYED RELEASE ORAL at 07:40

## 2022-07-23 RX ADMIN — ESCITALOPRAM OXALATE 20 MILLIGRAM(S): 10 TABLET, FILM COATED ORAL at 07:40

## 2022-07-23 NOTE — BH INPATIENT PSYCHIATRY PROGRESS NOTE - NSBHCHARTREVIEWVS_PSY_A_CORE FT
Vital Signs Last 24 Hrs  T(C): 36.8 (07-23-22 @ 08:19), Max: 37.1 (07-22-22 @ 20:25)  T(F): 98.3 (07-23-22 @ 08:19), Max: 98.8 (07-22-22 @ 20:25)  HR: --  BP: --  BP(mean): --  RR: --  SpO2: --    Orthostatic VS  07-23-22 @ 08:19  Lying BP: --/-- HR: --  Sitting BP: 127/81 HR: 91  Standing BP: 137/87 HR: 100  Site: --  Mode: --  Orthostatic VS  07-22-22 @ 20:25  Lying BP: --/-- HR: --  Sitting BP: 148/80 HR: 107  Standing BP: 143/73 HR: 103  Site: --  Mode: --  Orthostatic VS  07-22-22 @ 06:10  Lying BP: --/-- HR: --  Sitting BP: 138/88 HR: 82  Standing BP: 130/88 HR: 87  Site: --  Mode: --

## 2022-07-23 NOTE — BH INPATIENT PSYCHIATRY PROGRESS NOTE - NSBHASSESSSUMMFT_PSY_ALL_CORE
31 year old man with no inpatient hospitalizations, no suicide attempts, living with wife and inlaws, difficulty holding jobs presents with suicidal ideation, anxious/panic symptoms after ongoing anxious episodes at work which lead him to remain in bed, helpless, hopeless, worthless and SI.  He has been treated as an outpatient for a few months and with lexapro 15mg.  Differential includes MDD, RICKIE, Panic disorder, performance anxiety and personality pathology (dependent? borderline?).      We are increasing lexapro to 20mg daily.  He complained of dental pain, saw dental today.  no consult in chart.  Will follow up.    Avoiding more bzd so no standing, prn only.     protonix for gi issues.  -start albuterol 2 puffs inhalation PRN Q 6 hours for SOB/wheezing   -PO/IM PRNs haldol, ativan, benadryl for agitation  -referred to psychology for individual therapy  -encourage milieu/group therapy
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
## 2022-07-24 PROCEDURE — 99232 SBSQ HOSP IP/OBS MODERATE 35: CPT

## 2022-07-24 RX ADMIN — Medication 25 MILLIGRAM(S): at 00:03

## 2022-07-24 RX ADMIN — Medication 1 MILLIGRAM(S): at 08:07

## 2022-07-24 RX ADMIN — PANTOPRAZOLE SODIUM 40 MILLIGRAM(S): 20 TABLET, DELAYED RELEASE ORAL at 07:43

## 2022-07-24 RX ADMIN — ESCITALOPRAM OXALATE 20 MILLIGRAM(S): 10 TABLET, FILM COATED ORAL at 08:04

## 2022-07-24 NOTE — BH INPATIENT PSYCHIATRY PROGRESS NOTE - NSBHCHARTREVIEWVS_PSY_A_CORE FT
Vital Signs Last 24 Hrs  T(C): 36.7 (07-24-22 @ 08:21), Max: 36.9 (07-23-22 @ 19:54)  T(F): 98 (07-24-22 @ 08:21), Max: 98.4 (07-23-22 @ 19:54)  HR: 80 (07-23-22 @ 19:54) (80 - 80)  BP: 128/84 (07-23-22 @ 19:54) (128/84 - 128/84)  BP(mean): --  RR: --  SpO2: --    Orthostatic VS  07-24-22 @ 08:21  Lying BP: --/-- HR: --  Sitting BP: 123/84 HR: 86  Standing BP: --/-- HR: --  Site: --  Mode: --  Orthostatic VS  07-23-22 @ 08:19  Lying BP: --/-- HR: --  Sitting BP: 127/81 HR: 91  Standing BP: 137/87 HR: 100  Site: --  Mode: --  Orthostatic VS  07-22-22 @ 20:25  Lying BP: --/-- HR: --  Sitting BP: 148/80 HR: 107  Standing BP: 143/73 HR: 103  Site: --  Mode: --

## 2022-07-25 PROCEDURE — 99232 SBSQ HOSP IP/OBS MODERATE 35: CPT

## 2022-07-25 PROCEDURE — 90853 GROUP PSYCHOTHERAPY: CPT

## 2022-07-25 RX ORDER — ACETAMINOPHEN 500 MG
650 TABLET ORAL EVERY 6 HOURS
Refills: 0 | Status: DISCONTINUED | OUTPATIENT
Start: 2022-07-25 | End: 2022-07-29

## 2022-07-25 RX ADMIN — ESCITALOPRAM OXALATE 20 MILLIGRAM(S): 10 TABLET, FILM COATED ORAL at 08:16

## 2022-07-25 RX ADMIN — PANTOPRAZOLE SODIUM 40 MILLIGRAM(S): 20 TABLET, DELAYED RELEASE ORAL at 08:14

## 2022-07-25 RX ADMIN — Medication 650 MILLIGRAM(S): at 23:50

## 2022-07-25 RX ADMIN — Medication 1 MILLIGRAM(S): at 08:14

## 2022-07-25 RX ADMIN — Medication 0.5 MILLIGRAM(S): at 21:00

## 2022-07-25 NOTE — BH INPATIENT PSYCHIATRY PROGRESS NOTE - NSBHASSESSSUMMFT_PSY_ALL_CORE
31 year old man with no inpatient hospitalizations, no suicide attempts, living with wife and inlaws, difficulty holding jobs presents with suicidal ideation, anxious/panic symptoms after ongoing anxious episodes at work which lead him to remain in bed, helpless, hopeless, worthless and SI.  He has been treated as an outpatient for a few months and with lexapro 15mg.  Differential includes MDD, RICKIE, Panic disorder, performance anxiety and personality pathology (dependent? borderline?).      We increased lexapro to 20mg daily for anxiety and depression and are changing ativan from on emg daily to 0.5mg three times daily and adding propranolol 20mg three times daily for anxiety.  -lexapro, propranolol, ativan as above  -obtaining paper copy of dental consult  -start albuterol 2 puffs inhalation PRN Q 6 hours for SOB/wheezing   -PO/IM PRNs haldol, ativan, benadryl for agitation  -referred to psychology for individual therapy  -encourage milieu/group therapy

## 2022-07-25 NOTE — BH INPATIENT PSYCHIATRY PROGRESS NOTE - NSBHCHARTREVIEWVS_PSY_A_CORE FT
Vital Signs Last 24 Hrs  T(C): 36.8 (07-25-22 @ 08:28), Max: 36.8 (07-25-22 @ 08:28)  T(F): 98.2 (07-25-22 @ 08:28), Max: 98.2 (07-25-22 @ 08:28)  HR: --  BP: --  BP(mean): --  RR: --  SpO2: --    Orthostatic VS  07-25-22 @ 08:28  Lying BP: --/-- HR: --  Sitting BP: 121/71 HR: 74  Standing BP: --/-- HR: --  Site: --  Mode: --  Orthostatic VS  07-24-22 @ 08:21  Lying BP: --/-- HR: --  Sitting BP: 123/84 HR: 86  Standing BP: --/-- HR: --  Site: --  Mode: --

## 2022-07-26 PROCEDURE — 99232 SBSQ HOSP IP/OBS MODERATE 35: CPT

## 2022-07-26 RX ADMIN — Medication 650 MILLIGRAM(S): at 22:45

## 2022-07-26 RX ADMIN — ESCITALOPRAM OXALATE 20 MILLIGRAM(S): 10 TABLET, FILM COATED ORAL at 08:24

## 2022-07-26 RX ADMIN — Medication 0.5 MILLIGRAM(S): at 08:23

## 2022-07-26 RX ADMIN — Medication 0.5 MILLIGRAM(S): at 20:09

## 2022-07-26 RX ADMIN — PANTOPRAZOLE SODIUM 40 MILLIGRAM(S): 20 TABLET, DELAYED RELEASE ORAL at 08:24

## 2022-07-26 NOTE — BH INPATIENT PSYCHIATRY PROGRESS NOTE - NSBHCHARTREVIEWVS_PSY_A_CORE FT
Vital Signs Last 24 Hrs  T(C): 36.7 (07-26-22 @ 08:05), Max: 36.7 (07-26-22 @ 08:05)  T(F): 98.1 (07-26-22 @ 08:05), Max: 98.1 (07-26-22 @ 08:05)  HR: --  BP: --  BP(mean): --  RR: --  SpO2: --    Orthostatic VS  07-26-22 @ 08:05  Lying BP: --/-- HR: --  Sitting BP: 128/88 HR: 80  Standing BP: --/-- HR: --  Site: --  Mode: --  Orthostatic VS  07-25-22 @ 08:28  Lying BP: --/-- HR: --  Sitting BP: 121/71 HR: 74  Standing BP: --/-- HR: --  Site: --  Mode: --

## 2022-07-26 NOTE — BH INPATIENT PSYCHIATRY PROGRESS NOTE - NSBHASSESSSUMMFT_PSY_ALL_CORE
31 year old man with no inpatient hospitalizations, no suicide attempts, living with wife and inlaws, difficulty holding jobs presents with suicidal ideation, anxious/panic symptoms after ongoing anxious episodes at work which lead him to remain in bed, helpless, hopeless, worthless and SI.  He has been treated as an outpatient for a few months and with lexapro 15mg.  Differential includes MDD, RICKIE, Panic disorder, performance anxiety and personality pathology (dependent? borderline?).      We increased lexapro to 20mg daily for anxiety and depression and changed ativan from on emg daily to 0.5mg three times daily and added propranolol 20mg three times daily for anxiety and he woke the next day reporting improvement.  -lexapro, propranolol, ativan as above  -obtaining paper copy of dental consult  -start albuterol 2 puffs inhalation PRN Q 6 hours for SOB/wheezing   -PO/IM PRNs haldol, ativan, benadryl for agitation  -referred to psychology for individual therapy  -encourage milieu/group therapy

## 2022-07-27 PROCEDURE — 99232 SBSQ HOSP IP/OBS MODERATE 35: CPT

## 2022-07-27 RX ADMIN — Medication 0.5 MILLIGRAM(S): at 07:38

## 2022-07-27 RX ADMIN — ESCITALOPRAM OXALATE 20 MILLIGRAM(S): 10 TABLET, FILM COATED ORAL at 07:38

## 2022-07-27 RX ADMIN — Medication 650 MILLIGRAM(S): at 21:49

## 2022-07-27 RX ADMIN — Medication 0.5 MILLIGRAM(S): at 21:49

## 2022-07-27 RX ADMIN — Medication 25 MILLIGRAM(S): at 23:30

## 2022-07-27 RX ADMIN — Medication 0.5 MILLIGRAM(S): at 13:31

## 2022-07-27 RX ADMIN — PANTOPRAZOLE SODIUM 40 MILLIGRAM(S): 20 TABLET, DELAYED RELEASE ORAL at 07:36

## 2022-07-27 NOTE — BH INPATIENT PSYCHIATRY PROGRESS NOTE - NSBHASSESSSUMMFT_PSY_ALL_CORE
31 year old man with no inpatient hospitalizations, no suicide attempts, living with wife and inlaws, difficulty holding jobs presents with suicidal ideation, anxious/panic symptoms after ongoing anxious episodes at work which lead him to remain in bed, helpless, hopeless, worthless and SI.  He has been treated as an outpatient for a few months and with lexapro 15mg.  Differential includes MDD, RICKIE, Panic disorder, performance anxiety and personality pathology (dependent? borderline?).      We increased lexapro to 20mg daily for anxiety and depression and changed ativan from on emg daily to 0.5mg three times daily and added propranolol 20mg three times daily for anxiety and he woke the next day reporting improvement.  It seems to be sustaining itself.  If he maintains gains will be increasingly difficult to maintain that he is acutely dangerous to self  -lexapro, propranolol, ativan as above  -obtaining paper copy of dental consult  -start albuterol 2 puffs inhalation PRN Q 6 hours for SOB/wheezing   -PO/IM PRNs haldol, ativan, benadryl for agitation  -referred to psychology for individual therapy  -encourage milieu/group therapy

## 2022-07-27 NOTE — BH INPATIENT PSYCHIATRY PROGRESS NOTE - NSBHCHARTREVIEWVS_PSY_A_CORE FT
Vital Signs Last 24 Hrs  T(C): 36.6 (07-27-22 @ 08:41), Max: 36.6 (07-27-22 @ 08:41)  T(F): 97.8 (07-27-22 @ 08:41), Max: 97.8 (07-27-22 @ 08:41)  HR: 82 (07-27-22 @ 08:41) (82 - 82)  BP: 116/73 (07-27-22 @ 08:41) (116/73 - 116/73)  BP(mean): --  RR: --  SpO2: --    Orthostatic VS  07-26-22 @ 08:05  Lying BP: --/-- HR: --  Sitting BP: 128/88 HR: 80  Standing BP: --/-- HR: --  Site: --  Mode: --

## 2022-07-28 PROCEDURE — 90853 GROUP PSYCHOTHERAPY: CPT

## 2022-07-28 PROCEDURE — 99231 SBSQ HOSP IP/OBS SF/LOW 25: CPT

## 2022-07-28 RX ADMIN — ESCITALOPRAM OXALATE 20 MILLIGRAM(S): 10 TABLET, FILM COATED ORAL at 07:47

## 2022-07-28 RX ADMIN — PANTOPRAZOLE SODIUM 40 MILLIGRAM(S): 20 TABLET, DELAYED RELEASE ORAL at 07:47

## 2022-07-28 RX ADMIN — Medication 0.5 MILLIGRAM(S): at 13:32

## 2022-07-28 RX ADMIN — Medication 0.5 MILLIGRAM(S): at 07:47

## 2022-07-28 RX ADMIN — Medication 0.5 MILLIGRAM(S): at 20:27

## 2022-07-28 NOTE — BH TREATMENT PLAN - NSTXANXINTERPR_PSY_ALL_CORE
Over the next 7 days, Psychiatric rehabilitation staff will continue to provide encouragement, support, group an individual psychotherapy and psychoeducation to assist the patient in the progression of established treatment goal.

## 2022-07-28 NOTE — BH INPATIENT PSYCHIATRY PROGRESS NOTE - NSBHCHARTREVIEWVS_PSY_A_CORE FT
Vital Signs Last 24 Hrs  T(C): 36.6 (07-28-22 @ 08:51), Max: 36.6 (07-28-22 @ 08:51)  T(F): 97.9 (07-28-22 @ 08:51), Max: 97.9 (07-28-22 @ 08:51)  HR: --  BP: --  BP(mean): --  RR: --  SpO2: --    Orthostatic VS  07-28-22 @ 08:51  Lying BP: --/-- HR: --  Sitting BP: 117/70 HR: 69  Standing BP: --/-- HR: --  Site: --  Mode: --  Orthostatic VS  07-27-22 @ 21:53  Lying BP: --/-- HR: --  Sitting BP: --/-- HR: --  Standing BP: 126/70 HR: 75  Site: --  Mode: --

## 2022-07-29 VITALS — TEMPERATURE: 98 F

## 2022-07-29 PROCEDURE — 90853 GROUP PSYCHOTHERAPY: CPT

## 2022-07-29 PROCEDURE — 99233 SBSQ HOSP IP/OBS HIGH 50: CPT

## 2022-07-29 RX ORDER — PROPRANOLOL HCL 160 MG
1 CAPSULE, EXTENDED RELEASE 24HR ORAL
Qty: 90 | Refills: 0
Start: 2022-07-29 | End: 2022-08-27

## 2022-07-29 RX ORDER — ALBUTEROL 90 UG/1
2 AEROSOL, METERED ORAL
Qty: 1 | Refills: 0
Start: 2022-07-29

## 2022-07-29 RX ORDER — ESCITALOPRAM OXALATE 10 MG/1
1 TABLET, FILM COATED ORAL
Qty: 30 | Refills: 0
Start: 2022-07-29 | End: 2022-08-27

## 2022-07-29 RX ORDER — PANTOPRAZOLE SODIUM 20 MG/1
1 TABLET, DELAYED RELEASE ORAL
Qty: 30 | Refills: 0
Start: 2022-07-29 | End: 2022-08-27

## 2022-07-29 RX ADMIN — Medication 0.5 MILLIGRAM(S): at 07:41

## 2022-07-29 RX ADMIN — Medication 0.5 MILLIGRAM(S): at 12:09

## 2022-07-29 RX ADMIN — ESCITALOPRAM OXALATE 20 MILLIGRAM(S): 10 TABLET, FILM COATED ORAL at 07:41

## 2022-07-29 RX ADMIN — PANTOPRAZOLE SODIUM 40 MILLIGRAM(S): 20 TABLET, DELAYED RELEASE ORAL at 07:41

## 2022-07-29 NOTE — BH INPATIENT PSYCHIATRY PROGRESS NOTE - NSICDXBHSECONDARYDX_PSY_ALL_CORE
Panic attacks   F41.0  

## 2022-07-29 NOTE — BH INPATIENT PSYCHIATRY PROGRESS NOTE - NSBHATTESTBILLINGAW_PSY_A_CORE
[id number] Inpatient care - moderate complexity - 25 minutes
[id number]-Ljzqepamlh Inpatient care - moderate complexity - 25 minutes
[id number]-Ywxxeegmaz Inpatient care - high complexity - 35 minutes
[id number]-Ydtlstpmju Inpatient care - low complexity - 15 minutes
[id number]-Togaqzimsh Inpatient care - moderate complexity - 25 minutes
[id number]-Sscppejdqs Inpatient care - moderate complexity - 25 minutes
[id number]-Lqarenqggd Inpatient care - moderate complexity - 25 minutes
[id number]-Jwfdvleowk Inpatient care - moderate complexity - 25 minutes

## 2022-07-29 NOTE — BH PSYCHOLOGY - GROUP THERAPY NOTE - NSPSYCHOLGRPGENPT_PSY_A_CORE FT
Patient attended the cognitive behavior therapy (CBT) group session.  Group focused on the topic of motivation and learning how to develop the commitment to change their current circumstances.  Patient was encouraged to list difficulties as well as their strengths in dealing with their problems.  Patient was also encouraged to do a cost benefit analysis of changing current situations.  Group expectations and guidelines (as well as confidentiality and its limitations) were addressed.  Principles of cognitive behavior therapy related to today's group topic were reviewed.  Group members illustrated understanding of these concepts by giving personal examples based on their current experiences.  Discussions stemmed from the group topics to the causal connection between thoughts, feelings, and behaviors.
Patient attended the cognitive behavior therapy group session. Group session focused on the topic of problem solving.  Discussion revolved around the identification of problems, exploring the maximization of benefits while reducing the cost of negative consequences as well as tangible strategies to solving problems.  Group expectations and guidelines, as well as confidentiality and its limitations, were addressed. Principles of cognitive behavior therapy related to today's group topic were reviewed and discussed. Group members illustrated understanding of these concepts by giving personal examples based on their current experiences. Discussions stemmed from the group topics to the causal connection between thoughts, feelings, and behaviors.
Patient attended the psychology cognitive-behavior therapy group. The discussion was focused on the topic of Tolerating Uncertainty. Group expectations, guidelines, confidentiality and its limitations were reviewed. The group began with defining intolerance of uncertainty. The concept of challenging the need for certainty and learning how to deliberately do uncertain things in order to acclimate to uncertainty was explained. The acronym APPLE was used as a strategy to tolerate uncertainty: Acknowledge, Pause, Pull Back, Let Go, and Explore. Group members demonstrated their understanding through active participation, discussion, and by asking clarifying questions. Discussion stemmed from the group's focus on the connection between thoughts, feelings and behaviors. Group members were provided with a handout describing the concepts and strategies discussed.
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
## 2022-07-29 NOTE — BH INPATIENT PSYCHIATRY DISCHARGE NOTE - NSBHDCHANDOFFVIA_PSY_ALL_CORE
Sees Cecilia Rosario NP in psychiatry. Taking olanzapine 7.5mg at bedtime.  Past meds: abilify and latuda gave her insomnia; lamictal caused hair to fall out;  geodon made her feel numb; seroquel made her manic  Carbamazepine caused serious weight gain  Lithium, not sure why she stopped this one  Consider changing to Saphris, Vraylar or lithium. Pt agrees to discuss with psychiatry provider.    Reports mild ongoing depression.  Denies suicidal thoughts.  Reports last manic episode Jan 2020.   Verbal Email

## 2022-07-29 NOTE — BH INPATIENT PSYCHIATRY PROGRESS NOTE - NSCGIIMPROVESX_PSY_ALL_CORE
2 = Much improved - notably better with signficant reduction of symptoms; increase in the level of functioning but some symptoms remain
3 = Minimally improved - slightly better with little or no clinically meaningful reduction of symptoms.  Represents very little change in basic clinical status, level of care, or functional capacity.
3 = Minimally improved - slightly better with little or no clinically meaningful reduction of symptoms.  Represents very little change in basic clinical status, level of care, or functional capacity.
2 = Much improved - notably better with signficant reduction of symptoms; increase in the level of functioning but some symptoms remain
3 = Minimally improved - slightly better with little or no clinically meaningful reduction of symptoms.  Represents very little change in basic clinical status, level of care, or functional capacity.
2 = Much improved - notably better with signficant reduction of symptoms; increase in the level of functioning but some symptoms remain

## 2022-07-29 NOTE — BH INPATIENT PSYCHIATRY PROGRESS NOTE - NSBHMETABOLIC_PSY_ALL_CORE_FT
BMI: BMI (kg/m2): 35.1 (07-21-22 @ 14:23)  HbA1c: A1C with Estimated Average Glucose Result: 5.1 % (07-22-22 @ 08:45)    Glucose: POCT Blood Glucose.: 101 mg/dL (12-15-21 @ 00:29)    BP: 124/80 (07-21-22 @ 14:25) (114/87 - 124/80)  Lipid Panel: Date/Time: 07-22-22 @ 08:45  Cholesterol, Serum: 192  Direct LDL: --  HDL Cholesterol, Serum: 32  Total Cholesterol/HDL Ration Measurement: --  Triglycerides, Serum: 63  
BMI: BMI (kg/m2): 35.1 (07-21-22 @ 14:23)  HbA1c: A1C with Estimated Average Glucose Result: 5.1 % (07-22-22 @ 08:45)    Glucose: POCT Blood Glucose.: 101 mg/dL (12-15-21 @ 00:29)    BP: 128/84 (07-23-22 @ 19:54) (114/87 - 128/84)  Lipid Panel: Date/Time: 07-22-22 @ 08:45  Cholesterol, Serum: 192  Direct LDL: --  HDL Cholesterol, Serum: 32  Total Cholesterol/HDL Ration Measurement: --  Triglycerides, Serum: 63  
BMI: BMI (kg/m2): 35.1 (07-21-22 @ 14:23)  HbA1c: A1C with Estimated Average Glucose Result: 5.1 % (07-22-22 @ 08:45)    Glucose: POCT Blood Glucose.: 101 mg/dL (12-15-21 @ 00:29)    BP: 116/73 (07-27-22 @ 08:41) (116/73 - 116/73)  Lipid Panel: Date/Time: 07-22-22 @ 08:45  Cholesterol, Serum: 192  Direct LDL: --  HDL Cholesterol, Serum: 32  Total Cholesterol/HDL Ration Measurement: --  Triglycerides, Serum: 63  
BMI: BMI (kg/m2): 35.1 (07-21-22 @ 14:23)  HbA1c: A1C with Estimated Average Glucose Result: 5.1 % (07-22-22 @ 08:45)    Glucose: POCT Blood Glucose.: 101 mg/dL (12-15-21 @ 00:29)    BP: 128/84 (07-23-22 @ 19:54) (128/84 - 128/84)  Lipid Panel: Date/Time: 07-22-22 @ 08:45  Cholesterol, Serum: 192  Direct LDL: --  HDL Cholesterol, Serum: 32  Total Cholesterol/HDL Ration Measurement: --  Triglycerides, Serum: 63  
BMI: BMI (kg/m2): 35.1 (07-21-22 @ 14:23)  HbA1c: A1C with Estimated Average Glucose Result: 5.1 % (07-22-22 @ 08:45)    Glucose: POCT Blood Glucose.: 101 mg/dL (12-15-21 @ 00:29)    BP: 124/80 (07-21-22 @ 14:25) (114/87 - 124/80)  Lipid Panel: Date/Time: 07-22-22 @ 08:45  Cholesterol, Serum: 192  Direct LDL: --  HDL Cholesterol, Serum: 32  Total Cholesterol/HDL Ration Measurement: --  Triglycerides, Serum: 63  
BMI: BMI (kg/m2): 35.1 (07-21-22 @ 14:23)  HbA1c: A1C with Estimated Average Glucose Result: 5.1 % (07-22-22 @ 08:45)    Glucose: POCT Blood Glucose.: 101 mg/dL (12-15-21 @ 00:29)    BP: 116/73 (07-27-22 @ 08:41) (116/73 - 116/73)  Lipid Panel: Date/Time: 07-22-22 @ 08:45  Cholesterol, Serum: 192  Direct LDL: --  HDL Cholesterol, Serum: 32  Total Cholesterol/HDL Ration Measurement: --  Triglycerides, Serum: 63  
BMI: BMI (kg/m2): 35.1 (07-21-22 @ 14:23)  HbA1c: A1C with Estimated Average Glucose Result: 5.1 % (07-22-22 @ 08:45)    Glucose: POCT Blood Glucose.: 101 mg/dL (12-15-21 @ 00:29)    BP: 128/84 (07-23-22 @ 19:54) (128/84 - 128/84)  Lipid Panel: Date/Time: 07-22-22 @ 08:45  Cholesterol, Serum: 192  Direct LDL: --  HDL Cholesterol, Serum: 32  Total Cholesterol/HDL Ration Measurement: --  Triglycerides, Serum: 63  
BMI: BMI (kg/m2): 35.1 (07-21-22 @ 14:23)  HbA1c: A1C with Estimated Average Glucose Result: 5.1 % (07-22-22 @ 08:45)    Glucose: POCT Blood Glucose.: 101 mg/dL (12-15-21 @ 00:29)    BP: 116/73 (07-27-22 @ 08:41) (116/73 - 116/73)  Lipid Panel: Date/Time: 07-22-22 @ 08:45  Cholesterol, Serum: 192  Direct LDL: --  HDL Cholesterol, Serum: 32  Total Cholesterol/HDL Ration Measurement: --  Triglycerides, Serum: 63
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
## 2022-07-29 NOTE — BH PSYCHOLOGY - GROUP THERAPY NOTE - TOKEN PULL-DIAGNOSIS
Primary Diagnosis:  MDD (major depressive disorder) [F32.9]      Anxiety [F41.9]        Problem Dx:   Panic attacks [F41.0]      

## 2022-07-29 NOTE — BH DISCHARGE NOTE NURSING/SOCIAL WORK/PSYCH REHAB - DISCHARGE INSTRUCTIONS AFTERCARE APPOINTMENTS
In order to check the location, date, or time of your aftercare appointment, please refer to your Discharge Instructions Document given to you upon leaving the hospital.  If you have lost the instructions please call [phone]

## 2022-07-29 NOTE — BH DISCHARGE NOTE NURSING/SOCIAL WORK/PSYCH REHAB - NSCDUDCCRISIS_PSY_A_CORE
[facility]  1 [phone]) [facility]-WELL ([phone])  Text "WELL" to [phone]  Website: www.[employer]/.[employer] 1 (571) 511-IXBG (4704) Website: www.safehorizon.org/.National Suicide Prevention Lifeline 4 (973) 271-0853/.  [employer]  1 (226) LIF[employer] (018-5794)/.  [facility]  [phone] [place] [address] [phone]   Hours:  Monday through Friday from 9 AM to 3 PM/.  [employer] - [facility] Crisis Line  [phone], Option 1

## 2022-07-29 NOTE — BH INPATIENT PSYCHIATRY PROGRESS NOTE - CURRENT MEDICATION
MEDICATIONS  (STANDING):  escitalopram 20 milliGRAM(s) Oral daily  LORazepam     Tablet 0.5 milliGRAM(s) Oral three times a day  pantoprazole    Tablet 40 milliGRAM(s) Oral before breakfast  propranolol 20 milliGRAM(s) Oral three times a day    MEDICATIONS  (PRN):  ALBUTerol    90 MICROgram(s) HFA Inhaler 2 Puff(s) Inhalation every 6 hours PRN asthma  diphenhydrAMINE 50 milliGRAM(s) Oral every 6 hours PRN agitation  diphenhydrAMINE Injectable 50 milliGRAM(s) IntraMuscular once PRN agitation  haloperidol     Tablet 5 milliGRAM(s) Oral every 6 hours PRN agitation  haloperidol    Injectable 5 milliGRAM(s) IntraMuscular once PRN agitation  hydrOXYzine hydrochloride 25 milliGRAM(s) Oral every 6 hours PRN anxiety  LORazepam     Tablet 2 milliGRAM(s) Oral every 6 hours PRN agitation  
MEDICATIONS  (STANDING):  escitalopram 20 milliGRAM(s) Oral daily  LORazepam     Tablet 0.5 milliGRAM(s) Oral three times a day  pantoprazole    Tablet 40 milliGRAM(s) Oral before breakfast  propranolol 20 milliGRAM(s) Oral three times a day    MEDICATIONS  (PRN):  acetaminophen     Tablet .. 650 milliGRAM(s) Oral every 6 hours PRN Mild Pain (1 - 3)  ALBUTerol    90 MICROgram(s) HFA Inhaler 2 Puff(s) Inhalation every 6 hours PRN asthma  diphenhydrAMINE 50 milliGRAM(s) Oral every 6 hours PRN agitation  diphenhydrAMINE Injectable 50 milliGRAM(s) IntraMuscular once PRN agitation  haloperidol     Tablet 5 milliGRAM(s) Oral every 6 hours PRN agitation  haloperidol    Injectable 5 milliGRAM(s) IntraMuscular once PRN agitation  hydrOXYzine hydrochloride 25 milliGRAM(s) Oral every 6 hours PRN anxiety  LORazepam     Tablet 2 milliGRAM(s) Oral every 6 hours PRN agitation  
MEDICATIONS  (STANDING):  escitalopram 20 milliGRAM(s) Oral daily  LORazepam     Tablet 0.5 milliGRAM(s) Oral three times a day  pantoprazole    Tablet 40 milliGRAM(s) Oral before breakfast  propranolol 20 milliGRAM(s) Oral three times a day    MEDICATIONS  (PRN):  acetaminophen     Tablet .. 650 milliGRAM(s) Oral every 6 hours PRN Mild Pain (1 - 3)  ALBUTerol    90 MICROgram(s) HFA Inhaler 2 Puff(s) Inhalation every 6 hours PRN asthma  diphenhydrAMINE 50 milliGRAM(s) Oral every 6 hours PRN agitation  diphenhydrAMINE Injectable 50 milliGRAM(s) IntraMuscular once PRN agitation  haloperidol     Tablet 5 milliGRAM(s) Oral every 6 hours PRN agitation  haloperidol    Injectable 5 milliGRAM(s) IntraMuscular once PRN agitation  hydrOXYzine hydrochloride 25 milliGRAM(s) Oral every 6 hours PRN anxiety  LORazepam     Tablet 2 milliGRAM(s) Oral every 6 hours PRN agitation  
MEDICATIONS  (STANDING):  escitalopram 20 milliGRAM(s) Oral daily  LORazepam     Tablet 1 milliGRAM(s) Oral daily  pantoprazole    Tablet 40 milliGRAM(s) Oral before breakfast    MEDICATIONS  (PRN):  ALBUTerol    90 MICROgram(s) HFA Inhaler 2 Puff(s) Inhalation every 6 hours PRN asthma  diphenhydrAMINE 50 milliGRAM(s) Oral every 6 hours PRN agitation  diphenhydrAMINE Injectable 50 milliGRAM(s) IntraMuscular once PRN agitation  haloperidol     Tablet 5 milliGRAM(s) Oral every 6 hours PRN agitation  haloperidol    Injectable 5 milliGRAM(s) IntraMuscular once PRN agitation  hydrOXYzine hydrochloride 25 milliGRAM(s) Oral every 6 hours PRN anxiety  LORazepam     Tablet 2 milliGRAM(s) Oral every 6 hours PRN agitation  
MEDICATIONS  (STANDING):  escitalopram 20 milliGRAM(s) Oral daily  LORazepam     Tablet 0.5 milliGRAM(s) Oral three times a day  pantoprazole    Tablet 40 milliGRAM(s) Oral before breakfast  propranolol 20 milliGRAM(s) Oral three times a day    MEDICATIONS  (PRN):  acetaminophen     Tablet .. 650 milliGRAM(s) Oral every 6 hours PRN Mild Pain (1 - 3)  ALBUTerol    90 MICROgram(s) HFA Inhaler 2 Puff(s) Inhalation every 6 hours PRN asthma  diphenhydrAMINE 50 milliGRAM(s) Oral every 6 hours PRN agitation  diphenhydrAMINE Injectable 50 milliGRAM(s) IntraMuscular once PRN agitation  haloperidol     Tablet 5 milliGRAM(s) Oral every 6 hours PRN agitation  haloperidol    Injectable 5 milliGRAM(s) IntraMuscular once PRN agitation  hydrOXYzine hydrochloride 25 milliGRAM(s) Oral every 6 hours PRN anxiety  LORazepam     Tablet 2 milliGRAM(s) Oral every 6 hours PRN agitation  
MEDICATIONS  (STANDING):  escitalopram 20 milliGRAM(s) Oral daily  LORazepam     Tablet 1 milliGRAM(s) Oral daily  pantoprazole    Tablet 40 milliGRAM(s) Oral before breakfast    MEDICATIONS  (PRN):  ALBUTerol    90 MICROgram(s) HFA Inhaler 2 Puff(s) Inhalation every 6 hours PRN asthma  diphenhydrAMINE 50 milliGRAM(s) Oral every 6 hours PRN agitation  diphenhydrAMINE Injectable 50 milliGRAM(s) IntraMuscular once PRN agitation  haloperidol     Tablet 5 milliGRAM(s) Oral every 6 hours PRN agitation  haloperidol    Injectable 5 milliGRAM(s) IntraMuscular once PRN agitation  hydrOXYzine hydrochloride 25 milliGRAM(s) Oral every 6 hours PRN anxiety  LORazepam     Tablet 2 milliGRAM(s) Oral every 6 hours PRN agitation  
MEDICATIONS  (STANDING):  escitalopram 20 milliGRAM(s) Oral daily  LORazepam     Tablet 1 milliGRAM(s) Oral daily  pantoprazole    Tablet 40 milliGRAM(s) Oral before breakfast    MEDICATIONS  (PRN):  ALBUTerol    90 MICROgram(s) HFA Inhaler 2 Puff(s) Inhalation every 6 hours PRN asthma  diphenhydrAMINE 50 milliGRAM(s) Oral every 6 hours PRN agitation  diphenhydrAMINE Injectable 50 milliGRAM(s) IntraMuscular once PRN agitation  haloperidol     Tablet 5 milliGRAM(s) Oral every 6 hours PRN agitation  haloperidol    Injectable 5 milliGRAM(s) IntraMuscular once PRN agitation  hydrOXYzine hydrochloride 25 milliGRAM(s) Oral every 6 hours PRN anxiety  LORazepam     Tablet 2 milliGRAM(s) Oral every 6 hours PRN agitation  
MEDICATIONS  (STANDING):  escitalopram 20 milliGRAM(s) Oral daily  LORazepam     Tablet 0.5 milliGRAM(s) Oral three times a day  pantoprazole    Tablet 40 milliGRAM(s) Oral before breakfast  propranolol 20 milliGRAM(s) Oral three times a day    MEDICATIONS  (PRN):  acetaminophen     Tablet .. 650 milliGRAM(s) Oral every 6 hours PRN Mild Pain (1 - 3)  ALBUTerol    90 MICROgram(s) HFA Inhaler 2 Puff(s) Inhalation every 6 hours PRN asthma  diphenhydrAMINE 50 milliGRAM(s) Oral every 6 hours PRN agitation  diphenhydrAMINE Injectable 50 milliGRAM(s) IntraMuscular once PRN agitation  haloperidol     Tablet 5 milliGRAM(s) Oral every 6 hours PRN agitation  haloperidol    Injectable 5 milliGRAM(s) IntraMuscular once PRN agitation  hydrOXYzine hydrochloride 25 milliGRAM(s) Oral every 6 hours PRN anxiety  LORazepam     Tablet 2 milliGRAM(s) Oral every 6 hours PRN agitation

## 2022-07-29 NOTE — BH INPATIENT PSYCHIATRY PROGRESS NOTE - NSCGISEVERILLNESS_PSY_ALL_CORE
Chief complaint:   Chief Complaint   Patient presents with   • Cough     Cough/congestion today/fever off and on x 3 days. OTC-tylelnol at 1530 today. Mom-Adonay and Dad-Harrison here.        Vitals:  Visit Vitals  Pulse 122   Temp 97.6 °F (36.4 °C) (Rectal)   Resp 28   Wt 10.9 kg Comment: 24#   SpO2 95%       HISTORY OF PRESENT ILLNESS     HPI     This is an 11-month-old toddler presenting with her parents, in order to discuss a 1 day history of fever. [place], visited the family with children in [place] where 2 kids were sick. The patient had a measured temperature of 102 rectal. She is occasionally refusing a bottle. She has no history of ear infections. She did nurse when she was an infant. She has never had infection. There was no difficulty during air travel. She is teething consistently.    Other significant problems:  There are no active problems to display for this patient.      PAST MEDICAL, FAMILY AND SOCIAL HISTORY     Medications:  No current outpatient prescriptions on file.     No current facility-administered medications for this visit.        Allergies:  ALLERGIES:  No Known Allergies    Past Medical  History/Surgeries:  History reviewed. No pertinent past medical history.    History reviewed. No pertinent surgical history.    Family History:  History reviewed. No pertinent family history.    Social History:  Social History   Substance Use Topics   • Smoking status: Not on file   • Smokeless tobacco: Not on file   • Alcohol use Not on file       REVIEW OF SYSTEMS     Review of Systems   Constitutional: Positive for fever and irritability.   HENT: Positive for drooling.    All other systems reviewed and are negative.      PHYSICAL EXAM     Physical Exam   Constitutional: She appears well-developed and well-nourished. She is active. She has a strong cry.   This is a very well-appearing toddler, no distress. She is well-hydrated, has no rashes. She does 
cry during examination   HENT:   Right Ear: Tympanic membrane normal.   Left Ear: Tympanic membrane normal.   Nose: No nasal discharge.   Mouth/Throat: Mucous membranes are moist. Oropharynx is clear. Pharynx is normal.   Eyes: Conjunctivae and EOM are normal. Pupils are equal, round, and reactive to light. Right eye exhibits no discharge. Left eye exhibits no discharge.   Neck: Normal range of motion. Neck supple.   Cardiovascular: Normal rate and regular rhythm.    Pulmonary/Chest: Effort normal and breath sounds normal. No nasal flaring or stridor. No respiratory distress. She has no wheezes. She has no rhonchi. She has no rales. She exhibits no retraction.   Abdominal: Soft. She exhibits no distension and no mass. There is no hepatosplenomegaly. There is no tenderness.   Lymphadenopathy: No occipital adenopathy is present.     She has no cervical adenopathy.   Neurological: She is alert.   Nursing note and vitals reviewed.      ASSESSMENT/PLAN     Impression   1. Fever, consistent with teething    Plan  1. Ibuprofen, may alternate with Tylenol as needed  2. Hydration discussed.  3. Follow-up with any ongoing symptoms, development of lethargy, persistently decreased appetite or other concerns        
4 = Moderately ill – overt symptoms causing noticeable, but modest, functional impairment or distress; symptom level may warrant medication
5 = Markedly ill - intrusive symptoms that distinctly impair social/occupational function or cause intrusive levels of distress
4 = Moderately ill – overt symptoms causing noticeable, but modest, functional impairment or distress; symptom level may warrant medication
5 = Markedly ill - intrusive symptoms that distinctly impair social/occupational function or cause intrusive levels of distress
5 = Markedly ill - intrusive symptoms that distinctly impair social/occupational function or cause intrusive levels of distress
4 = Moderately ill – overt symptoms causing noticeable, but modest, functional impairment or distress; symptom level may warrant medication

## 2022-07-29 NOTE — BH PSYCHOLOGY - GROUP THERAPY NOTE - NSBHPSYCHOLPARTICIPCOMMENT_PSY_A_CORE FT
Patient appeared attentive and interested in the group discussion.  Although the patient did not contribute spontaneously during the group session, patient was able to read from the worksheet when prompted. Patient was able to engage with the  and other members appropriately.
Patient appeared attentive and interested in the group discussion.  Although the patient did not contribute spontaneously during the group session, patient was able to read from the worksheet when prompted. Patient was able to engage with the  and other members appropriately.
Patient was able to participate and engage with the other group members appropriately.  Patient appeared attentive and contributed to the group discussion. Patient was able to share a personal experience related to the topic that was well-received by the fellow group members.

## 2022-07-29 NOTE — BH INPATIENT PSYCHIATRY PROGRESS NOTE - NSBHATTESTTYPEVISIT_PSY_A_CORE
Attending Only

## 2022-07-29 NOTE — BH INPATIENT PSYCHIATRY PROGRESS NOTE - NSBHFUPINTERVALHXFT_PSY_A_CORE
Patient seen for follow up of SI, depression and anxiety.  Chart, medications and labs reviewed, no acute findings.  Patient is discussed with nursing staff. No significant overnight issues.  Patient remains compliant with all standing medication no SE reported or observed.  No prn in past 24hrs. Patient reports eating and sleeping well.    Patient reports feeling “good, not so anxious this morning”  Reports improved mood, feels less depressed, manageable level of anxiety. Reports Lexapro is working.  Denies SI/SIB/HI, no plan or intent.   No overt psychotic trend. Denies AVH at this time. Patient offers no complaints. No behavioral concerns. No acute medical concerns, patient went to dental appointment yesterday (no recommendations from dental) VSS. Continue to provide therapeutic support.   
Vivian report no intervalevents, compliant with meds, no prns.  He reports feeling somewhat "better" but somewhat depressed and anxious.  He relates anxiety about social interactions, general anxiety and performance anxiety.  We discussed we just increased lexapro and will continue for now.  We discusesd and he is familiar with propranolol and will give 20mg three times daily for performance anxiety and likely  traumatic history.  We also discussed rationalizing his ativan from one mg in the morning to 0.5mg three times daily.    
Staff report no interval events, awake throughout night because of noisy roommate.  Changed roommate.  He describes leaving school in fourth grade, home school returning for high school.  He reports he was unstructured and more or less to himself throughout those years.  He graduated went to college, degree of nutrition.  He reports similar debilitating symptoms (anxiety, panic attacks) throughout.  Has never bene on ore than lexapro 15.  Discussed higher dose of ssri effective for anxiety.  Will increase to 20mg.
Patient seen for follow up of SI, depression and anxiety.  Chart, medications and labs reviewed, no acute findings.  Patient is discussed with nursing staff. No significant overnight issues.  Patient remains compliant with all standing medication no SE reported or observed.  No prn in past 24hrs. Patient reports eating and sleeping well.    Patient reports feeling “anxious” Some incremental changes as of today.  Reports slight alleviation in mood, feels less depressed, manageable level of anxiety. Reports Lexapro is working.  Denies SI/SIB/HI, no plan or intent.  He is seen more on the unit, increase socialization with peers. No overt psychotic trend. Denies AVH at this time. Patient offers no complaints. No behavioral concerns. No acute medical concerns, patient went to dental appointment yesterday (no recommendations from dental) VSS. Continue to provide therapeutic support.   
Staff report no interval events, compliant with meds.  No prns.  Slept.  Continues to be visible, participate in group and activities and interacts with peers.  He continues to report feeling much better, less anxious, less depressed and denies si/hi intents or plans.  He is future oriented and remarks he feels comfortable with discharge and outpatient follow up.  He will be discharged with a bridging apppointment at [facility] in clinic and then [facility] intake on 8/9.  He is not an acute danger to himself or others.    
Staff report no interval events, compliant with meds, no prns except tylenol.  Attends groups and acitvities, socializing with peers, reports "I think it is working" and relates waking up and going through the morning with much reduced anxiety.  reports good conversation with wife.  Provided educaiton and encouragement.  Will observe to see if improvement lasts.    
Staff report no interval events, compliant with meds.  Hydroxyzine prn last night.   Continues to attend group, participate, etc.  He reports he is feeling better, comfortable with current regimen and that it is effective.  Social work engaged in discharge planning.  
Staff report no interval events, compliant with meds, no prns except tylenol. Continues to attend group, participate, etc.  He reports he is feeling better, comfortable with current regimen and that it is effective.  Social work engaged in discharge planning.

## 2022-07-29 NOTE — BH INPATIENT PSYCHIATRY DISCHARGE NOTE - DESCRIPTION
, domiciled with wife, sister and brother-in-law, father and MIL, currently employed FT at [employer], no children, dx with social anxiety d/o diagnosed around age 7/8

## 2022-07-29 NOTE — BH INPATIENT PSYCHIATRY PROGRESS NOTE - NSBHFUPINTERVALCCFT_PSY_A_CORE
Patient seen, chart reviewed, case discussed with team.  Patient seen for follow up of suicidal ideation, depression and anxiety.
Patient seen, chart reviewed, case discussed with team.  Patient seen for follow up of suicidal ideation, depression and anxiety.
Patient seen for follow up of suicidal ideation, depression and anxiety.
Patient seen, chart reviewed, case discussed with team.  Patient seen for follow up of suicidal ideation, depression and anxiety.
Patient seen for follow up of suicidal ideation, depression and anxiety.
Patient seen, chart reviewed, case discussed with team.  Patient seen for discharge day management of depression, anxiety and suicidal ideation.
Patient seen, chart reviewed, case discussed with team.  Patient seen for follow up of suicidal ideation, depression and anxiety.
Patient seen, chart reviewed, case discussed with team.  Patient seen for follow up of suicidal ideation, depression and anxiety.

## 2022-07-29 NOTE — BH PSYCHOLOGY - GROUP THERAPY NOTE - NSPSYCHOLGRPGENPROB_PSY_A_CORE
academic/vocational/social dysfunction/anxiety/depression

## 2022-07-29 NOTE — BH INPATIENT PSYCHIATRY PROGRESS NOTE - PRN MEDS
MEDICATIONS  (PRN):  ALBUTerol    90 MICROgram(s) HFA Inhaler 2 Puff(s) Inhalation every 6 hours PRN asthma  diphenhydrAMINE 50 milliGRAM(s) Oral every 6 hours PRN agitation  diphenhydrAMINE Injectable 50 milliGRAM(s) IntraMuscular once PRN agitation  haloperidol     Tablet 5 milliGRAM(s) Oral every 6 hours PRN agitation  haloperidol    Injectable 5 milliGRAM(s) IntraMuscular once PRN agitation  hydrOXYzine hydrochloride 25 milliGRAM(s) Oral every 6 hours PRN anxiety  LORazepam     Tablet 2 milliGRAM(s) Oral every 6 hours PRN agitation  
MEDICATIONS  (PRN):  ALBUTerol    90 MICROgram(s) HFA Inhaler 2 Puff(s) Inhalation every 6 hours PRN asthma  diphenhydrAMINE 50 milliGRAM(s) Oral every 6 hours PRN agitation  diphenhydrAMINE Injectable 50 milliGRAM(s) IntraMuscular once PRN agitation  haloperidol     Tablet 5 milliGRAM(s) Oral every 6 hours PRN agitation  haloperidol    Injectable 5 milliGRAM(s) IntraMuscular once PRN agitation  hydrOXYzine hydrochloride 25 milliGRAM(s) Oral every 6 hours PRN anxiety  LORazepam     Tablet 2 milliGRAM(s) Oral every 6 hours PRN agitation  
MEDICATIONS  (PRN):  acetaminophen     Tablet .. 650 milliGRAM(s) Oral every 6 hours PRN Mild Pain (1 - 3)  ALBUTerol    90 MICROgram(s) HFA Inhaler 2 Puff(s) Inhalation every 6 hours PRN asthma  diphenhydrAMINE 50 milliGRAM(s) Oral every 6 hours PRN agitation  diphenhydrAMINE Injectable 50 milliGRAM(s) IntraMuscular once PRN agitation  haloperidol     Tablet 5 milliGRAM(s) Oral every 6 hours PRN agitation  haloperidol    Injectable 5 milliGRAM(s) IntraMuscular once PRN agitation  hydrOXYzine hydrochloride 25 milliGRAM(s) Oral every 6 hours PRN anxiety  LORazepam     Tablet 2 milliGRAM(s) Oral every 6 hours PRN agitation  
MEDICATIONS  (PRN):  ALBUTerol    90 MICROgram(s) HFA Inhaler 2 Puff(s) Inhalation every 6 hours PRN asthma  diphenhydrAMINE 50 milliGRAM(s) Oral every 6 hours PRN agitation  diphenhydrAMINE Injectable 50 milliGRAM(s) IntraMuscular once PRN agitation  haloperidol     Tablet 5 milliGRAM(s) Oral every 6 hours PRN agitation  haloperidol    Injectable 5 milliGRAM(s) IntraMuscular once PRN agitation  hydrOXYzine hydrochloride 25 milliGRAM(s) Oral every 6 hours PRN anxiety  LORazepam     Tablet 2 milliGRAM(s) Oral every 6 hours PRN agitation  
MEDICATIONS  (PRN):  acetaminophen     Tablet .. 650 milliGRAM(s) Oral every 6 hours PRN Mild Pain (1 - 3)  ALBUTerol    90 MICROgram(s) HFA Inhaler 2 Puff(s) Inhalation every 6 hours PRN asthma  diphenhydrAMINE 50 milliGRAM(s) Oral every 6 hours PRN agitation  diphenhydrAMINE Injectable 50 milliGRAM(s) IntraMuscular once PRN agitation  haloperidol     Tablet 5 milliGRAM(s) Oral every 6 hours PRN agitation  haloperidol    Injectable 5 milliGRAM(s) IntraMuscular once PRN agitation  hydrOXYzine hydrochloride 25 milliGRAM(s) Oral every 6 hours PRN anxiety  LORazepam     Tablet 2 milliGRAM(s) Oral every 6 hours PRN agitation  
MEDICATIONS  (PRN):  acetaminophen     Tablet .. 650 milliGRAM(s) Oral every 6 hours PRN Mild Pain (1 - 3)  ALBUTerol    90 MICROgram(s) HFA Inhaler 2 Puff(s) Inhalation every 6 hours PRN asthma  diphenhydrAMINE 50 milliGRAM(s) Oral every 6 hours PRN agitation  diphenhydrAMINE Injectable 50 milliGRAM(s) IntraMuscular once PRN agitation  haloperidol     Tablet 5 milliGRAM(s) Oral every 6 hours PRN agitation  haloperidol    Injectable 5 milliGRAM(s) IntraMuscular once PRN agitation  hydrOXYzine hydrochloride 25 milliGRAM(s) Oral every 6 hours PRN anxiety  LORazepam     Tablet 2 milliGRAM(s) Oral every 6 hours PRN agitation  
MEDICATIONS  (PRN):  ALBUTerol    90 MICROgram(s) HFA Inhaler 2 Puff(s) Inhalation every 6 hours PRN asthma  diphenhydrAMINE 50 milliGRAM(s) Oral every 6 hours PRN agitation  diphenhydrAMINE Injectable 50 milliGRAM(s) IntraMuscular once PRN agitation  haloperidol     Tablet 5 milliGRAM(s) Oral every 6 hours PRN agitation  haloperidol    Injectable 5 milliGRAM(s) IntraMuscular once PRN agitation  hydrOXYzine hydrochloride 25 milliGRAM(s) Oral every 6 hours PRN anxiety  LORazepam     Tablet 2 milliGRAM(s) Oral every 6 hours PRN agitation  
MEDICATIONS  (PRN):  acetaminophen     Tablet .. 650 milliGRAM(s) Oral every 6 hours PRN Mild Pain (1 - 3)  ALBUTerol    90 MICROgram(s) HFA Inhaler 2 Puff(s) Inhalation every 6 hours PRN asthma  diphenhydrAMINE 50 milliGRAM(s) Oral every 6 hours PRN agitation  diphenhydrAMINE Injectable 50 milliGRAM(s) IntraMuscular once PRN agitation  haloperidol     Tablet 5 milliGRAM(s) Oral every 6 hours PRN agitation  haloperidol    Injectable 5 milliGRAM(s) IntraMuscular once PRN agitation  hydrOXYzine hydrochloride 25 milliGRAM(s) Oral every 6 hours PRN anxiety  LORazepam     Tablet 2 milliGRAM(s) Oral every 6 hours PRN agitation

## 2022-07-29 NOTE — BH INPATIENT PSYCHIATRY PROGRESS NOTE - NSBHASSESSSUMMFT_PSY_ALL_CORE
31 year old man with no inpatient hospitalizations, no suicide attempts, living with wife and inlaws, difficulty holding jobs presents with suicidal ideation, anxious/panic symptoms after ongoing anxious episodes at work which lead him to remain in bed, helpless, hopeless, worthless and SI.  He has been treated as an outpatient for a few months and with lexapro 15mg.  Differential includes MDD, RICKIE, Panic disorder, performance anxiety and personality pathology (dependent? borderline?).      We increased lexapro to 20mg daily for anxiety and depression and changed ativan from on emg daily to 0.5mg three times daily and added propranolol 20mg three times daily for anxiety and he woke the next day reporting improvement which was sustained.  He reported improved mood, improved anxiety, improved sleep and consistently denied SI/HI intents or plans.  He was discharged with a brdge appointment at crisis clinic and [facility] follow up9/9. 30 day supply of propranolol 20 mg three times daily, ativan 0.5mg three times daily and lexapro 20mg daily.

## 2022-07-29 NOTE — BH INPATIENT PSYCHIATRY PROGRESS NOTE - NSBHCONSBHPROVDETAILS_PSY_A_CORE  FT
Psych NP Marilu Pike and therapist Daysi Em at [facility] [phone] called, message left, awaiting call back 
Psych NP Marilu Pike and therapist Daysi Em at [facility] [phone] called, message left, awaiting call back 
Psych NP Marilu Pike and therapist Daysi Em at [facility] [phone] called, message left, awaiting call back 
Psych NP Marilu Pike and therapist Daysi Em at [facility] [phone] called, message left, awaiting call back 
Psych NP Marilu Pike and therapist Daysi Em at [facility] [phone] called, message left, awaiting call back 
Psych NP Marilu Pike and therapist Daysi Em at [facility] [phone] called, message left, awaiting call back 
Psych NP Marilu Pike and therapist Daysi Em at [facility] [phone] called, message left, awaiting call back 
Psych NP Marilu Pike and therapist Daysi Em at [facility] [phone] called, message left, awaiting call back

## 2022-07-29 NOTE — BH INPATIENT PSYCHIATRY PROGRESS NOTE - NSTXDEPRESGOAL_PSY_ALL_CORE
Attend and participate in at least 2 groups daily despite low mood/energy

## 2022-07-29 NOTE — BH INPATIENT PSYCHIATRY PROGRESS NOTE - NSBHMETABOLICLABS_PSY_ALL_CORE
All labs not within last 12 months, ordered

## 2022-07-29 NOTE — BH DISCHARGE NOTE NURSING/SOCIAL WORK/PSYCH REHAB - NSDCPRGOAL_PSY_ALL_CORE
[id number] During this hospitalization, patient made progress toward rehabilitation goals, and exhibited improvement in regards to functioning and sx management. Patient was hospitalized due to worsening depression, anxiety and SI. Patient has exhibited improvement in functioning and sx management. Patient was able to identify effective coping skills to manage sx such as deep breathing and meditation. Patient attended approximately 75% of rehabilitation groups this week. Patient was observed socializing with select peers. Patient is cooperative and pleasant on approach. Patient denied AH/VH/SI/HI.   Patient reported improvement in sx and has been compliant with his medication regimen. Patient's appears disheveled. Patient's exhibits fair insight and judgment.     Upon discharge, patient engaged in safety planning and completed a Press Ganey Survey.

## 2022-07-29 NOTE — BH INPATIENT PSYCHIATRY DISCHARGE NOTE - HPI (INCLUDE ILLNESS QUALITY, SEVERITY, DURATION, TIMING, CONTEXT, MODIFYING FACTORS, ASSOCIATED SIGNS AND SYMPTOMS)
Per [facility] handoff from MANUELA Carter on 7/21/22: "Patient is a 31-year-old male, , domiciled with wife, sister and brother-in-law, father and MIL, currently employed FT at [employer], no children, dx with social anxiety d/o diagnosed around age 7/8, depression and anxiety, currently in treatment  with a psych NP Marilu Pike for 3-4months, and therapist Daysi Barrientos for 3-4 months both located at [facility] [phone], currently on Lexapro 15mg and Ativan 1mg every other day, previous trial on Zoloft and Prozac but complain of side effects and possible dissociations, hx of treatment in [facility] in December 2021 for anxious and poor sleep in the context of stressors, no prior inpatient admission, no SA/NSSIB, PMH of GERD, hx of gastritis, hx of esophageal strictures, NKDA, Substance use: ETOH use where he would have 1-2 beers a day – last was 2 nights ago, reports no hx of blackouts/withdrawals. Pt reports hx of marijuana use, smoked “a lot” last years because he felt it help with anxiety, stopped smoking in December 2021 but has 1 puff “couple weeks ago” do to peer pressure. Pt denies all other substance use/abuse, denies access to firearms presents to [facility] seeking inpatient admission. Patient presents with c/o of depression, anxiety, and SI. He is currently in treatment but reports current regiment has not been effective at managing his symptoms. He reports anxious mood with racing thoughts, racing heart, over thinking and excess worries, trouble relaxing, reports muscle tremors when he is anxious. Patient reports poor sleep where he gets 3-4 hours at night, c/o feeling tired and fatigued during the day, feels unmotivated, lays in bed all day, reports poor focus and concentration, reports poor appetite with suspected weight loss. He attempts to use coping skills such as meditation, grounding techniques and distraction but it has not been effective. He reports SI with multiple methods. This morning he has thoughts/urges to stab self in the neck with a pen he was using. He states at work there are “very tall ladders” and have thoughts of climbing and jumping. He also reports access to box cutters with thoughts to cut self with suicidal intent. He reports thought to OD on his left-over medication, specifically propranolol to “stop my heart and die.” He also mentioned CO2 poison, reports blocking his tail pipe and dying by CO2 asphyxiation or by hanging himself. He reports there is a “good chance” he would act on one of these methods if he went home and was unable to safety plan. He denies HI jose or psychosis.     Medication trial: He has been on Lexapro since 2020 prescribed by PMD. He was seen in [facility] in December 2021 with c/o poor sleep and worsened anxiety and started in Hydroxyzine. He went to the ED that same day with GI complaints, admitted medically for 2 days then discharge on Ativan. He connected to treatment in March 2022 with current providers. Lexapro increased to 20mg and restarted on Ativan 1mg, felt anxiety was worst and Lexapro reduced back to 15mg 1 month ago. NP attempted to cross jered to Paxil, but anxiety worsened, and it was discontinued. Tried propranolol which lowed his heart rate, but anxiety continued. Currently on Lexapro 15mg, Ativan 1mg every over day, hydroxyzine 25mg PRN.  PMH: hx of GERD, hx of gastritis, hx of esophageal strictures, NKDA.   Family Hx: Pt reports his mother carries diagnoses of Bipolar D/O and OCD, reports she has hx of suicide attempts and psychiatric admissions. Pt reports his paternal uncle is diagnosed with autism. Pt reports his maternal grandfather had hx of alcohol use d/o.   Current medication: Lexapro 15mg, Ativan 1mg every other day, Prilosec 40mg daily, Dicyclomine 10mg once a day (no formal dx with IBS), hydroxyzine 25mg PRN     Collateral from Wife Iman: Reports patient is “desperate” to for help, aware of SI, patient states he does not care if he lives or dies but denies patient has mentioned ways in which he can take his life. In support of him being in the hospital.    Unable to reach current providers, called and left VM."

## 2022-07-29 NOTE — BH INPATIENT PSYCHIATRY DISCHARGE NOTE - NSBHDCHANDOFFFT_PSY_ALL_CORE
emailed rosemary rawls at [facility] to solicit questions, etc as patient with bridging appointment there 8/3

## 2022-07-29 NOTE — BH INPATIENT PSYCHIATRY DISCHARGE NOTE - NSBHMETABOLIC_PSY_ALL_CORE_FT
BMI: BMI (kg/m2): 35.1 (07-21-22 @ 14:23)  HbA1c: A1C with Estimated Average Glucose Result: 5.1 % (07-22-22 @ 08:45)    Glucose: POCT Blood Glucose.: 101 mg/dL (12-15-21 @ 00:29)    BP: 116/73 (07-27-22 @ 08:41) (116/73 - 116/73)  Lipid Panel: Date/Time: 07-22-22 @ 08:45  Cholesterol, Serum: 192  Direct LDL: --  HDL Cholesterol, Serum: 32  Total Cholesterol/HDL Ration Measurement: --  Triglycerides, Serum: 63

## 2022-07-29 NOTE — BH INPATIENT PSYCHIATRY PROGRESS NOTE - NSTXDCSOCGOAL_PSY_ALL_CORE
Will accept referrals to support groups, clubhouse, senior centers, etc.

## 2022-07-29 NOTE — BH DISCHARGE NOTE NURSING/SOCIAL WORK/PSYCH REHAB - PATIENT PORTAL LINK FT
You can access the FollowMyHealth Patient Portal offered by [facility] by registering at the following website: [email or web address]. By joining [employer]’s FollowMyHealth portal, you will also be able to view your health information using other applications (apps) compatible with our system.

## 2022-07-29 NOTE — BH INPATIENT PSYCHIATRY PROGRESS NOTE - NSBHMSETHTCONTENT_PSY_A_CORE
Ruminations
Ruminations/Hopelessness/Guilt/Suicidality
Ruminations
Ruminations
Ruminations/Hopelessness/Guilt/Suicidality
Ruminations/Hopelessness/Guilt/Suicidality
Ruminations
Ruminations

## 2022-07-29 NOTE — BH INPATIENT PSYCHIATRY PROGRESS NOTE - NSTXANXINTERMD_PSY_ALL_CORE
medication management 

## 2022-07-29 NOTE — BH INPATIENT PSYCHIATRY PROGRESS NOTE - NSBHCHARTREVIEWVS_PSY_A_CORE FT
Vital Signs Last 24 Hrs  T(C): 36.7 (07-29-22 @ 08:14), Max: 36.9 (07-28-22 @ 20:35)  T(F): 98 (07-29-22 @ 08:14), Max: 98.5 (07-28-22 @ 20:35)  HR: --  BP: --  BP(mean): --  RR: --  SpO2: --    Orthostatic VS  07-29-22 @ 08:14  Lying BP: --/-- HR: --  Sitting BP: 111/74 HR: 74  Standing BP: --/-- HR: --  Site: --  Mode: --  Orthostatic VS  07-28-22 @ 20:35  Lying BP: --/-- HR: --  Sitting BP: 117/69 HR: 76  Standing BP: --/-- HR: --  Site: --  Mode: --  Orthostatic VS  07-28-22 @ 08:51  Lying BP: --/-- HR: --  Sitting BP: 117/70 HR: 69  Standing BP: --/-- HR: --  Site: --  Mode: --  Orthostatic VS  07-27-22 @ 21:53  Lying BP: --/-- HR: --  Sitting BP: --/-- HR: --  Standing BP: 126/70 HR: 75  Site: --  Mode: --

## 2022-07-29 NOTE — BH INPATIENT PSYCHIATRY PROGRESS NOTE - NSDCCRITERIA_PSY_ALL_CORE
CGI <=2, return to baseline functioning as evidenced by behavioral control, staff observations, pt self report 

## 2022-07-29 NOTE — BH INPATIENT PSYCHIATRY DISCHARGE NOTE - NSDCMRMEDTOKEN_GEN_ALL_CORE_FT
albuterol 90 mcg/inh inhalation aerosol: 2 puff(s) inhaled every 6 hours, As needed, asthma  escitalopram 20 mg oral tablet: 1 tab(s) orally once a day  LORazepam 0.5 mg oral tablet: 1 tab(s) orally 3 times a day MDD:1.5mg  propranolol 20 mg oral tablet: 1 tab(s) orally 3 times a day  Protonix 40 mg oral delayed release tablet: 1 tab(s) orally once a day (before a meal)

## 2022-07-29 NOTE — BH INPATIENT PSYCHIATRY PROGRESS NOTE - NSTXANXGOAL_PSY_ALL_CORE
Identify and practice 3 coping skills to manage anxiety
Be able to perform ADLs and maintain safety despite anxiety/panic daily
Identify and practice 3 coping skills to manage anxiety

## 2022-07-29 NOTE — BH INPATIENT PSYCHIATRY DISCHARGE NOTE - HOSPITAL COURSE
to be done	 31 year old man with no inpatient hospitalizations, no suicide attempts, living with wife and inlaws, difficulty holding jobs presents with suicidal ideation, anxious/panic symptoms after ongoing anxious episodes at work which lead him to remain in bed, helpless, hopeless, worthless and SI.  He has been treated as an outpatient for a few months and with lexapro 15mg.  Differential includes MDD, RICKIE, Panic disorder, performance anxiety and personality pathology (dependent? borderline?).      We increased lexapro to 20mg daily for anxiety and depression and changed ativan from on emg daily to 0.5mg three times daily and added propranolol 20mg three times daily for anxiety and he woke the next day reporting improvement which was sustained.  He reported improved mood, improved anxiety, improved sleep and consistently denied SI/HI intents or plans.  He was discharged with a brdge appointment at crisis clinic and [facility] follow up9/9. 30 day supply of propranolol 20 mg three times daily, ativan 0.5mg three times daily and lexapro 20mg daily.    Patient described a mentally ill mother; being bullied in school; home schooled from 4th grade until about high school; anxiety and panic attacks and troubel with organization in high school through college; graduating college, living at home and having difficulty holding jobs; meeting a girlfriend over the internet through a website about mutual interest; and going to live with wife and her family; and feelings of disappointing his wife and her family because of difficulties maintaining jobs because of feeling overwhelmed and panicked.  We feel that his difficult early history and what appear to be feral home schooling conditions limited his ego defenses and organization/planning skills and his self image, making the work place environment with frequent demands and difficulties difficult for him. He felt somewhat younger and more regressed than one would expect from a 31 year old.    He seems to become overwhelmed and experience panic attacks and therefore does not function and therefore becomes depressed and suicidal.  We thought it made sense to maximize his ssri,given efficacy in depression and anxiety.  We thought his ativan every other day was unlikely to help him and opted to change ativan to 0.5mg three times daily which would hopefully help baseline anxiety while titrating ssri.  We also added propranolol 20 three times daily because anxiety and what appears to be an overly responsive tone to his sympathetic nervous system.   He would benefit from skill building and other programming so we referred him to [facility] but he has a bridging appointment at walk in clinic.      His acute presenting symptoms are much improved and he is not an acute danger to himself or others.    His situation with wife and family improved somewhat as he discussed plans to obtain a part time job and "ease" into things while also attending [employer].

## 2022-07-29 NOTE — BH DISCHARGE NOTE NURSING/SOCIAL WORK/PSYCH REHAB - NSDCPRRECOMMEND_PSY_ALL_CORE
Patient will attend outpatient treatment for ongoing medication management, support, and psychotherapy.

## 2022-08-03 ENCOUNTER — OUTPATIENT (OUTPATIENT)
Dept: OUTPATIENT SERVICES | Facility: HOSPITAL | Age: 32
LOS: 1 days | Discharge: STILL ADMITTED OR ADMINISTRATIVE | End: 2022-08-03

## 2022-08-03 DIAGNOSIS — F32.9 MAJOR DEPRESSIVE DISORDER, SINGLE EPISODE, UNSPECIFIED: ICD-10-CM

## 2022-08-03 DIAGNOSIS — Z87.2 PERSONAL HISTORY OF DISEASES OF THE SKIN AND SUBCUTANEOUS TISSUE: Chronic | ICD-10-CM

## 2022-08-03 DIAGNOSIS — Z98.890 OTHER SPECIFIED POSTPROCEDURAL STATES: Chronic | ICD-10-CM

## 2022-08-03 PROCEDURE — 90839 PSYTX CRISIS INITIAL 60 MIN: CPT | Mod: 95

## 2022-08-08 ENCOUNTER — OUTPATIENT (OUTPATIENT)
Dept: OUTPATIENT SERVICES | Facility: HOSPITAL | Age: 32
LOS: 1 days | Discharge: HOME | End: 2022-08-08
Payer: COMMERCIAL

## 2022-08-08 DIAGNOSIS — Z87.2 PERSONAL HISTORY OF DISEASES OF THE SKIN AND SUBCUTANEOUS TISSUE: Chronic | ICD-10-CM

## 2022-08-08 DIAGNOSIS — Z98.890 OTHER SPECIFIED POSTPROCEDURAL STATES: Chronic | ICD-10-CM

## 2022-08-09 PROCEDURE — 90792 PSYCH DIAG EVAL W/MED SRVCS: CPT | Mod: 95

## 2022-08-12 PROCEDURE — 90832 PSYTX W PT 30 MINUTES: CPT | Mod: 95

## 2022-08-22 PROCEDURE — 99214 OFFICE O/P EST MOD 30 MIN: CPT | Mod: 95

## 2022-08-24 PROCEDURE — 90834 PSYTX W PT 45 MINUTES: CPT | Mod: 95

## 2022-08-31 DIAGNOSIS — F32.9 MAJOR DEPRESSIVE DISORDER, SINGLE EPISODE, UNSPECIFIED: ICD-10-CM

## 2022-08-31 PROCEDURE — 90834 PSYTX W PT 45 MINUTES: CPT | Mod: 95

## 2022-09-07 PROCEDURE — 99214 OFFICE O/P EST MOD 30 MIN: CPT | Mod: 95

## 2022-09-08 PROCEDURE — 90832 PSYTX W PT 30 MINUTES: CPT | Mod: 95

## 2022-09-20 PROCEDURE — 99214 OFFICE O/P EST MOD 30 MIN: CPT | Mod: 95

## 2022-09-21 PROCEDURE — 90832 PSYTX W PT 30 MINUTES: CPT | Mod: 95

## 2022-09-28 PROCEDURE — 90834 PSYTX W PT 45 MINUTES: CPT | Mod: 95

## 2022-10-04 PROCEDURE — 99214 OFFICE O/P EST MOD 30 MIN: CPT | Mod: 95

## 2022-10-12 PROCEDURE — 90832 PSYTX W PT 30 MINUTES: CPT | Mod: 95

## 2022-10-18 PROCEDURE — 99214 OFFICE O/P EST MOD 30 MIN: CPT | Mod: 95

## 2022-10-26 PROCEDURE — 90834 PSYTX W PT 45 MINUTES: CPT | Mod: 95

## 2022-10-31 PROCEDURE — 90832 PSYTX W PT 30 MINUTES: CPT | Mod: 95

## 2022-11-02 PROCEDURE — 90834 PSYTX W PT 45 MINUTES: CPT | Mod: 95

## 2022-11-02 PROCEDURE — 99214 OFFICE O/P EST MOD 30 MIN: CPT | Mod: 95

## 2022-11-08 ENCOUNTER — INPATIENT (INPATIENT)
Facility: HOSPITAL | Age: 32
LOS: 20 days | Discharge: HOME | End: 2022-11-29
Attending: PSYCHIATRY & NEUROLOGY | Admitting: PSYCHIATRY & NEUROLOGY
Payer: COMMERCIAL

## 2022-11-08 VITALS — TEMPERATURE: 98 F | OXYGEN SATURATION: 100 % | HEART RATE: 98 BPM | RESPIRATION RATE: 14 BRPM

## 2022-11-08 DIAGNOSIS — Z98.890 OTHER SPECIFIED POSTPROCEDURAL STATES: Chronic | ICD-10-CM

## 2022-11-08 DIAGNOSIS — F33.2 MAJOR DEPRESSIVE DISORDER, RECURRENT SEVERE WITHOUT PSYCHOTIC FEATURES: ICD-10-CM

## 2022-11-08 DIAGNOSIS — Z87.2 PERSONAL HISTORY OF DISEASES OF THE SKIN AND SUBCUTANEOUS TISSUE: Chronic | ICD-10-CM

## 2022-11-08 LAB
ALBUMIN SERPL ELPH-MCNC: 4.6 G/DL — SIGNIFICANT CHANGE UP (ref 3.3–5)
ALP SERPL-CCNC: 86 U/L — SIGNIFICANT CHANGE UP (ref 40–120)
ALT FLD-CCNC: 23 U/L — SIGNIFICANT CHANGE UP (ref 4–41)
AMPHET UR-MCNC: NEGATIVE — SIGNIFICANT CHANGE UP
ANION GAP SERPL CALC-SCNC: 14 MMOL/L — SIGNIFICANT CHANGE UP (ref 7–14)
APAP SERPL-MCNC: <10 UG/ML — LOW (ref 15–25)
AST SERPL-CCNC: 19 U/L — SIGNIFICANT CHANGE UP (ref 4–40)
BARBITURATES UR SCN-MCNC: NEGATIVE — SIGNIFICANT CHANGE UP
BASOPHILS # BLD AUTO: 0.04 K/UL — SIGNIFICANT CHANGE UP (ref 0–0.2)
BASOPHILS NFR BLD AUTO: 0.5 % — SIGNIFICANT CHANGE UP (ref 0–2)
BENZODIAZ UR-MCNC: NEGATIVE — SIGNIFICANT CHANGE UP
BILIRUB SERPL-MCNC: 0.7 MG/DL — SIGNIFICANT CHANGE UP (ref 0.2–1.2)
BUN SERPL-MCNC: 14 MG/DL — SIGNIFICANT CHANGE UP (ref 7–23)
CALCIUM SERPL-MCNC: 9.6 MG/DL — SIGNIFICANT CHANGE UP (ref 8.4–10.5)
CHLORIDE SERPL-SCNC: 101 MMOL/L — SIGNIFICANT CHANGE UP (ref 98–107)
CO2 SERPL-SCNC: 25 MMOL/L — SIGNIFICANT CHANGE UP (ref 22–31)
COCAINE METAB.OTHER UR-MCNC: NEGATIVE — SIGNIFICANT CHANGE UP
CREAT SERPL-MCNC: 0.99 MG/DL — SIGNIFICANT CHANGE UP (ref 0.5–1.3)
CREATININE URINE RESULT, DAU: 426 MG/DL — SIGNIFICANT CHANGE UP
EGFR: 105 ML/MIN/1.73M2 — SIGNIFICANT CHANGE UP
EOSINOPHIL # BLD AUTO: 0.04 K/UL — SIGNIFICANT CHANGE UP (ref 0–0.5)
EOSINOPHIL NFR BLD AUTO: 0.5 % — SIGNIFICANT CHANGE UP (ref 0–6)
ETHANOL SERPL-MCNC: <10 MG/DL — SIGNIFICANT CHANGE UP
FLUAV AG NPH QL: SIGNIFICANT CHANGE UP
FLUBV AG NPH QL: SIGNIFICANT CHANGE UP
GLUCOSE SERPL-MCNC: 86 MG/DL — SIGNIFICANT CHANGE UP (ref 70–99)
HCT VFR BLD CALC: 47.4 % — SIGNIFICANT CHANGE UP (ref 39–50)
HGB BLD-MCNC: 16.6 G/DL — SIGNIFICANT CHANGE UP (ref 13–17)
IANC: 5.75 K/UL — SIGNIFICANT CHANGE UP (ref 1.8–7.4)
IMM GRANULOCYTES NFR BLD AUTO: 0.3 % — SIGNIFICANT CHANGE UP (ref 0–0.9)
LIDOCAIN IGE QN: 19 U/L — SIGNIFICANT CHANGE UP (ref 7–60)
LYMPHOCYTES # BLD AUTO: 1.49 K/UL — SIGNIFICANT CHANGE UP (ref 1–3.3)
LYMPHOCYTES # BLD AUTO: 19 % — SIGNIFICANT CHANGE UP (ref 13–44)
MCHC RBC-ENTMCNC: 30.2 PG — SIGNIFICANT CHANGE UP (ref 27–34)
MCHC RBC-ENTMCNC: 35 GM/DL — SIGNIFICANT CHANGE UP (ref 32–36)
MCV RBC AUTO: 86.3 FL — SIGNIFICANT CHANGE UP (ref 80–100)
METHADONE UR-MCNC: NEGATIVE — SIGNIFICANT CHANGE UP
MONOCYTES # BLD AUTO: 0.49 K/UL — SIGNIFICANT CHANGE UP (ref 0–0.9)
MONOCYTES NFR BLD AUTO: 6.3 % — SIGNIFICANT CHANGE UP (ref 2–14)
NEUTROPHILS # BLD AUTO: 5.75 K/UL — SIGNIFICANT CHANGE UP (ref 1.8–7.4)
NEUTROPHILS NFR BLD AUTO: 73.4 % — SIGNIFICANT CHANGE UP (ref 43–77)
NRBC # BLD: 0 /100 WBCS — SIGNIFICANT CHANGE UP (ref 0–0)
NRBC # FLD: 0 K/UL — SIGNIFICANT CHANGE UP (ref 0–0)
OPIATES UR-MCNC: NEGATIVE — SIGNIFICANT CHANGE UP
OXYCODONE UR-MCNC: NEGATIVE — SIGNIFICANT CHANGE UP
PCP SPEC-MCNC: SIGNIFICANT CHANGE UP
PCP UR-MCNC: NEGATIVE — SIGNIFICANT CHANGE UP
PLATELET # BLD AUTO: 360 K/UL — SIGNIFICANT CHANGE UP (ref 150–400)
POTASSIUM SERPL-MCNC: 4.3 MMOL/L — SIGNIFICANT CHANGE UP (ref 3.5–5.3)
POTASSIUM SERPL-SCNC: 4.3 MMOL/L — SIGNIFICANT CHANGE UP (ref 3.5–5.3)
PROT SERPL-MCNC: 7.6 G/DL — SIGNIFICANT CHANGE UP (ref 6–8.3)
RBC # BLD: 5.49 M/UL — SIGNIFICANT CHANGE UP (ref 4.2–5.8)
RBC # FLD: 11.8 % — SIGNIFICANT CHANGE UP (ref 10.3–14.5)
RSV RNA NPH QL NAA+NON-PROBE: SIGNIFICANT CHANGE UP
SALICYLATES SERPL-MCNC: <0.3 MG/DL — LOW (ref 15–30)
SARS-COV-2 RNA SPEC QL NAA+PROBE: SIGNIFICANT CHANGE UP
SODIUM SERPL-SCNC: 140 MMOL/L — SIGNIFICANT CHANGE UP (ref 135–145)
THC UR QL: NEGATIVE — SIGNIFICANT CHANGE UP
WBC # BLD: 7.83 K/UL — SIGNIFICANT CHANGE UP (ref 3.8–10.5)
WBC # FLD AUTO: 7.83 K/UL — SIGNIFICANT CHANGE UP (ref 3.8–10.5)

## 2022-11-08 PROCEDURE — 99285 EMERGENCY DEPT VISIT HI MDM: CPT

## 2022-11-08 PROCEDURE — 71045 X-RAY EXAM CHEST 1 VIEW: CPT | Mod: 26

## 2022-11-08 PROCEDURE — 90832 PSYTX W PT 30 MINUTES: CPT | Mod: 95

## 2022-11-08 RX ORDER — LIDOCAINE 4 G/100G
10 CREAM TOPICAL ONCE
Refills: 0 | Status: DISCONTINUED | OUTPATIENT
Start: 2022-11-08 | End: 2022-11-09

## 2022-11-08 RX ORDER — FAMOTIDINE 10 MG/ML
20 INJECTION INTRAVENOUS ONCE
Refills: 0 | Status: COMPLETED | OUTPATIENT
Start: 2022-11-08 | End: 2022-11-08

## 2022-11-08 RX ORDER — PANTOPRAZOLE SODIUM 20 MG/1
40 TABLET, DELAYED RELEASE ORAL
Refills: 0 | Status: DISCONTINUED | OUTPATIENT
Start: 2022-11-09 | End: 2022-11-29

## 2022-11-08 RX ADMIN — Medication 30 MILLILITER(S): at 13:10

## 2022-11-08 RX ADMIN — FAMOTIDINE 20 MILLIGRAM(S): 10 INJECTION INTRAVENOUS at 13:10

## 2022-11-08 NOTE — ED PROVIDER NOTE - OBJECTIVE STATEMENT
33 yo M w/ PMH of anxiety, GERD, asthma, and esophageal stricture s/p balloon dilation in 2018, presenting to the emergency department for complaint of substernal chest burning for the past 5 to 6 days.  states has had similar symptoms in the past due to esophageal stricture.  Patient believes that his pain is due to a brief discontinuation of his PPI at home.  States he is still able to swallow food but believes transit is reduced.  Having normal bowel movements but states that they are dark due to Pepto-Bismol.  Today patient also reporting depressive symptoms and anxious symptoms, with suicidal ideations due to his ongoing pain.  Patient spoke with his therapist and was directed to ER.  Denies cough congestion or shortness of breath.  Denies vomiting. Last endoscopy in March 2022 unremarkable.

## 2022-11-08 NOTE — ED BEHAVIORAL HEALTH ASSESSMENT NOTE - CURRENT MEDICATION
Lexapro 20 mg po daily, Propranolol 20 mg po daily, Ativan 0.5 mg po daily, Pantoprazole 40 mg po daily

## 2022-11-08 NOTE — ED BEHAVIORAL HEALTH NOTE - BEHAVIORAL HEALTH NOTE
COVID Exposure Screen- Patient    1.	*Have you had a COVID-19 test in the last 90 days?  (  ) Yes   (x  ) No   (  ) Unknown- Reason: _____  IF YES PROCEED TO QUESTION #2. IF NO OR UNKNOWN, PLEASE SKIP TO QUESTION #3.  2.	Date of test(s) and result(s): ________    3.	*Have you tested positive for COVID-19 antibodies? (  ) Yes   (  x) No   (  ) Unknown- Reason: _____  IF YES PROCEED TO QUESTION #4. IF NO or UNKNOWN, PLEASE SKIP TO QUESTION #5.  4.	Date of positive antibody test: ________    5.	*Have you received 2 doses of the COVID-19 vaccine? (  x) Yes   (  ) No   (  ) Unknown- Reason: _____   IF YES PROCEED TO QUESTION #6. IF NO or UNKNOWN, PLEASE SKIP TO QUESTION #7.  6.	Date of second dose: ________    7.	*In the past 10 days, have you been around anyone with a positive COVID-19 test?* (  ) Yes   (  x) No   (  ) Unknown- Reason: ____  IF YES PROCEED TO QUESTION #8. IF NO or UNKNOWN, PLEASE SKIP TO QUESTION #13.  8.	Were you within 6 feet of them for at least 15 minutes? (  ) Yes   (  ) No   (  ) Unknown- Reason: _____  9.	Have you provided care for them? (  ) Yes   (  ) No   (  ) Unknown- Reason: ______  10.	Have you had direct physical contact with them (touched, hugged, or kissed them)? (  ) Yes   (  ) No    (  ) Unknown- Reason: _____  11.	Have you shared eating or drinking utensils with them? (  ) Yes   (  ) No    (  ) Unknown- Reason: ____  12.	Have they sneezed, coughed, or somehow gotten respiratory droplets on you? (  ) Yes   (  ) No    (  ) Unknown- Reason: ______    13.	*Have you been out of [place] within the past 10 days?* (  ) Yes   (x  ) No   (  ) Unknown- Reason: _____  IF YES PLEASE ANSWER THE FOLLOWING QUESTIONS:  14.	Which state/country have you been to? ______  15.	Were you there over 24 hours? (  ) Yes   (  ) No    (  ) Unknown- Reason: ______  16.	Date of return to [facility]: ______

## 2022-11-08 NOTE — ED BEHAVIORAL HEALTH ASSESSMENT NOTE - DESCRIPTION
GERD, Asthma anxious but in good behavioral control  Vital Signs Last 24 Hrs  T(C): 36.8 (08 Nov 2022 16:42), Max: 36.8 (08 Nov 2022 11:59)  T(F): 98.2 (08 Nov 2022 16:42), Max: 98.2 (08 Nov 2022 11:59)  HR: 87 (08 Nov 2022 16:42) (87 - 98)  BP: 120/82 (08 Nov 2022 16:42) (120/82 - 120/82)  BP(mean): --  RR: 18 (08 Nov 2022 16:42) (14 - 18)  SpO2: 99% (08 Nov 2022 16:42) (99% - 100%)    Parameters below as of 08 Nov 2022 11:59  Patient On (Oxygen Delivery Method): room air see HPI

## 2022-11-08 NOTE — ED ADULT NURSE NOTE - BOWEL SOUNDS RUQ
LVM to return call to reschedule     Also gave central scheduling number to make an appointment with Dr. Vickers or Cruzito Black ATC    
Left VM for patient regarding 6/3/19 appointment with Dr. George. I informed patient on the VM that the appointment will need to be rescheduled with another provider. OK for patient to follow up with Dr. Vickers or Cruzito.      Provided Triage number for call back.     Tona Miller, ATC   
Patient called stating she received the message to call back to schedule with a different provider than Dr. George. She has an appointment scheduled on 6/3/10 with Dr. George.     Please call patient.     MARIA EUGENIA Edwards RN    
Patient was rescheduled with Cruzito Rivera PA-C.     Closing encounter.     Tona Miller, ATC     
present

## 2022-11-08 NOTE — ED BEHAVIORAL HEALTH ASSESSMENT NOTE - DETAILS
Pt reports father's side with alcohol abuse, and reports mother with bipolar disorder, OCD, and DID. see HPI sent secure email to LEXIS Escamilla

## 2022-11-08 NOTE — ED PROVIDER NOTE - PROGRESS NOTE DETAILS
s/o to me from o/n dr. hull-no events, patient had medical w/u and now stable for psychiatric care and boarding for bed. TBA [facility] as now bed available.

## 2022-11-08 NOTE — ED PROVIDER NOTE - ATTENDING CONTRIBUTION TO CARE
Patient here to be evaluated for SI but c/o chest pain (which he claims is the reason he wants to kill himself   labs ekg all negative  no contraindication to psych eval  will dispo according to reccs and results of reassessment

## 2022-11-08 NOTE — ED BEHAVIORAL HEALTH ASSESSMENT NOTE - PSYCHIATRIC ISSUES AND PLAN (INCLUDE STANDING AND PRN MEDICATION)
Defer medication changes to inpatient team; continue Lexapro 20 mg po daily, Ativan 0.5 mg po daily, Propranolol 20 mg po daily

## 2022-11-08 NOTE — ED PROVIDER NOTE - NS ED ROS FT
Gen: Denies fever.   HEENT: Denies headache. Denies congestion.  CV: +chest pain. Denies lightheadedness.  Skin: Denies rash.   Resp: Denies SOB. Denies cough.  GI: Denies abd pain. +nausea. Denies vomiting. Denies diarrhea. +melena. Denies hematochezia.  Msk: Denies extremity swelling. Denies extremity pain.  : Denies dysuria. Denies hematuria.  Neuro: Denies LOC. Denies dizziness. Denies new numbness/tingling. Denies new focal weakness.  Psych: +SI

## 2022-11-08 NOTE — ED PROVIDER NOTE - CLINICAL SUMMARY MEDICAL DECISION MAKING FREE TEXT BOX
33 yo M w/ PMH of anxiety, GERD, asthma, and esophageal stricture s/p balloon dilation in 2018, presenting for substernal CP similar to that with past esophageal stricture. Pain subsequently increasing anxious and depressive symptoms, now w/ SI. Tolerating PO. Melena possibly 2/2 to pepto bismol. Exam as above. Concern for anemia, PUD, gastritis, stricture, melena, anemia, SI, depression. Labs, imaging, will reassess.

## 2022-11-08 NOTE — ED BEHAVIORAL HEALTH ASSESSMENT NOTE - HPI (INCLUDE ILLNESS QUALITY, SEVERITY, DURATION, TIMING, CONTEXT, MODIFYING FACTORS, ASSOCIATED SIGNS AND SYMPTOMS)
32-year-old  male unemployed living with spouse and her family, employed at [employer], with diagnosis of MDD with history of one prior psychiatric hospitalization here at [facility] from 7/21-7/29/2022 for suicidal thoughts. There is no history of suicide attempts, PMH GERD and Asthma, There is no history of alcohol or substance abuse Pt has been in treatment at [facility] since Aug 8 following his inpatient stay.    Collateral from [facility] SW, Sera Escamilla: "He had been doing ok managing his symptoms compliant with treatment until about two weeks ago. He began to feel more anxious with an increase in somatic complaints. He reported feeling more easily overwhelmed and having difficulty taking care of ADLs spending more time in his room isolating. He had some increased stress surrounding his birthday last week turning 32 and his second wedding anniversary. He mentioned some new concerns about his gender identity. He had difficulty enjoying the plans made to celebrate and did not follow through on some of them, telling his wife that he just wasn't  up to going out.. He reported an increase in pain related to stomach issue and became more hyper focused on these complaints. He reached out for additional support several times in between session. He denied any suicidal thoughts but was feeling more hopeless and helpless last week.   Today pt reaches out reporting suicidal thoughts with new complaint of increased pain " in my esophagus". He reports pain to be unmanageable and he "can't take it anymore". He reported suicidal thoughts of " taking my army knife that I have here and stabbing myself" or overdose on medications. He was tearful and guilty about how he was feeling stating "I know I'm letting people down" but he was uncertain if after our zoom session he would be able to keep himself safe and not act on these thoughts. He agreed to ask a family member to take him to the ER and was requesting admission. He agreed to not act on any thoughts.  He is on prescribed medications as follows: Lexapro 20 MG, TAB, PO, Take one (1) tablet by mouth daily  LORazepam 0.5 MG, TAB, PO, Take one (1) tablet by mouth three times a day  Propranolol HCl 20 MG, TAB, PO, Take one (1) tablet by mouth three times a day  Protonix 40 MG, TCP, PO, Take one (1) tablet by mouth every morning, before meal" 32-year-old  male unemployed living with spouse and her family, with diagnosis of MDD with history of one prior psychiatric hospitalization at [facility] from 7/21-7/29/2022 for suicidal thoughts, treated at [facility] since Aug 2022, currently on Lexapro, Ativan, and Propranolol, no history of self-injurious behavior or suicide attempts, no h/o violence or legal issues, PMH GERD and Asthma, no h/o substance abuse, referred by [facility] providers for SI and worsening depression/anxiety.     Collateral from [facility] SW, Sera Escamilla: "He had been doing ok managing his symptoms compliant with treatment until about two weeks ago. He began to feel more anxious with an increase in somatic complaints. He reported feeling more easily overwhelmed and having difficulty taking care of ADLs spending more time in his room isolating. He had some increased stress surrounding his birthday last week turning 32 and his second wedding anniversary. He mentioned some new concerns about his gender identity. He had difficulty enjoying the plans made to celebrate and did not follow through on some of them, telling his wife that he just wasn't  up to going out.. He reported an increase in pain related to stomach issue and became more hyper focused on these complaints. He reached out for additional support several times in between session. He denied any suicidal thoughts but was feeling more hopeless and helpless last week.   Today pt reaches out reporting suicidal thoughts with new complaint of increased pain " in my esophagus". He reports pain to be unmanageable and he "can't take it anymore". He reported suicidal thoughts of " taking my army knife that I have here and stabbing myself" or overdose on medications. He was tearful and guilty about how he was feeling stating "I know I'm letting people down" but he was uncertain if after our zoom session he would be able to keep himself safe and not act on these thoughts. He agreed to ask a family member to take him to the ER and was requesting admission. He agreed to not act on any thoughts.  He is on prescribed medications as follows: Lexapro 20 MG, TAB, PO, Take one (1) tablet by mouth daily  LORazepam 0.5 MG, TAB, PO, Take one (1) tablet by mouth three times a day  Propranolol HCl 20 MG, TAB, PO, Take one (1) tablet by mouth three times a day  Protonix 40 MG, TCP, PO, Take one (1) tablet by mouth every morning, before meal"    On interview, pt states he was referred to ED by [facility] providers because "I don't feel safe." Pt reports worsening depression and anxiety over the past few weeks. He cites several stressors, including turning 32 years old last week, as well as questioning his "identity" (pt declined to disclose details while in the hallway in the medical ED), and persistent chest tightness and "burning." Pt acknowledges that his "stress" could be contributing to his chest discomfort. Of note, pt medically cleared by ED providers. Pt reports being unemployed since July 2022. States he has been spending most of the day in bed, with low energy and low motivation. He reports poor concentration, sometimes has interrupted sleep. He sometimes goes 1-2 days without eating because he is afraid eating will worsen his chest burning/tightness. He reports anhedonia and sometimes feels hopeless. For the past few days, pt has been experiencing SI with thoughts of starving himself or stabbing himself with a knife at home. Pt states he fears that he will act on it at home. He denies manic or psychotic symptoms. He denies homicidal or violent ideation, intent, or plan. His Ativan and Propranolol are prescribed as TID, but he has only been taking these meds in the morning with his other morning meds (Lexapro and Pantoprazole). States he had a drink on his 32th birthday last week, but otherwise he hasn't been drinking alcohol. States he hasn't used marijuana in about 1 year. He denies other substance use.

## 2022-11-08 NOTE — ED ADULT TRIAGE NOTE - CHIEF COMPLAINT QUOTE
pt speaking with therapist this am c/o worsening epigastric pain with nausea. pt having increased anxiety secondary to pain with thoughts of want ot harm self/ SI. denies HI/ AVH. PMH: anxiety, asthma

## 2022-11-08 NOTE — ED BEHAVIORAL HEALTH ASSESSMENT NOTE - SUMMARY
32-year-old  male unemployed living with spouse and her family, with diagnosis of MDD with history of one prior psychiatric hospitalization at [facility] from 7/21-7/29/2022 for suicidal thoughts, treated at [facility] since Aug 2022, currently on Lexapro, Ativan, and Propranolol, no history of self-injurious behavior or suicide attempts, no h/o violence or legal issues, PMH GERD and Asthma, no h/o substance abuse, referred by [facility] providers for SI and worsening depression/anxiety. Pt requests and meets criteria for voluntary psychiatric admission for safety and stabilization.

## 2022-11-08 NOTE — ED PROVIDER NOTE - PHYSICAL EXAMINATION
Gen: Alert  HEENT: Atraumatic. Mucous membranes moist.  CV: RRR. No significant LE edema.   Resp: Unlabored-respirations. CTAB.  GI: Abdomen minimally ttp in epigastrium, otherwise non tender to palpation, soft.  Skin/MSK: No open wounds.   Neuro: EOMI. Pupils ERRL. Following commands.   Psych: Appropriate mood, cooperative

## 2022-11-09 DIAGNOSIS — F33.2 MAJOR DEPRESSIVE DISORDER, RECURRENT SEVERE WITHOUT PSYCHOTIC FEATURES: ICD-10-CM

## 2022-11-09 PROCEDURE — 99232 SBSQ HOSP IP/OBS MODERATE 35: CPT

## 2022-11-09 RX ORDER — ESCITALOPRAM OXALATE 10 MG/1
15 TABLET, FILM COATED ORAL DAILY
Refills: 0 | Status: DISCONTINUED | OUTPATIENT
Start: 2022-11-09 | End: 2022-11-11

## 2022-11-09 RX ORDER — HALOPERIDOL DECANOATE 100 MG/ML
5 INJECTION INTRAMUSCULAR ONCE
Refills: 0 | Status: DISCONTINUED | OUTPATIENT
Start: 2022-11-09 | End: 2022-11-29

## 2022-11-09 RX ORDER — SERTRALINE 25 MG/1
25 TABLET, FILM COATED ORAL DAILY
Refills: 0 | Status: DISCONTINUED | OUTPATIENT
Start: 2022-11-09 | End: 2022-11-11

## 2022-11-09 RX ORDER — ALBUTEROL 90 UG/1
2 AEROSOL, METERED ORAL EVERY 6 HOURS
Refills: 0 | Status: DISCONTINUED | OUTPATIENT
Start: 2022-11-09 | End: 2022-11-29

## 2022-11-09 RX ORDER — HALOPERIDOL DECANOATE 100 MG/ML
5 INJECTION INTRAMUSCULAR EVERY 6 HOURS
Refills: 0 | Status: DISCONTINUED | OUTPATIENT
Start: 2022-11-09 | End: 2022-11-29

## 2022-11-09 RX ORDER — HYDROXYZINE HCL 10 MG
50 TABLET ORAL EVERY 6 HOURS
Refills: 0 | Status: DISCONTINUED | OUTPATIENT
Start: 2022-11-09 | End: 2022-11-29

## 2022-11-09 RX ORDER — ESCITALOPRAM OXALATE 10 MG/1
20 TABLET, FILM COATED ORAL DAILY
Refills: 0 | Status: DISCONTINUED | OUTPATIENT
Start: 2022-11-09 | End: 2022-11-09

## 2022-11-09 RX ORDER — PANTOPRAZOLE SODIUM 20 MG/1
40 TABLET, DELAYED RELEASE ORAL ONCE
Refills: 0 | Status: COMPLETED | OUTPATIENT
Start: 2022-11-09 | End: 2022-11-09

## 2022-11-09 RX ORDER — DIPHENHYDRAMINE HCL 50 MG
50 CAPSULE ORAL ONCE
Refills: 0 | Status: DISCONTINUED | OUTPATIENT
Start: 2022-11-09 | End: 2022-11-29

## 2022-11-09 RX ORDER — INFLUENZA VIRUS VACCINE 15; 15; 15; 15 UG/.5ML; UG/.5ML; UG/.5ML; UG/.5ML
0.5 SUSPENSION INTRAMUSCULAR ONCE
Refills: 0 | Status: DISCONTINUED | OUTPATIENT
Start: 2022-11-09 | End: 2022-11-29

## 2022-11-09 RX ADMIN — PANTOPRAZOLE SODIUM 40 MILLIGRAM(S): 20 TABLET, DELAYED RELEASE ORAL at 12:30

## 2022-11-09 RX ADMIN — Medication 1 MILLIGRAM(S): at 21:10

## 2022-11-09 RX ADMIN — Medication 2 MILLIGRAM(S): at 17:12

## 2022-11-09 NOTE — BH INPATIENT PSYCHIATRY ASSESSMENT NOTE - HPI (INCLUDE ILLNESS QUALITY, SEVERITY, DURATION, TIMING, CONTEXT, MODIFYING FACTORS, ASSOCIATED SIGNS AND SYMPTOMS)
32-year-old  male unemployed living with spouse and her family, with diagnosis of MDD with history of one prior psychiatric hospitalization at [facility] from 7/21-7/29/2022 for suicidal thoughts, treated at [facility] since Aug 2022, currently on Lexapro, Ativan, and Propranolol, no history of self-injurious behavior or suicide attempts, no h/o violence or legal issues, PMH GERD and Asthma, no h/o substance abuse, referred by [facility] providers for SI and worsening depression/anxiety.     Collateral from [facility] SW, Sera Escamilla: "He had been doing ok managing his symptoms compliant with treatment until about two weeks ago. He began to feel more anxious with an increase in somatic complaints. He reported feeling more easily overwhelmed and having difficulty taking care of ADLs spending more time in his room isolating. He had some increased stress surrounding his birthday last week turning 32 and his second wedding anniversary. He mentioned some new concerns about his gender identity. He had difficulty enjoying the plans made to celebrate and did not follow through on some of them, telling his wife that he just wasn't  up to going out.. He reported an increase in pain related to stomach issue and became more hyper focused on these complaints. He reached out for additional support several times in between session. He denied any suicidal thoughts but was feeling more hopeless and helpless last week.   Today pt reaches out reporting suicidal thoughts with new complaint of increased pain " in my esophagus". He reports pain to be unmanageable and he "can't take it anymore". He reported suicidal thoughts of " taking my army knife that I have here and stabbing myself" or overdose on medications. He was tearful and guilty about how he was feeling stating "I know I'm letting people down" but he was uncertain if after our zoom session he would be able to keep himself safe and not act on these thoughts. He agreed to ask a family member to take him to the ER and was requesting admission. He agreed to not act on any thoughts.  He is on prescribed medications as follows: Lexapro 20 MG, TAB, PO, Take one (1) tablet by mouth daily  LORazepam 0.5 MG, TAB, PO, Take one (1) tablet by mouth three times a day  Propranolol HCl 20 MG, TAB, PO, Take one (1) tablet by mouth three times a day  Protonix 40 MG, TCP, PO, Take one (1) tablet by mouth every morning, before meal"    On interview, pt states he was referred to ED by [facility] providers because "I don't feel safe." Pt reports worsening depression and anxiety over the past few weeks. He cites several stressors, including turning 32 years old last week, as well as questioning his "identity" (pt declined to disclose details while in the hallway in the medical ED), and persistent chest tightness and "burning." Pt acknowledges that his "stress" could be contributing to his chest discomfort. Of note, pt medically cleared by ED providers. Pt reports being unemployed since July 2022. States he has been spending most of the day in bed, with low energy and low motivation. He reports poor concentration, sometimes has interrupted sleep. He sometimes goes 1-2 days without eating because he is afraid eating will worsen his chest burning/tightness. He reports anhedonia and sometimes feels hopeless. For the past few days, pt has been experiencing SI with thoughts of starving himself or stabbing himself with a knife at home. Pt states he fears that he will act on it at home. He denies manic or psychotic symptoms. He denies homicidal or violent ideation, intent, or plan. His Ativan and Propranolol are prescribed as TID, but he has only been taking these meds in the morning with his other morning meds (Lexapro and Pantoprazole). States he had a drink on his 32th birthday last week, but otherwise he hasn't been drinking alcohol. States he hasn't used marijuana in about 1 year. He denies other substance use.

## 2022-11-09 NOTE — BH INPATIENT PSYCHIATRY ASSESSMENT NOTE - ATTENDING COMMENTS
Agree with above. Acute depressive episode, somatic preoccupations, possible OCD Agree with above. Acute depressive episode, somatic preoccupations, possible OCD, plan to transition from escitalopram to sertraline to allow titration. May benefit from trial of gabapentin or doxazosin for PTSD though the latter would need to be coordinated with propranolol treatment to avoid hypotension. Gabapentin may also help transition away from lorazepam.

## 2022-11-09 NOTE — BH INPATIENT PSYCHIATRY ASSESSMENT NOTE - NSBHASSESSSUMMFT_PSY_ALL_CORE
32-year-old  male unemployed living with spouse and her family, with diagnosis of MDD with history of one prior psychiatric hospitalization at [facility] from 7/21-7/29/2022 for suicidal thoughts, treated at [facility] since Aug 2022, currently on Lexapro, Ativan, and Propranolol, no history of self-injurious behavior or suicide attempts, no h/o violence or legal issues, PMH GERD and Asthma, no h/o substance abuse, referred by [facility] providers for SI and worsening depression/anxiety.     Pt. presents with severe anxiety and panic attacks, w/ comorbid depression and SI w/ plan. The pt. reported plan to starve himself or stab himself at home. He denies plan or intent or to harm himself on the unit as he feels "safe." He is able to contract for safety. The patient's anxiety presents as future oriented, with ruminations, perseveration, intrusiveness and somatization. The pt. agrees with possible OCD diagnosis. He is agreeable to cross titration from lexapro to Zoloft for OCD-like Sx and depression. Will also titrate Ativan for acute anxiety relief. Continue all other home medications.   Self-rating Survey to be completed tomorrow.     Medical: consult with hospitalist regarding reported GERD and esophageal blockage. Chest Xray was unremarkable, however, pt. reports Hx of EGD and esophageal dilation.     Plan:  Psychiatry:  Cross titration: Lexapro 15mg, Zoloft 25mg daily--Anxiety, MDD  Ativan 1mg TID--Anxiety  Propranolol 20mg TID--Anxiety    MEDICATIONS  (PRN):  albuterol    90 MICROgram(s) HFA Inhaler 2 Puff(s) Inhalation every 6 hours PRN asthma  diphenhydrAMINE Injectable 50 milliGRAM(s) IntraMuscular once PRN Agitation  haloperidol     Tablet 5 milliGRAM(s) Oral every 6 hours PRN Agitation  haloperidol    Injectable 5 milliGRAM(s) IntraMuscular once PRN Agitation  hydrOXYzine hydrochloride 50 milliGRAM(s) Oral every 6 hours PRN Anxiety  LORazepam     Tablet 2 milliGRAM(s) Oral every 6 hours PRN Agitation  LORazepam   Injectable 2 milliGRAM(s) IntraMuscular once PRN Agitation    Observation: routine, pt. is not an acute risk for suicide while inpatient; no indication for CO  Legal: 9.13 voluntary admisssion  Medical: Asthma--albuterol rescue inhaler  GERD--protonix 40mg daily  Routine medical followup; TSH, lipid panel and HA1C in the morning  Sx reduction, medication management, safety planning, milieu therapy.  Dispo: pending   32-year-old  male unemployed living with spouse and her family, with diagnosis of MDD with history of one prior psychiatric hospitalization at [facility] from 7/21-7/29/2022 for suicidal thoughts, treated at [facility] since Aug 2022, currently on Lexapro, Ativan, and Propranolol, no history of self-injurious behavior or suicide attempts, no h/o violence or legal issues, PMH GERD and Asthma, no h/o substance abuse, referred by [facility] providers for SI and worsening depression/anxiety.     Pt. presents with severe anxiety and panic attacks, w/ comorbid depression and SI w/ plan. The pt. reported plan to starve himself or stab himself at home. He denies plan or intent or to harm himself on the unit as he feels "safe." He is able to contract for safety. The patient's anxiety presents as future oriented, with ruminations, perseveration, intrusiveness and somatization. The pt. agrees with possible OCD diagnosis. He is agreeable to cross titration from lexapro to Zoloft for OCD-like Sx and depression. Will also titrate Ativan for acute anxiety relief. Continue all other home medications.   Self-rating Survey to be completed tomorrow.     Medical: consult with hospitalist regarding reported GERD and esophageal blockage. Chest Xray was unremarkable, however, pt. reports Hx of EGD and esophageal dilation. Possibly somatic without underlying etiology requiring intervention, unclear context of dilation.     Plan:  Psychiatry:  Cross titration: Lexapro 15mg, Zoloft 25mg daily--Anxiety, MDD  Ativan 1mg TID--Anxiety  Propranolol 20mg TID--Anxiety    MEDICATIONS  (PRN):  albuterol    90 MICROgram(s) HFA Inhaler 2 Puff(s) Inhalation every 6 hours PRN asthma  diphenhydrAMINE Injectable 50 milliGRAM(s) IntraMuscular once PRN Agitation  haloperidol     Tablet 5 milliGRAM(s) Oral every 6 hours PRN Agitation  haloperidol    Injectable 5 milliGRAM(s) IntraMuscular once PRN Agitation  hydrOXYzine hydrochloride 50 milliGRAM(s) Oral every 6 hours PRN Anxiety  LORazepam     Tablet 2 milliGRAM(s) Oral every 6 hours PRN Agitation  LORazepam   Injectable 2 milliGRAM(s) IntraMuscular once PRN Agitation    Observation: routine, pt. is not an acute risk for suicide while inpatient; no indication for CO  Legal: 9.13 voluntary admisssion  Medical: Asthma--albuterol rescue inhaler  GERD--protonix 40mg daily  Routine medical followup; TSH, lipid panel and HA1C in the morning  Sx reduction, medication management, safety planning, milieu therapy.  Dispo: pending

## 2022-11-09 NOTE — BH INPATIENT PSYCHIATRY ASSESSMENT NOTE - NSCOMMENTSUICRISKFACT_PSY_ALL_CORE
Anxiety, depression, PTSD, cluster b personality traits, chronic SI, prior hospitalization, psychosocial stressors

## 2022-11-09 NOTE — BH INPATIENT PSYCHIATRY ASSESSMENT NOTE - NSBHCHARTREVIEWVS_PSY_A_CORE FT
Vital Signs Last 24 Hrs  T(C): 36.8 (11-09-22 @ 13:46), Max: 36.9 (11-08-22 @ 20:47)  T(F): 98.3 (11-09-22 @ 13:46), Max: 98.5 (11-08-22 @ 20:47)  HR: 94 (11-09-22 @ 12:15) (70 - 94)  BP: 133/98 (11-09-22 @ 12:15) (131/90 - 134/90)  BP(mean): --  RR: 18 (11-09-22 @ 13:46) (16 - 18)  SpO2: 100% (11-09-22 @ 12:15) (99% - 100%)    Orthostatic VS  11-09-22 @ 13:46  Lying BP: --/-- HR: --  Sitting BP: 127/90 HR: 84  Standing BP: 131/84 HR: 94  Site: upper left arm  Mode: electronic   Vital Signs Last 24 Hrs  T(C): 36.6 (11-15-22 @ 06:11), Max: 36.7 (11-14-22 @ 14:24)  T(F): 97.9 (11-15-22 @ 06:11), Max: 98.1 (11-14-22 @ 14:24)  HR: --  BP: --  BP(mean): --  RR: 17 (11-15-22 @ 08:01) (17 - 17)  SpO2: --    Orthostatic VS  11-15-22 @ 08:01  Lying BP: --/-- HR: --  Sitting BP: 117/72 HR: 66  Standing BP: 119/80 HR: 87  Site: --  Mode: --  Orthostatic VS  11-14-22 @ 19:21  Lying BP: --/-- HR: --  Sitting BP: 116/69 HR: 69  Standing BP: 115/67 HR: 77  Site: --  Mode: --  Orthostatic VS  11-14-22 @ 08:13  Lying BP: --/-- HR: --  Sitting BP: 116/61 HR: 71  Standing BP: 110/67 HR: 88  Site: --  Mode: --  Orthostatic VS  11-13-22 @ 19:39  Lying BP: --/-- HR: --  Sitting BP: 101/68 HR: 82  Standing BP: 95/63 HR: --  Site: --  Mode: --

## 2022-11-09 NOTE — BH INPATIENT PSYCHIATRY ASSESSMENT NOTE - NSICDXBHSECONDARYDX_PSY_ALL_CORE
Severe episode of recurrent major depressive disorder, without psychotic features   F33.2   Post traumatic stress disorder (PTSD)   F43.10  Somatic preoccupation   F45.9  Severe episode of recurrent major depressive disorder, without psychotic features   F33.2

## 2022-11-09 NOTE — BH CONSULTATION LIAISON PROGRESS NOTE - NSBHATTESTCOMMENTATTENDFT_PSY_A_CORE
32-year-old  male unemployed living with spouse and her family, with diagnosis of MDD with history of one prior psychiatric hospitalization at [facility] from 7/21-7/29/2022 for suicidal thoughts, treated at [facility] since Aug 2022, currently on Lexapro, Ativan, and Propranolol, no history of self-injurious behavior or suicide attempts, no h/o violence or legal issues, PMH GERD and Asthma, no h/o substance abuse, referred by [facility] providers for SI and worsening depression/anxiety.    Pt continues to endorse depressed thoughts with SI with intent and plan to starve self/stab self with knife, requesting inpatient voluntary admission to psychiatric hospital.

## 2022-11-09 NOTE — BH INPATIENT PSYCHIATRY ASSESSMENT NOTE - NSBHMSEINTELL_PSY_A_CORE
H&P  has been reviewed.  The patient has been examined, I concur with the findings and no changes have occurred since H&P was written.  
Average

## 2022-11-09 NOTE — BH PATIENT PROFILE - STATED REASON FOR ADMISSION
"Everytime I eat it feels like I'm choking because of my GERD and it's causing me high anxiety and suicidal thoughts. I can't go on like this."

## 2022-11-09 NOTE — BH CONSULTATION LIAISON PROGRESS NOTE - NSBHCHARTREVIEWVS_PSY_A_CORE FT
Vital Signs Last 24 Hrs  T(C): 36.4 (09 Nov 2022 07:45), Max: 36.9 (08 Nov 2022 20:47)  T(F): 97.6 (09 Nov 2022 07:45), Max: 98.5 (08 Nov 2022 20:47)  HR: 90 (09 Nov 2022 07:45) (70 - 98)  BP: 134/90 (09 Nov 2022 07:45) (120/82 - 134/90)  BP(mean): --  RR: 16 (09 Nov 2022 07:45) (14 - 18)  SpO2: 100% (09 Nov 2022 07:45) (99% - 100%)    Parameters below as of 09 Nov 2022 07:45  Patient On (Oxygen Delivery Method): room air

## 2022-11-09 NOTE — BH INPATIENT PSYCHIATRY ASSESSMENT NOTE - NSBHMETABOLIC_PSY_ALL_CORE_FT
BMI: BMI (kg/m2): 39.5 (11-09-22 @ 13:46)  HbA1c: A1C with Estimated Average Glucose Result: 5.1 % (07-22-22 @ 08:45)    Glucose: POCT Blood Glucose.: 101 mg/dL (12-15-21 @ 00:29)    BP: 133/98 (11-09-22 @ 12:15) (120/82 - 134/90)  Lipid Panel: Date/Time: 07-22-22 @ 08:45  Cholesterol, Serum: 192  Direct LDL: --  HDL Cholesterol, Serum: 32  Total Cholesterol/HDL Ration Measurement: --  Triglycerides, Serum: 63   BMI: BMI (kg/m2): 39.5 (11-09-22 @ 13:46)  HbA1c: A1C with Estimated Average Glucose Result: 4.9 % (11-10-22 @ 08:00)    Glucose: POCT Blood Glucose.: 101 mg/dL (12-15-21 @ 00:29)    BP: 109/76 (11-12-22 @ 13:11) (109/76 - 109/76)  Lipid Panel: Date/Time: 11-10-22 @ 08:00  Cholesterol, Serum: 209  Direct LDL: --  HDL Cholesterol, Serum: 26  Total Cholesterol/HDL Ration Measurement: --  Triglycerides, Serum: 105

## 2022-11-09 NOTE — BH PATIENT PROFILE - NSNUTRITIONASSESS_GEN_ALL_CORE
Eating habits that you, your loved ones or caregiver are concerned about/Teeth, gums or other dental issues

## 2022-11-09 NOTE — BH CONSULTATION LIAISON PROGRESS NOTE - NSBHASSESSMENTFT_PSY_ALL_CORE
32-year-old  male unemployed living with spouse and her family, with diagnosis of MDD with history of one prior psychiatric hospitalization at [facility] from 7/21-7/29/2022 for suicidal thoughts, treated at [facility] since Aug 2022, currently on Lexapro, Ativan, and Propranolol, no history of self-injurious behavior or suicide attempts, no h/o violence or legal issues, PMH GERD and Asthma, no h/o substance abuse, referred by [facility] providers for SI and worsening depression/anxiety.    Pt continues to endorse depressed thoughts with SI with intent and plan to starve self/stab self with knife, requesting inpatient voluntary admission to psychiatric hospital.     Plan  -admit to L4 at [facility] on 9.13  -continue home Lexapro 20 mg po daily, Propranolol 20 mg po daily, Ativan 0.5 mg po daily, Pantoprazole 40 mg po daily; defer standing medication changes to primary inpatient psychiatric team

## 2022-11-09 NOTE — BH PATIENT PROFILE - LAST ORAL INTAKE
Received message from Jace SMITH with [employer]. [phone] who indicated she can assist with any discharge needs.  Lamar Goncalves, MSW, [facility]- [phone] 09-Nov-2022

## 2022-11-09 NOTE — BH INPATIENT PSYCHIATRY ASSESSMENT NOTE - NSICDXBHPRIMARYDX_PSY_ALL_CORE
Generalized anxiety disorder with panic attacks   F41.1   Severe episode of recurrent major depressive disorder, without psychotic features   F33.2

## 2022-11-09 NOTE — BH INPATIENT PSYCHIATRY ASSESSMENT NOTE - NSICDXBHTERTIARYDX_PSY_ALL_CORE
R/O OCD (obsessive compulsive disorder)   F42.9   R/O OCD (obsessive compulsive disorder)   F42.9  R/O Panic disorder   F41.0

## 2022-11-09 NOTE — BH CONSULTATION LIAISON PROGRESS NOTE - NSBHFUPINTERVALHXFT_PSY_A_CORE
No acute overnight events. Pt states that he slept okay overnight and ate part of his breakfast this morning, eating for the first time in a couple days. Pt's mood continues to be low, with pt spending a large portion of the night reflecting on his life stressors. Although pt is feeling safe currently, he continues to endorse suicidal thoughts with plan to starve himself or stab himself with a knife. No HI. Denies AVH.

## 2022-11-09 NOTE — ED ADULT NURSE REASSESSMENT NOTE - NS ED NURSE REASSESS COMMENT FT1
Pt appears to be sleeping peacefully, resp even unlabored, visualized chest rise, appears in no obv distress at this time.

## 2022-11-09 NOTE — BH PATIENT PROFILE - HOME MEDICATIONS
propranolol 20 mg oral tablet , 1 tab(s) orally 3 times a day  Protonix 40 mg oral delayed release tablet , 1 tab(s) orally once a day (before a meal)  LORazepam 0.5 mg oral tablet , 1 tab(s) orally 3 times a day MDD:1.5mg  escitalopram 20 mg oral tablet , 1 tab(s) orally once a day  albuterol 90 mcg/inh inhalation aerosol , 2 puff(s) inhaled every 6 hours, As needed, asthma

## 2022-11-09 NOTE — BH INPATIENT PSYCHIATRY ASSESSMENT NOTE - DETAILS
see HPI Pt reports father's side with alcohol abuse, and reports mother with bipolar disorder, OCD, and DID.

## 2022-11-09 NOTE — BH INPATIENT PSYCHIATRY ASSESSMENT NOTE - CURRENT MEDICATION
MEDICATIONS  (STANDING):  escitalopram 15 milliGRAM(s) Oral daily  influenza   Vaccine 0.5 milliLiter(s) IntraMuscular once  LORazepam     Tablet 1 milliGRAM(s) Oral three times a day  pantoprazole    Tablet 40 milliGRAM(s) Oral before breakfast  propranolol 20 milliGRAM(s) Oral three times a day  sertraline 25 milliGRAM(s) Oral daily    MEDICATIONS  (PRN):  albuterol    90 MICROgram(s) HFA Inhaler 2 Puff(s) Inhalation every 6 hours PRN asthma  diphenhydrAMINE Injectable 50 milliGRAM(s) IntraMuscular once PRN Agitation  haloperidol     Tablet 5 milliGRAM(s) Oral every 6 hours PRN Agitation  haloperidol    Injectable 5 milliGRAM(s) IntraMuscular once PRN Agitation  hydrOXYzine hydrochloride 50 milliGRAM(s) Oral every 6 hours PRN Anxiety  LORazepam     Tablet 2 milliGRAM(s) Oral every 6 hours PRN Agitation  LORazepam   Injectable 2 milliGRAM(s) IntraMuscular once PRN Agitation   MEDICATIONS  (STANDING):  influenza   Vaccine 0.5 milliLiter(s) IntraMuscular once  LORazepam     Tablet 1 milliGRAM(s) Oral three times a day  pantoprazole    Tablet 40 milliGRAM(s) Oral before breakfast  propranolol 20 milliGRAM(s) Oral three times a day  sertraline 125 milliGRAM(s) Oral daily    MEDICATIONS  (PRN):  albuterol    90 MICROgram(s) HFA Inhaler 2 Puff(s) Inhalation every 6 hours PRN asthma  diphenhydrAMINE Injectable 50 milliGRAM(s) IntraMuscular once PRN Agitation  haloperidol     Tablet 5 milliGRAM(s) Oral every 6 hours PRN Agitation  haloperidol    Injectable 5 milliGRAM(s) IntraMuscular once PRN Agitation  hydrOXYzine hydrochloride 50 milliGRAM(s) Oral every 6 hours PRN Anxiety  LORazepam     Tablet 2 milliGRAM(s) Oral every 6 hours PRN Agitation  LORazepam   Injectable 2 milliGRAM(s) IntraMuscular once PRN Agitation

## 2022-11-10 LAB
A1C WITH ESTIMATED AVERAGE GLUCOSE RESULT: 4.9 % — SIGNIFICANT CHANGE UP (ref 4–5.6)
CHOLEST SERPL-MCNC: 209 MG/DL — HIGH
ESTIMATED AVERAGE GLUCOSE: 94 — SIGNIFICANT CHANGE UP
HDLC SERPL-MCNC: 26 MG/DL — LOW
LIPID PNL WITH DIRECT LDL SERPL: 162 MG/DL — HIGH
NON HDL CHOLESTEROL: 183 MG/DL — HIGH
TRIGL SERPL-MCNC: 105 MG/DL — SIGNIFICANT CHANGE UP
TSH SERPL-MCNC: 1.57 UIU/ML — SIGNIFICANT CHANGE UP (ref 0.27–4.2)

## 2022-11-10 RX ADMIN — Medication 1 MILLIGRAM(S): at 20:22

## 2022-11-10 RX ADMIN — PANTOPRAZOLE SODIUM 40 MILLIGRAM(S): 20 TABLET, DELAYED RELEASE ORAL at 08:57

## 2022-11-10 RX ADMIN — Medication 1 MILLIGRAM(S): at 12:52

## 2022-11-10 RX ADMIN — Medication 1 MILLIGRAM(S): at 08:56

## 2022-11-10 RX ADMIN — ESCITALOPRAM OXALATE 15 MILLIGRAM(S): 10 TABLET, FILM COATED ORAL at 08:56

## 2022-11-10 RX ADMIN — SERTRALINE 25 MILLIGRAM(S): 25 TABLET, FILM COATED ORAL at 08:56

## 2022-11-10 NOTE — BH PSYCHOLOGY - CLINICIAN PSYCHOTHERAPY NOTE - NSBHPSYCHOLNARRATIVE_PSY_A_CORE FT
Writer and patient wore masks during the session due to COVID precautions. Pt was alert and appeared to have adequate hygiene and grooming. Pt denied any current suicidal ideation, intent, or plan, and did not endorse any homicidal ideation. Pt reported "mix of hopeful and disgusted" mood and displayed anxious affect. Speech within normal limits. Thought processes linear and goal directed. Pt denied any auditory or visual hallucinations. Oriented x3. Parameters of treatment and limitations of confidentiality were discussed. Pt demonstrated fair insight and judgment.     Writer provided orientation to unit, which included welcoming pt, explaining purpose of psychiatric unit, and assessing pt's goals for time on unit. Pt identified learning to cope with "long-term anxiety and chronic illness" as his primary goals. Pt endorsed ruminative thinking regarding somatic complaints (e.g., "you need to fix this"). Pt reported that skills and outpatient therapy have not been sufficiently helpful. Pt endorsed belief that hospitalization may assist by providing safe space to adjust medication. Writer provided psychoeducation regarding the value of a medication + therapy approach. Support, validation, and empathic listening provided.

## 2022-11-10 NOTE — PSYCHIATRIC REHAB INITIAL EVALUATION - NSBHPRRECOMMEND_PSY_ALL_CORE
Patient is a 32 year old male,  and domiciled with wife and her family, currently employed at [employer] but has been endorsing difficulties going into work recently, with a prior psychiatric history of anxiety since childhood. Patient has one prior hospitalization in 7/2022, also for anxiety, depression and SI.   Patient reports a medical history of GERD, gastritis (states potentially in response to anxiety), and asthma. Patient reports that he used to use marijuana heavily for 1 year last 1 year ago, and social drinking on special occasions.   He describes a loss of appetite, poor sleep, and multiple somatic sensations primarily GI during the last 3 weeks. Patient reports often experiencing physical manifestations of anxiety through his GI system. Writer and pt. establish a collaborative treatment goal focused on pt. anxiety and how to cope.    Patient is a 32 year old male,  and domiciled with wife and her family, unemployed,  with a prior psychiatric history of anxiety since childhood. Patient has one prior hospitalization in 7/2022, also for anxiety, depression and SI.   Patient reports a medical history of GERD, gastritis (states potentially in response to anxiety), and asthma. Patient reports that he used to use marijuana heavily for 1 year last 1 year ago, and social drinking on special occasions.   He describes a loss of appetite, poor sleep, and multiple somatic sensations primarily GI during the last 3 weeks.

## 2022-11-10 NOTE — BH INPATIENT PSYCHIATRY PROGRESS NOTE - NSBHFUPINTERVALHXFT_PSY_A_CORE
Follow up for patient with RICKIE. Case reviewed with team, VSS, labs reviewed. No acute events overnight. Patient reports he was able to sleep well and has been eating. Denies AH/VH/HI. Endorses fleeting passive SI, and notes that he does not want to die but endorses that it is more of an escape from feeling sick. He is surprised at his positive feelings stating "just the other day I felt there was no hope" and now has hopeful outlook and believes that he can get better. Reports that he still has the feeling of a lump in his throat, however it has lessened and is no longer causing him distress and feels comfortable eating.   Notes that he feels tired even though he slept, however according to patient the tiredness is "not in a bad sense". He attributes the feeling to still adjusting to the unit and medications. The tiredness is preventing him from going to group therapy but hopes to go to groups in the afternoon.  Follow up for patient with MDD with somatic preoccupations. Case reviewed with team, VSS, labs reviewed. No acute events overnight. Patient reports he was able to sleep well and has been eating. Denies AH/VH/HI. Endorses fleeting passive SI though he reports he does not want to die but endorses that it is more of an escape from feeling sick. He is surprised at his positive feelings stating "just the other day I felt there was no hope" and now has hopeful outlook and believes that he can get better. Reports that he still has the feeling of a lump in his throat, however it has lessened and is no longer causing him distress and feels comfortable eating. On discussion regarding esophageal stricture dilation history somewhat unclear whether there was an underlying anatomical process. Discussed panic-type somatic feedback loop. Reports significant trauma history from childhood, confirms ongoing PTSD symptoms. Discussed pattern of social anxiety and rigidity, possible contribution to other conditions. Unable to elicit significant manic symptoms.   Notes that he feels tired even though he slept, however according to patient the tiredness is "not in a bad sense". He attributes the feeling to still adjusting to the unit and medications. The tiredness is preventing him from going to group therapy but hopes to go to groups in the afternoon.

## 2022-11-10 NOTE — BH SOCIAL WORK INITIAL PSYCHOSOCIAL EVALUATION - DETAILS
Pt reports father's side with alcohol abuse, and reports mother with bipolar disorder, OCD, and DID. Alcoholism

## 2022-11-10 NOTE — BH INPATIENT PSYCHIATRY PROGRESS NOTE - NSICDXBHSECONDARYDX_PSY_ALL_CORE
Severe episode of recurrent major depressive disorder, without psychotic features   F33.2   Severe episode of recurrent major depressive disorder, without psychotic features   F33.2  Post traumatic stress disorder (PTSD)   F43.10  Somatic preoccupation   F45.9

## 2022-11-10 NOTE — BH INPATIENT PSYCHIATRY PROGRESS NOTE - NSBHMETABOLIC_PSY_ALL_CORE_FT
BMI: BMI (kg/m2): 39.5 (11-09-22 @ 13:46)  HbA1c: A1C with Estimated Average Glucose Result: 4.9 % (11-10-22 @ 08:00)    Glucose: POCT Blood Glucose.: 101 mg/dL (12-15-21 @ 00:29)    BP: 114/74 (11-10-22 @ 12:50) (114/74 - 134/90)  Lipid Panel: Date/Time: 11-10-22 @ 08:00  Cholesterol, Serum: 209  Direct LDL: --  HDL Cholesterol, Serum: 26  Total Cholesterol/HDL Ration Measurement: --  Triglycerides, Serum: 105

## 2022-11-10 NOTE — DIETITIAN INITIAL EVALUATION ADULT - PERTINENT MEDS FT
MEDICATIONS  (STANDING):  escitalopram 15 milliGRAM(s) Oral daily  influenza   Vaccine 0.5 milliLiter(s) IntraMuscular once  LORazepam     Tablet 1 milliGRAM(s) Oral three times a day  pantoprazole    Tablet 40 milliGRAM(s) Oral before breakfast  propranolol 20 milliGRAM(s) Oral three times a day  sertraline 25 milliGRAM(s) Oral daily    MEDICATIONS  (PRN):  albuterol    90 MICROgram(s) HFA Inhaler 2 Puff(s) Inhalation every 6 hours PRN asthma  diphenhydrAMINE Injectable 50 milliGRAM(s) IntraMuscular once PRN Agitation  haloperidol     Tablet 5 milliGRAM(s) Oral every 6 hours PRN Agitation  haloperidol    Injectable 5 milliGRAM(s) IntraMuscular once PRN Agitation  hydrOXYzine hydrochloride 50 milliGRAM(s) Oral every 6 hours PRN Anxiety  LORazepam     Tablet 2 milliGRAM(s) Oral every 6 hours PRN Agitation  LORazepam   Injectable 2 milliGRAM(s) IntraMuscular once PRN Agitation

## 2022-11-10 NOTE — BH INPATIENT PSYCHIATRY PROGRESS NOTE - NSBHASSESSSUMMFT_PSY_ALL_CORE
32-year-old  male unemployed living with spouse and her family, with diagnosis of MDD with history of one prior psychiatric hospitalization at [facility] from 7/21-7/29/2022 for suicidal thoughts, [facility] Aug [facility], currently on Lexapro, Ativan, and Propranolol, no history of self-injurious behavior or suicide attempts, no h/o violence or legal issues, PMH GERD and Asthma, no h/o substance abuse, referred by [facility] providers for SI and worsening depression/anxiety.     Pt. presents with attenuating mood and anxiety symptoms. Ruminating thoughts have decreased as well as somatic GI sx, however still present. Concerned about transient improvement in mood d/t hospitalization and will monitor for mood and anxiety symptom fluctuations. Will continue to monitor fatigue symptoms. Plan for slow cross titration of SSRI medications. Maintain current dosage for now and will reevaluate in tomorrow.       Plan:     Psychiatry:  Cross titration: Lexapro 15mg, Zoloft 25mg daily--Anxiety, MDD  Ativan 1mg TID--Anxiety  Propranolol 20mg TID--Anxiety    MEDICATIONS  (PRN):  albuterol    90 MICROgram(s) HFA Inhaler 2 Puff(s) Inhalation every 6 hours PRN asthma  diphenhydrAMINE Injectable 50 milliGRAM(s) IntraMuscular once PRN Agitation  haloperidol     Tablet 5 milliGRAM(s) Oral every 6 hours PRN Agitation  haloperidol    Injectable 5 milliGRAM(s) IntraMuscular once PRN Agitation  hydrOXYzine hydrochloride 50 milliGRAM(s) Oral every 6 hours PRN Anxiety  LORazepam     Tablet 2 milliGRAM(s) Oral every 6 hours PRN Agitation  LORazepam   Injectable 2 milliGRAM(s) IntraMuscular once PRN Agitation    Observation: routine, pt. is not an acute risk for suicide while inpatient; no indication for CO    Legal: 9.13 voluntary admission    Medical: Asthma--albuterol rescue inhaler  GERD--protonix 40mg daily  Routine medical followup; TSH, lipid panel and HA1C in the morning  Sx reduction, medication management, safety planning, milieu therapy.  Medical: consult with hospitalist regarding reported GERD and esophageal blockage. Chest Xray was unremarkable, however, pt. reports Hx of EGD and esophageal dilation.     Dispo: pending   32-year-old  male unemployed living with spouse and her family, with diagnosis of MDD with history of one prior psychiatric hospitalization at [facility] from 7/21-7/29/2022 for suicidal thoughts, treated [facility] since Aug 2022, currently on Lexapro, Ativan, and Propranolol, no history of self-injurious behavior or suicide attempts, no h/o violence or legal issues, PMH GERD and Asthma, no h/o substance abuse, referred by [facility] providers for SI and worsening depression/anxiety.     Pt. presents with attenuating mood and anxiety symptoms. Ruminating thoughts have decreased as well as somatic GI sx, however still present, reality testing, less intrusive. Concerned about transient improvement in mood d/t hospitalization and will monitor for mood and anxiety symptom fluctuations. Will continue to monitor fatigue symptoms. Cross titration of SSRI medications. Maintain current dosage for now and will reevaluate in tomorrow. Considering possible trial of gabapentin for PTSD/anxiety.       Plan:     Psychiatry:  Cross titration: Lexapro 15mg, Zoloft 25mg daily--Anxiety, MDD  Ativan 1mg TID--Anxiety  Propranolol 20mg TID--Anxiety    MEDICATIONS  (PRN):  albuterol    90 MICROgram(s) HFA Inhaler 2 Puff(s) Inhalation every 6 hours PRN asthma  diphenhydrAMINE Injectable 50 milliGRAM(s) IntraMuscular once PRN Agitation  haloperidol     Tablet 5 milliGRAM(s) Oral every 6 hours PRN Agitation  haloperidol    Injectable 5 milliGRAM(s) IntraMuscular once PRN Agitation  hydrOXYzine hydrochloride 50 milliGRAM(s) Oral every 6 hours PRN Anxiety  LORazepam     Tablet 2 milliGRAM(s) Oral every 6 hours PRN Agitation  LORazepam   Injectable 2 milliGRAM(s) IntraMuscular once PRN Agitation    Observation: routine, pt. is not an acute risk for suicide while inpatient; no indication for CO    Legal: 9.13 voluntary admission    Medical: Asthma--albuterol rescue inhaler  GERD--protonix 40mg daily  Routine medical followup; TSH, lipid panel and HA1C in the morning  Sx reduction, medication management, safety planning, milieu therapy.  Medical: consult with hospitalist regarding reported GERD and esophageal blockage. Chest Xray was unremarkable, however, pt. reports Hx of EGD and esophageal dilation.     Dispo: pending

## 2022-11-10 NOTE — BH INPATIENT PSYCHIATRY PROGRESS NOTE - NSICDXBHPRIMARYDX_PSY_ALL_CORE
Generalized anxiety disorder with panic attacks   F41.1   Major depressive disorder, single episode with anxious distress   F32.9
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
## 2022-11-10 NOTE — BH SOCIAL WORK INITIAL PSYCHOSOCIAL EVALUATION - NSPTSTATEDGOAL_PSY_ALL_CORE
"I am not feeling stable because of my anxiety, I need to feel better," "to stabilize my mental health"

## 2022-11-10 NOTE — BH INPATIENT PSYCHIATRY PROGRESS NOTE - NSBHCHARTREVIEWVS_PSY_A_CORE FT
Vital Signs Last 24 Hrs  T(C): 36.3 (11-10-22 @ 14:34), Max: 36.3 (11-09-22 @ 19:25)  T(F): 97.3 (11-10-22 @ 14:34), Max: 97.4 (11-09-22 @ 19:25)  HR: 77 (11-10-22 @ 12:50) (77 - 77)  BP: 114/74 (11-10-22 @ 12:50) (114/74 - 114/74)  BP(mean): --  RR: 17 (11-10-22 @ 12:50) (17 - 17)  SpO2: --    Orthostatic VS  11-10-22 @ 08:35  Lying BP: --/-- HR: --  Sitting BP: 119/75 HR: 74  Standing BP: --/-- HR: --  Site: --  Mode: --  Orthostatic VS  11-09-22 @ 19:25  Lying BP: --/-- HR: --  Sitting BP: 134/91 HR: 88  Standing BP: 130/96 HR: 91  Site: --  Mode: --  Orthostatic VS  11-09-22 @ 13:46  Lying BP: --/-- HR: --  Sitting BP: 127/90 HR: 84  Standing BP: 131/84 HR: 94  Site: upper left arm  Mode: electronic   Vital Signs Last 24 Hrs  T(C): 36.4 (11-11-22 @ 06:25), Max: 36.4 (11-11-22 @ 06:25)  T(F): 97.5 (11-11-22 @ 06:25), Max: 97.5 (11-11-22 @ 06:25)  HR: 77 (11-10-22 @ 12:50) (77 - 77)  BP: 114/74 (11-10-22 @ 12:50) (114/74 - 114/74)  BP(mean): --  RR: 17 (11-11-22 @ 08:58) (17 - 17)  SpO2: --    Orthostatic VS  11-11-22 @ 08:07  Lying BP: --/-- HR: --  Sitting BP: 119/72 HR: 66  Standing BP: 111/77 HR: 75  Site: upper left arm  Mode: electronic  Orthostatic VS  11-10-22 @ 19:13  Lying BP: --/-- HR: --  Sitting BP: 116/65 HR: 65  Standing BP: 110/64 HR: 73  Site: --  Mode: --  Orthostatic VS  11-10-22 @ 08:35  Lying BP: --/-- HR: --  Sitting BP: 119/75 HR: 74  Standing BP: --/-- HR: --  Site: --  Mode: --  Orthostatic VS  11-09-22 @ 19:25  Lying BP: --/-- HR: --  Sitting BP: 134/91 HR: 88  Standing BP: 130/96 HR: 91  Site: --  Mode: --  Orthostatic VS  11-09-22 @ 13:46  Lying BP: --/-- HR: --  Sitting BP: 127/90 HR: 84  Standing BP: 131/84 HR: 94  Site: upper left arm  Mode: electronic

## 2022-11-10 NOTE — DIETITIAN INITIAL EVALUATION ADULT - OTHER INFO
Patient admitted to [facility] d/t worsening depression/anxiety and SI; hx: MDD, GERD; esophageal stricture. Spoke to patient outside on outdoor patio. Patient reports appetite is "a little better". Patient states at times he has chest burning/tightness and increase pain in esophagus (hx: GERD; on Protonix). Denies n/v/d/c. Discussed plain, bland foods and omitting spicy foods, caffeine, and citrus from diet which patient states he does follow. Food preferences obtained. Patient endorses weight gain from being inactive recently. Discussed healthy diet, portion control, and increasing physical activity for weight management.  Patient admitted to [facility] d/t worsening depression/anxiety and SI; hx: MDD, GERD; esophageal stricture. Spoke to patient outside on outdoor patio. Patient reports appetite is "a little better". Patient states at times he has chest burning/tightness and increase pain in esophagus (hx: GERD; on Protonix). Denies n/v/d/c. Denies any difficulty chewing. Discussed plain, bland foods and omitting spicy foods, caffeine, and citrus from diet which patient states he does follow. Food preferences obtained. Patient endorses weight gain from being inactive recently. Discussed healthy diet, portion control, and increasing physical activity for weight management.

## 2022-11-10 NOTE — BH INPATIENT PSYCHIATRY PROGRESS NOTE - PRN MEDS
MEDICATIONS  (PRN):  albuterol    90 MICROgram(s) HFA Inhaler 2 Puff(s) Inhalation every 6 hours PRN asthma  diphenhydrAMINE Injectable 50 milliGRAM(s) IntraMuscular once PRN Agitation  haloperidol     Tablet 5 milliGRAM(s) Oral every 6 hours PRN Agitation  haloperidol    Injectable 5 milliGRAM(s) IntraMuscular once PRN Agitation  hydrOXYzine hydrochloride 50 milliGRAM(s) Oral every 6 hours PRN Anxiety  LORazepam     Tablet 2 milliGRAM(s) Oral every 6 hours PRN Agitation  LORazepam   Injectable 2 milliGRAM(s) IntraMuscular once PRN Agitation

## 2022-11-10 NOTE — DIETITIAN INITIAL EVALUATION ADULT - PERTINENT LABORATORY DATA
11-08    140  |  101  |  14  ----------------------------<  86  4.3   |  25  |  0.99    Ca    9.6      08 Nov 2022 13:12    TPro  7.6  /  Alb  4.6  /  TBili  0.7  /  DBili  x   /  AST  19  /  ALT  23  /  AlkPhos  86  11-08  A1C with Estimated Average Glucose Result: 4.9 % (11-10-22 @ 08:00)  A1C with Estimated Average Glucose Result: 5.1 % (07-22-22 @ 08:45)  A1C with Estimated Average Glucose Result: 4.9 % (12-15-21 @ 04:04)

## 2022-11-11 DIAGNOSIS — F43.10 POST-TRAUMATIC STRESS DISORDER, UNSPECIFIED: ICD-10-CM

## 2022-11-11 DIAGNOSIS — F45.9 SOMATOFORM DISORDER, UNSPECIFIED: ICD-10-CM

## 2022-11-11 RX ORDER — SERTRALINE 25 MG/1
50 TABLET, FILM COATED ORAL DAILY
Refills: 0 | Status: DISCONTINUED | OUTPATIENT
Start: 2022-11-11 | End: 2022-11-12

## 2022-11-11 RX ORDER — ESCITALOPRAM OXALATE 10 MG/1
10 TABLET, FILM COATED ORAL DAILY
Refills: 0 | Status: DISCONTINUED | OUTPATIENT
Start: 2022-11-11 | End: 2022-11-12

## 2022-11-11 RX ADMIN — Medication 1 MILLIGRAM(S): at 09:05

## 2022-11-11 RX ADMIN — SERTRALINE 25 MILLIGRAM(S): 25 TABLET, FILM COATED ORAL at 09:05

## 2022-11-11 RX ADMIN — PANTOPRAZOLE SODIUM 40 MILLIGRAM(S): 20 TABLET, DELAYED RELEASE ORAL at 09:05

## 2022-11-11 RX ADMIN — ESCITALOPRAM OXALATE 15 MILLIGRAM(S): 10 TABLET, FILM COATED ORAL at 09:05

## 2022-11-11 RX ADMIN — Medication 1 MILLIGRAM(S): at 12:19

## 2022-11-11 RX ADMIN — Medication 1 MILLIGRAM(S): at 20:47

## 2022-11-11 NOTE — BH INPATIENT PSYCHIATRY PROGRESS NOTE - NSBHASSESSSUMMFT_PSY_ALL_CORE
32-year-old  male unemployed living with spouse and her family, with diagnosis of MDD with history of one prior psychiatric hospitalization at [facility] from 7/21-7/29/2022 for suicidal thoughts, treated at [facility] since Aug 2022, currently on Lexapro, Ativan, and Propranolol, no history of self-injurious behavior or suicide attempts, no h/o violence or legal issues, PMH GERD and Asthma, no h/o substance abuse, referred by [facility] providers for SI and worsening depression/anxiety.     Pt still presenting with somatic GI symptoms, however exhibits good insight into underlying anxiety and potential PTSD informing said somatic symptoms. Pt still endorsing anxiety however expresses strong motivation for symptom resolution. No PRN's since last evaluation. Denies SI/HI/AH/VH    Plan:     Psychiatry:  DECREASE Lexapro => 10mg  INCREASE Zoloft => 50mg   Cross titration: Lexapro 15mg, Zoloft 25mg daily--Anxiety, MDD  Ativan 1mg TID--Anxiety  Propranolol 20mg TID--Anxiety    MEDICATIONS  (PRN):  albuterol    90 MICROgram(s) HFA Inhaler 2 Puff(s) Inhalation every 6 hours PRN asthma  diphenhydrAMINE Injectable 50 milliGRAM(s) IntraMuscular once PRN Agitation  haloperidol     Tablet 5 milliGRAM(s) Oral every 6 hours PRN Agitation  haloperidol    Injectable 5 milliGRAM(s) IntraMuscular once PRN Agitation  hydrOXYzine hydrochloride 50 milliGRAM(s) Oral every 6 hours PRN Anxiety  LORazepam     Tablet 2 milliGRAM(s) Oral every 6 hours PRN Agitation  LORazepam   Injectable 2 milliGRAM(s) IntraMuscular once PRN Agitation    Observation: routine, pt. is not an acute risk for suicide while inpatient; no indication for CO    Legal: 9.13 voluntary admission    Medical: Asthma--albuterol rescue inhaler  GERD--protonix 40mg daily  Routine medical followup; TSH, lipid panel and HA1C in the morning  Sx reduction, medication management, safety planning, milieu therapy.  Medical: consult with hospitalist regarding reported GERD and esophageal blockage. Chest Xray was unremarkable, however, pt. reports Hx of EGD and esophageal dilation.     Dispo: pending   32-year-old  male unemployed living with spouse and her family, with diagnosis of MDD with history of one prior psychiatric hospitalization at [facility] from 7/21-7/29/2022 for suicidal thoughts, treated at [facility] since Aug 2022, currently on Lexapro, Ativan, and Propranolol, no history of self-injurious behavior or suicide attempts, no h/o violence or legal issues, PMH GERD and Asthma, no h/o substance abuse, referred by [facility] providers for SI and worsening depression/anxiety.     Pt still presenting with somatic GI symptoms, however exhibits good insight into underlying anxiety and potential PTSD informing said somatic symptoms. Pt still endorsing anxiety but attenuated since admission and expresses strong motivation for symptom resolution. No PRN's since last evaluation. Denies SI/HI/AH/VH.   Continue cross titration.   Plan to switch to single room to ensure patient's comfort on the unit while exploring gender identity and associated anxiety.     Plan:     Psychiatry:  Cross titration: Lexapro 10mg, Zoloft 50mg daily--Anxiety, MDD  Ativan 1mg TID--Anxiety  Propranolol 20mg TID--Anxiety    MEDICATIONS  (PRN):  albuterol    90 MICROgram(s) HFA Inhaler 2 Puff(s) Inhalation every 6 hours PRN asthma  diphenhydrAMINE Injectable 50 milliGRAM(s) IntraMuscular once PRN Agitation  haloperidol     Tablet 5 milliGRAM(s) Oral every 6 hours PRN Agitation  haloperidol    Injectable 5 milliGRAM(s) IntraMuscular once PRN Agitation  hydrOXYzine hydrochloride 50 milliGRAM(s) Oral every 6 hours PRN Anxiety  LORazepam     Tablet 2 milliGRAM(s) Oral every 6 hours PRN Agitation  LORazepam   Injectable 2 milliGRAM(s) IntraMuscular once PRN Agitation    Observation: routine, pt. is not an acute risk for suicide while inpatient; no indication for CO    Legal: 9.13 voluntary admission    Medical: Asthma--albuterol rescue inhaler  GERD--protonix 40mg daily  Routine medical followup; TSH, lipid panel and HA1C in the morning  Sx reduction, medication management, safety planning, milieu therapy.  Medical: consult with hospitalist regarding reported GERD and esophageal blockage. Chest Xray was unremarkable, however, pt. reports Hx of EGD and esophageal dilation.     Dispo: pending
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
## 2022-11-11 NOTE — BH INPATIENT PSYCHIATRY PROGRESS NOTE - NSBHCHARTREVIEWVS_PSY_A_CORE FT
Vital Signs Last 24 Hrs  T(C): 36.4 (11-11-22 @ 06:25), Max: 36.4 (11-11-22 @ 06:25)  T(F): 97.5 (11-11-22 @ 06:25), Max: 97.5 (11-11-22 @ 06:25)  HR: 77 (11-10-22 @ 12:50) (77 - 77)  BP: 114/74 (11-10-22 @ 12:50) (114/74 - 114/74)  BP(mean): --  RR: 17 (11-11-22 @ 08:58) (17 - 17)  SpO2: --    Orthostatic VS  11-11-22 @ 08:07  Lying BP: --/-- HR: --  Sitting BP: 119/72 HR: 66  Standing BP: 111/77 HR: 75  Site: upper left arm  Mode: electronic  Orthostatic VS  11-10-22 @ 19:13  Lying BP: --/-- HR: --  Sitting BP: 116/65 HR: 65  Standing BP: 110/64 HR: 73  Site: --  Mode: --  Orthostatic VS  11-10-22 @ 08:35  Lying BP: --/-- HR: --  Sitting BP: 119/75 HR: 74  Standing BP: --/-- HR: --  Site: --  Mode: --  Orthostatic VS  11-09-22 @ 19:25  Lying BP: --/-- HR: --  Sitting BP: 134/91 HR: 88  Standing BP: 130/96 HR: 91  Site: --  Mode: --  Orthostatic VS  11-09-22 @ 13:46  Lying BP: --/-- HR: --  Sitting BP: 127/90 HR: 84  Standing BP: 131/84 HR: 94  Site: upper left arm  Mode: electronic   Vital Signs Last 24 Hrs  T(C): 36.3 (11-11-22 @ 14:22), Max: 36.4 (11-11-22 @ 06:25)  T(F): 97.3 (11-11-22 @ 14:22), Max: 97.5 (11-11-22 @ 06:25)  HR: --  BP: --  BP(mean): --  RR: 17 (11-11-22 @ 08:58) (17 - 17)  SpO2: --    Orthostatic VS  11-11-22 @ 08:07  Lying BP: --/-- HR: --  Sitting BP: 119/72 HR: 66  Standing BP: 111/77 HR: 75  Site: upper left arm  Mode: electronic  Orthostatic VS  11-10-22 @ 19:13  Lying BP: --/-- HR: --  Sitting BP: 116/65 HR: 65  Standing BP: 110/64 HR: 73  Site: --  Mode: --  Orthostatic VS  11-10-22 @ 08:35  Lying BP: --/-- HR: --  Sitting BP: 119/75 HR: 74  Standing BP: --/-- HR: --  Site: --  Mode: --  Orthostatic VS  11-09-22 @ 19:25  Lying BP: --/-- HR: --  Sitting BP: 134/91 HR: 88  Standing BP: 130/96 HR: 91  Site: --  Mode: --

## 2022-11-11 NOTE — BH INPATIENT PSYCHIATRY PROGRESS NOTE - NSICDXBHSECONDARYDX_PSY_ALL_CORE
Severe episode of recurrent major depressive disorder, without psychotic features   F33.2  Post traumatic stress disorder (PTSD)   F43.10  Somatic preoccupation   F45.9

## 2022-11-11 NOTE — BH INPATIENT PSYCHIATRY PROGRESS NOTE - CURRENT MEDICATION
MEDICATIONS  (STANDING):  escitalopram 15 milliGRAM(s) Oral daily  influenza   Vaccine 0.5 milliLiter(s) IntraMuscular once  LORazepam     Tablet 1 milliGRAM(s) Oral three times a day  pantoprazole    Tablet 40 milliGRAM(s) Oral before breakfast  propranolol 20 milliGRAM(s) Oral three times a day  sertraline 25 milliGRAM(s) Oral daily    MEDICATIONS  (PRN):  albuterol    90 MICROgram(s) HFA Inhaler 2 Puff(s) Inhalation every 6 hours PRN asthma  diphenhydrAMINE Injectable 50 milliGRAM(s) IntraMuscular once PRN Agitation  haloperidol     Tablet 5 milliGRAM(s) Oral every 6 hours PRN Agitation  haloperidol    Injectable 5 milliGRAM(s) IntraMuscular once PRN Agitation  hydrOXYzine hydrochloride 50 milliGRAM(s) Oral every 6 hours PRN Anxiety  LORazepam     Tablet 2 milliGRAM(s) Oral every 6 hours PRN Agitation  LORazepam   Injectable 2 milliGRAM(s) IntraMuscular once PRN Agitation   MEDICATIONS  (STANDING):  escitalopram 10 milliGRAM(s) Oral daily  influenza   Vaccine 0.5 milliLiter(s) IntraMuscular once  LORazepam     Tablet 1 milliGRAM(s) Oral three times a day  pantoprazole    Tablet 40 milliGRAM(s) Oral before breakfast  propranolol 20 milliGRAM(s) Oral three times a day  sertraline 50 milliGRAM(s) Oral daily    MEDICATIONS  (PRN):  albuterol    90 MICROgram(s) HFA Inhaler 2 Puff(s) Inhalation every 6 hours PRN asthma  diphenhydrAMINE Injectable 50 milliGRAM(s) IntraMuscular once PRN Agitation  haloperidol     Tablet 5 milliGRAM(s) Oral every 6 hours PRN Agitation  haloperidol    Injectable 5 milliGRAM(s) IntraMuscular once PRN Agitation  hydrOXYzine hydrochloride 50 milliGRAM(s) Oral every 6 hours PRN Anxiety  LORazepam     Tablet 2 milliGRAM(s) Oral every 6 hours PRN Agitation  LORazepam   Injectable 2 milliGRAM(s) IntraMuscular once PRN Agitation

## 2022-11-12 PROCEDURE — 99232 SBSQ HOSP IP/OBS MODERATE 35: CPT

## 2022-11-12 RX ORDER — SERTRALINE 25 MG/1
75 TABLET, FILM COATED ORAL DAILY
Refills: 0 | Status: DISCONTINUED | OUTPATIENT
Start: 2022-11-13 | End: 2022-11-14

## 2022-11-12 RX ORDER — ESCITALOPRAM OXALATE 10 MG/1
5 TABLET, FILM COATED ORAL DAILY
Refills: 0 | Status: DISCONTINUED | OUTPATIENT
Start: 2022-11-13 | End: 2022-11-14

## 2022-11-12 RX ADMIN — ESCITALOPRAM OXALATE 10 MILLIGRAM(S): 10 TABLET, FILM COATED ORAL at 08:45

## 2022-11-12 RX ADMIN — PANTOPRAZOLE SODIUM 40 MILLIGRAM(S): 20 TABLET, DELAYED RELEASE ORAL at 06:33

## 2022-11-12 RX ADMIN — SERTRALINE 50 MILLIGRAM(S): 25 TABLET, FILM COATED ORAL at 08:45

## 2022-11-12 RX ADMIN — Medication 1 MILLIGRAM(S): at 08:44

## 2022-11-12 RX ADMIN — Medication 1 MILLIGRAM(S): at 13:24

## 2022-11-12 RX ADMIN — Medication 1 MILLIGRAM(S): at 20:26

## 2022-11-12 NOTE — BH INPATIENT PSYCHIATRY PROGRESS NOTE - NSBHCHARTREVIEWVS_PSY_A_CORE FT
Vital Signs Last 24 Hrs  T(C): 36.3 (11-11-22 @ 14:22), Max: 36.3 (11-11-22 @ 14:22)  T(F): 97.3 (11-11-22 @ 14:22), Max: 97.3 (11-11-22 @ 14:22)  HR: --  BP: --  BP(mean): --  RR: 17 (11-11-22 @ 08:58) (17 - 17)  SpO2: --    Orthostatic VS  11-11-22 @ 19:31  Lying BP: --/-- HR: --  Sitting BP: 118/72 HR: 67  Standing BP: 119/71 HR: 76  Site: --  Mode: --  Orthostatic VS  11-11-22 @ 08:07  Lying BP: --/-- HR: --  Sitting BP: 119/72 HR: 66  Standing BP: 111/77 HR: 75  Site: upper left arm  Mode: electronic  Orthostatic VS  11-10-22 @ 19:13  Lying BP: --/-- HR: --  Sitting BP: 116/65 HR: 65  Standing BP: 110/64 HR: 73  Site: --  Mode: --  Orthostatic VS  11-10-22 @ 08:35  Lying BP: --/-- HR: --  Sitting BP: 119/75 HR: 74  Standing BP: --/-- HR: --  Site: --  Mode: --   Vital Signs Last 24 Hrs  T(C): 36.1 (11-12-22 @ 08:33), Max: 36.3 (11-11-22 @ 14:22)  T(F): 97 (11-12-22 @ 08:33), Max: 97.3 (11-11-22 @ 14:22)  HR: --  BP: --  BP(mean): --  RR: 17 (11-11-22 @ 08:58) (17 - 17)  SpO2: --    Orthostatic VS  11-12-22 @ 08:33  Lying BP: --/-- HR: --  Sitting BP: 137/84 HR: 77  Standing BP: 124/73 HR: 86  Site: --  Mode: --  Orthostatic VS  11-11-22 @ 19:31  Lying BP: --/-- HR: --  Sitting BP: 118/72 HR: 67  Standing BP: 119/71 HR: 76  Site: --  Mode: --  Orthostatic VS  11-11-22 @ 08:07  Lying BP: --/-- HR: --  Sitting BP: 119/72 HR: 66  Standing BP: 111/77 HR: 75  Site: upper left arm  Mode: electronic  Orthostatic VS  11-10-22 @ 19:13  Lying BP: --/-- HR: --  Sitting BP: 116/65 HR: 65  Standing BP: 110/64 HR: 73  Site: --  Mode: --

## 2022-11-12 NOTE — BH INPATIENT PSYCHIATRY PROGRESS NOTE - NSBHFUPINTERVALHXFT_PSY_A_CORE
Patient is followed up for MDD, with somatic complaints.  Admitted for depression and passive suicide thoughts. Chart, medications and labs reviewed.  Patient is discussed with nursing staff.  No significant interval events. Per nursing patient requested to be in a single room due to sexual gender  identity issues.  Patient’s room was changed last evening. Nursing reports patient remains compliant with all standing medications and tolerating well. Patient is tolerating cross titration from Lexapro to Zoloft. No SE noted. Lexapro will down taper to 5mg and Zoloft will up titrate to 75mg.  Patient remains in fair behavioral control, there has been no aggression, no prns for aggression. No significant overnight issues.  Eating and sleeping well.  Patient offers no complaints.   Patient is observed in dayroom, social with peers, behavior is appropriate. Patient reports feeling “I’m doing alright but I woke up with some anxiety this morning”  He denies SI/SIB/HI at this time, no plan or intent.  Reports feeling hopeful. States he has hopeful outlook and believes that he can get better. No overt psychotic trend.  Denies AVH, paranoia or delusions. VSS. Continue to monitor and provide therapeutic support.

## 2022-11-12 NOTE — BH INPATIENT PSYCHIATRY PROGRESS NOTE - CURRENT MEDICATION
MEDICATIONS  (STANDING):  escitalopram 10 milliGRAM(s) Oral daily  influenza   Vaccine 0.5 milliLiter(s) IntraMuscular once  LORazepam     Tablet 1 milliGRAM(s) Oral three times a day  pantoprazole    Tablet 40 milliGRAM(s) Oral before breakfast  propranolol 20 milliGRAM(s) Oral three times a day  sertraline 50 milliGRAM(s) Oral daily    MEDICATIONS  (PRN):  albuterol    90 MICROgram(s) HFA Inhaler 2 Puff(s) Inhalation every 6 hours PRN asthma  diphenhydrAMINE Injectable 50 milliGRAM(s) IntraMuscular once PRN Agitation  haloperidol     Tablet 5 milliGRAM(s) Oral every 6 hours PRN Agitation  haloperidol    Injectable 5 milliGRAM(s) IntraMuscular once PRN Agitation  hydrOXYzine hydrochloride 50 milliGRAM(s) Oral every 6 hours PRN Anxiety  LORazepam     Tablet 2 milliGRAM(s) Oral every 6 hours PRN Agitation  LORazepam   Injectable 2 milliGRAM(s) IntraMuscular once PRN Agitation

## 2022-11-12 NOTE — BH INPATIENT PSYCHIATRY PROGRESS NOTE - NSBHASSESSSUMMFT_PSY_ALL_CORE
32-year-old  male unemployed living with spouse and her family, with diagnosis of MDD with history of one prior psychiatric hospitalization at [facility] from 7/21-7/29/2022 for suicidal thoughts, treated at [facility] since Aug 2022, currently on Lexapro, Ativan, and Propranolol, no history of self-injurious behavior or suicide attempts, no h/o violence or legal issues, PMH GERD and Asthma, no h/o substance abuse, referred by [facility] providers for SI and worsening depression/anxiety.     Pt still presenting with somatic GI symptoms, however exhibits good insight into underlying anxiety and potential PTSD informing said somatic symptoms. Pt still endorsing anxiety but attenuated since admission and expresses strong motivation for symptom resolution. No PRN's since last evaluation. Denies SI/HI/AH/VH.   Continue cross titration.   Plan to switch to single room to ensure patient's comfort on the unit while exploring gender identity and associated anxiety.     Plan:     Psychiatry:  Cross titration: Lexapro 10mg, Zoloft 50mg daily--Anxiety, MDD  Ativan 1mg TID--Anxiety  Propranolol 20mg TID--Anxiety    MEDICATIONS  (PRN):  albuterol    90 MICROgram(s) HFA Inhaler 2 Puff(s) Inhalation every 6 hours PRN asthma  diphenhydrAMINE Injectable 50 milliGRAM(s) IntraMuscular once PRN Agitation  haloperidol     Tablet 5 milliGRAM(s) Oral every 6 hours PRN Agitation  haloperidol    Injectable 5 milliGRAM(s) IntraMuscular once PRN Agitation  hydrOXYzine hydrochloride 50 milliGRAM(s) Oral every 6 hours PRN Anxiety  LORazepam     Tablet 2 milliGRAM(s) Oral every 6 hours PRN Agitation  LORazepam   Injectable 2 milliGRAM(s) IntraMuscular once PRN Agitation    Observation: routine, pt. is not an acute risk for suicide while inpatient; no indication for CO    Legal: 9.13 voluntary admission    Medical: Asthma--albuterol rescue inhaler  GERD--protonix 40mg daily  Routine medical followup; TSH, lipid panel and HA1C in the morning  Sx reduction, medication management, safety planning, milieu therapy.  Medical: consult with hospitalist regarding reported GERD and esophageal blockage. Chest Xray was unremarkable, however, pt. reports Hx of EGD and esophageal dilation.     Dispo: pending   32-year-old  male unemployed living with spouse and her family, with diagnosis of MDD with history of one prior psychiatric hospitalization at [facility] from 7/21-7/29/2022 for suicidal thoughts, treated [facility] since Aug 2022, currently on Lexapro, Ativan, and Propranolol, no history of self-injurious behavior or suicide attempts, no h/o violence or legal issues, PMH GERD and Asthma, no h/o substance abuse, referred by [facility] providers for SI and worsening depression/anxiety.     Pt still presenting with somatic GI symptoms, however exhibits good insight into underlying anxiety and potential PTSD informing said somatic symptoms. Pt still endorsing anxiety but attenuated since admission and expresses strong motivation for symptom resolution. No PRN's since last evaluation. Denies SI/HI/AH/VH.   Continue cross titration.   Plan to switch to single room to ensure patient's comfort on the unit while exploring gender identity and associated anxiety.     Plan:     Psychiatry:  Cross titration: Lexapro will down taper to 5mg and Zoloft will up titrate to 75mg--MDD  Ativan 1mg TID--Anxiety  Propranolol 20mg TID--Anxiety    MEDICATIONS  (PRN):  albuterol    90 MICROgram(s) HFA Inhaler 2 Puff(s) Inhalation every 6 hours PRN asthma  diphenhydrAMINE Injectable 50 milliGRAM(s) IntraMuscular once PRN Agitation  haloperidol     Tablet 5 milliGRAM(s) Oral every 6 hours PRN Agitation  haloperidol    Injectable 5 milliGRAM(s) IntraMuscular once PRN Agitation  hydrOXYzine hydrochloride 50 milliGRAM(s) Oral every 6 hours PRN Anxiety  LORazepam     Tablet 2 milliGRAM(s) Oral every 6 hours PRN Agitation  LORazepam   Injectable 2 milliGRAM(s) IntraMuscular once PRN Agitation    Observation: routine, pt. is not an acute risk for suicide while inpatient; no indication for CO    Legal: 9.13 voluntary admission    Medical: Asthma--albuterol rescue inhaler  GERD--protonix 40mg daily  Routine medical followup; TSH, lipid panel and HA1C in the morning  Sx reduction, medication management, safety planning, milieu therapy.  Medical: consult with hospitalist regarding reported GERD and esophageal blockage. Chest Xray was unremarkable, however, pt. reports Hx of EGD and esophageal dilation.     Dispo: pending

## 2022-11-12 NOTE — BH INPATIENT PSYCHIATRY PROGRESS NOTE - NSBHMETABOLIC_PSY_ALL_CORE_FT
BMI: BMI (kg/m2): 39.5 (11-09-22 @ 13:46)  HbA1c: A1C with Estimated Average Glucose Result: 4.9 % (11-10-22 @ 08:00)    Glucose: POCT Blood Glucose.: 101 mg/dL (12-15-21 @ 00:29)    BP: 114/74 (11-10-22 @ 12:50) (114/74 - 134/90)  Lipid Panel: Date/Time: 11-10-22 @ 08:00  Cholesterol, Serum: 209  Direct LDL: --  HDL Cholesterol, Serum: 26  Total Cholesterol/HDL Ration Measurement: --  Triglycerides, Serum: 105   BMI: BMI (kg/m2): 39.5 (11-09-22 @ 13:46)  HbA1c: A1C with Estimated Average Glucose Result: 4.9 % (11-10-22 @ 08:00)    Glucose: POCT Blood Glucose.: 101 mg/dL (12-15-21 @ 00:29)    BP: 114/74 (11-10-22 @ 12:50) (114/74 - 133/98)  Lipid Panel: Date/Time: 11-10-22 @ 08:00  Cholesterol, Serum: 209  Direct LDL: --  HDL Cholesterol, Serum: 26  Total Cholesterol/HDL Ration Measurement: --  Triglycerides, Serum: 105

## 2022-11-13 PROCEDURE — 99232 SBSQ HOSP IP/OBS MODERATE 35: CPT

## 2022-11-13 RX ADMIN — Medication 1 MILLIGRAM(S): at 14:10

## 2022-11-13 RX ADMIN — ESCITALOPRAM OXALATE 5 MILLIGRAM(S): 10 TABLET, FILM COATED ORAL at 09:13

## 2022-11-13 RX ADMIN — Medication 1 MILLIGRAM(S): at 09:13

## 2022-11-13 RX ADMIN — SERTRALINE 75 MILLIGRAM(S): 25 TABLET, FILM COATED ORAL at 09:13

## 2022-11-13 RX ADMIN — Medication 1 MILLIGRAM(S): at 20:29

## 2022-11-13 RX ADMIN — PANTOPRAZOLE SODIUM 40 MILLIGRAM(S): 20 TABLET, DELAYED RELEASE ORAL at 09:12

## 2022-11-13 NOTE — BH INPATIENT PSYCHIATRY PROGRESS NOTE - NSBHASSESSSUMMFT_PSY_ALL_CORE
32-year-old  male unemployed living with spouse and her family, with diagnosis of MDD with history of one prior psychiatric hospitalization at [facility] from 7/21-7/29/2022 for suicidal thoughts, treated at [facility] since Aug 2022, currently on Lexapro, Ativan, and Propranolol, no history of self-injurious behavior or suicide attempts, no h/o violence or legal issues, PMH GERD and Asthma, no h/o substance abuse, referred by [facility] providers for SI and worsening depression/anxiety.     Pt still presenting with somatic GI symptoms, however exhibits good insight into underlying anxiety and potential PTSD informing said somatic symptoms. Pt still endorsing anxiety but attenuated since admission and expresses strong motivation for symptom resolution. No PRN's since last evaluation. Denies SI/HI/AH/VH.   Continue cross titration.   Plan to switch to single room to ensure patient's comfort on the unit while exploring gender identity and associated anxiety.     Plan:     Psychiatry:  Cross titration: Lexapro will down taper to 5mg and Zoloft will up titrate to 75mg--MDD  Ativan 1mg TID--Anxiety  Propranolol 20mg TID--Anxiety    MEDICATIONS  (PRN):  albuterol    90 MICROgram(s) HFA Inhaler 2 Puff(s) Inhalation every 6 hours PRN asthma  diphenhydrAMINE Injectable 50 milliGRAM(s) IntraMuscular once PRN Agitation  haloperidol     Tablet 5 milliGRAM(s) Oral every 6 hours PRN Agitation  haloperidol    Injectable 5 milliGRAM(s) IntraMuscular once PRN Agitation  hydrOXYzine hydrochloride 50 milliGRAM(s) Oral every 6 hours PRN Anxiety  LORazepam     Tablet 2 milliGRAM(s) Oral every 6 hours PRN Agitation  LORazepam   Injectable 2 milliGRAM(s) IntraMuscular once PRN Agitation    Observation: routine, pt. is not an acute risk for suicide while inpatient; no indication for CO    Legal: 9.13 voluntary admission    Medical: Asthma--albuterol rescue inhaler  GERD--protonix 40mg daily  Routine medical followup; TSH, lipid panel and HA1C in the morning  Sx reduction, medication management, safety planning, milieu therapy.  Medical: consult with hospitalist regarding reported GERD and esophageal blockage. Chest Xray was unremarkable, however, pt. reports Hx of EGD and esophageal dilation.     Dispo: pending   32-year-old  male unemployed living with spouse and her family, with diagnosis of MDD with history of one prior psychiatric hospitalization at [facility] from 7/21-7/29/2022 for suicidal thoughts, [facility] since Aug 2022, currently on Lexapro, Ativan, and Propranolol, no history of self-injurious behavior or suicide attempts, no h/o violence or legal issues, PMH GERD and Asthma, no h/o substance abuse, referred by [facility] providers for SI and worsening depression/anxiety.     Pt still presenting with somatic GI symptoms, however exhibits good insight into underlying anxiety and potential PTSD informing said somatic symptoms. Pt still endorsing anxiety but attenuated since admission and expresses strong motivation for symptom resolution. No PRN's since last evaluation. Denies SI/HI/AH/VH.   Continue cross titration.   Plan to switch to single room to ensure patient's comfort on the unit while exploring gender identity and associated anxiety.     Plan:     Psychiatry:  Cross titration: Will discontinue Lexapro and titrate Zoloft 125mg--MDD  Ativan 1mg TID--Anxiety  Propranolol 20mg TID--Anxiety    MEDICATIONS  (PRN):  albuterol    90 MICROgram(s) HFA Inhaler 2 Puff(s) Inhalation every 6 hours PRN asthma  diphenhydrAMINE Injectable 50 milliGRAM(s) IntraMuscular once PRN Agitation  haloperidol     Tablet 5 milliGRAM(s) Oral every 6 hours PRN Agitation  haloperidol    Injectable 5 milliGRAM(s) IntraMuscular once PRN Agitation  hydrOXYzine hydrochloride 50 milliGRAM(s) Oral every 6 hours PRN Anxiety  LORazepam     Tablet 2 milliGRAM(s) Oral every 6 hours PRN Agitation  LORazepam   Injectable 2 milliGRAM(s) IntraMuscular once PRN Agitation    Observation: routine, pt. is not an acute risk for suicide while inpatient; no indication for CO    Legal: 9.13 voluntary admission    Medical: Asthma--albuterol rescue inhaler  GERD--protonix 40mg daily  Routine medical followup; TSH, lipid panel and HA1C in the morning  Sx reduction, medication management, safety planning, milieu therapy.  Medical: consult with hospitalist regarding reported GERD and esophageal blockage. Chest Xray was unremarkable, however, pt. reports Hx of EGD and esophageal dilation.     Dispo: pending

## 2022-11-13 NOTE — BH INPATIENT PSYCHIATRY PROGRESS NOTE - NSBHFUPINTERVALHXFT_PSY_A_CORE
Patient is followed up for MDD, with somatic complaints.  Admitted for depression and passive suicide thoughts. Chart, medications and labs reviewed.  Patient is discussed with nursing staff.  No significant interval events. Nursing reports patient remains compliant with all standing medications and tolerating well. Patient is tolerating cross titration from Lexapro to Zoloft. No SE noted. Lexapro will be discontinued today and  Zoloft will up titrate to 125mg.  Patient remains in fair behavioral control, there has been no aggression, no prns for aggression. No significant overnight issues.  Eating and sleeping well.  Patient offers no complaints.   Patient is observed in dayroom, behavior is appropriate, social with peers. Patient reports feeling “I’m feeling ok, just a little tired”  He denies SI/SIB/HI at this time, no plan or intent.  States he has hopeful outlook and believes that he can get better. No overt psychotic trend.  Denies AVH, paranoia or delusions. VSS. Continue to monitor and provide therapeutic support.

## 2022-11-13 NOTE — BH INPATIENT PSYCHIATRY PROGRESS NOTE - CURRENT MEDICATION
MEDICATIONS  (STANDING):  escitalopram 5 milliGRAM(s) Oral daily  influenza   Vaccine 0.5 milliLiter(s) IntraMuscular once  LORazepam     Tablet 1 milliGRAM(s) Oral three times a day  pantoprazole    Tablet 40 milliGRAM(s) Oral before breakfast  propranolol 20 milliGRAM(s) Oral three times a day  sertraline 75 milliGRAM(s) Oral daily    MEDICATIONS  (PRN):  albuterol    90 MICROgram(s) HFA Inhaler 2 Puff(s) Inhalation every 6 hours PRN asthma  diphenhydrAMINE Injectable 50 milliGRAM(s) IntraMuscular once PRN Agitation  haloperidol     Tablet 5 milliGRAM(s) Oral every 6 hours PRN Agitation  haloperidol    Injectable 5 milliGRAM(s) IntraMuscular once PRN Agitation  hydrOXYzine hydrochloride 50 milliGRAM(s) Oral every 6 hours PRN Anxiety  LORazepam     Tablet 2 milliGRAM(s) Oral every 6 hours PRN Agitation  LORazepam   Injectable 2 milliGRAM(s) IntraMuscular once PRN Agitation

## 2022-11-13 NOTE — BH INPATIENT PSYCHIATRY PROGRESS NOTE - NSBHCHARTREVIEWVS_PSY_A_CORE FT
Vital Signs Last 24 Hrs  T(C): 36.7 (11-13-22 @ 06:24), Max: 36.7 (11-13-22 @ 06:24)  T(F): 98 (11-13-22 @ 06:24), Max: 98 (11-13-22 @ 06:24)  HR: 72 (11-12-22 @ 13:11) (72 - 72)  BP: 109/76 (11-12-22 @ 13:11) (109/76 - 109/76)  BP(mean): --  RR: 17 (11-12-22 @ 13:11) (17 - 17)  SpO2: --    Orthostatic VS  11-12-22 @ 19:03  Lying BP: --/-- HR: --  Sitting BP: 132/96 HR: 70  Standing BP: 116/88 HR: 76  Site: --  Mode: --  Orthostatic VS  11-12-22 @ 08:33  Lying BP: --/-- HR: --  Sitting BP: 137/84 HR: 77  Standing BP: 124/73 HR: 86  Site: --  Mode: --  Orthostatic VS  11-11-22 @ 19:31  Lying BP: --/-- HR: --  Sitting BP: 118/72 HR: 67  Standing BP: 119/71 HR: 76  Site: --  Mode: --  Orthostatic VS  11-11-22 @ 08:07  Lying BP: --/-- HR: --  Sitting BP: 119/72 HR: 66  Standing BP: 111/77 HR: 75  Site: upper left arm  Mode: electronic   Vital Signs Last 24 Hrs  T(C): 36.7 (11-13-22 @ 06:24), Max: 36.7 (11-13-22 @ 06:24)  T(F): 98 (11-13-22 @ 06:24), Max: 98 (11-13-22 @ 06:24)  HR: 72 (11-12-22 @ 13:11) (72 - 72)  BP: 109/76 (11-12-22 @ 13:11) (109/76 - 109/76)  BP(mean): --  RR: 17 (11-12-22 @ 13:11) (17 - 17)  SpO2: --    Orthostatic VS  11-13-22 @ 06:24  Lying BP: --/-- HR: --  Sitting BP: 118/74 HR: 74  Standing BP: 112/73 HR: 76  Site: --  Mode: --  Orthostatic VS  11-12-22 @ 19:03  Lying BP: --/-- HR: --  Sitting BP: 132/96 HR: 70  Standing BP: 116/88 HR: 76  Site: --  Mode: --  Orthostatic VS  11-12-22 @ 08:33  Lying BP: --/-- HR: --  Sitting BP: 137/84 HR: 77  Standing BP: 124/73 HR: 86  Site: --  Mode: --  Orthostatic VS  11-11-22 @ 19:31  Lying BP: --/-- HR: --  Sitting BP: 118/72 HR: 67  Standing BP: 119/71 HR: 76  Site: --  Mode: --

## 2022-11-13 NOTE — BH INPATIENT PSYCHIATRY PROGRESS NOTE - NSBHMETABOLIC_PSY_ALL_CORE_FT
BMI: BMI (kg/m2): 39.5 (11-09-22 @ 13:46)  HbA1c: A1C with Estimated Average Glucose Result: 4.9 % (11-10-22 @ 08:00)    Glucose: POCT Blood Glucose.: 101 mg/dL (12-15-21 @ 00:29)    BP: 109/76 (11-12-22 @ 13:11) (109/76 - 114/74)  Lipid Panel: Date/Time: 11-10-22 @ 08:00  Cholesterol, Serum: 209  Direct LDL: --  HDL Cholesterol, Serum: 26  Total Cholesterol/HDL Ration Measurement: --  Triglycerides, Serum: 105

## 2022-11-14 RX ORDER — SERTRALINE 25 MG/1
125 TABLET, FILM COATED ORAL DAILY
Refills: 0 | Status: DISCONTINUED | OUTPATIENT
Start: 2022-11-14 | End: 2022-11-16

## 2022-11-14 RX ADMIN — PANTOPRAZOLE SODIUM 40 MILLIGRAM(S): 20 TABLET, DELAYED RELEASE ORAL at 08:33

## 2022-11-14 RX ADMIN — ESCITALOPRAM OXALATE 5 MILLIGRAM(S): 10 TABLET, FILM COATED ORAL at 08:33

## 2022-11-14 RX ADMIN — Medication 1 MILLIGRAM(S): at 12:39

## 2022-11-14 RX ADMIN — Medication 1 MILLIGRAM(S): at 08:33

## 2022-11-14 RX ADMIN — Medication 50 MILLIGRAM(S): at 22:28

## 2022-11-14 RX ADMIN — Medication 1 MILLIGRAM(S): at 20:53

## 2022-11-14 RX ADMIN — SERTRALINE 75 MILLIGRAM(S): 25 TABLET, FILM COATED ORAL at 08:33

## 2022-11-14 NOTE — BH INPATIENT PSYCHIATRY PROGRESS NOTE - CURRENT MEDICATION
MEDICATIONS  (STANDING):  influenza   Vaccine 0.5 milliLiter(s) IntraMuscular once  LORazepam     Tablet 1 milliGRAM(s) Oral three times a day  pantoprazole    Tablet 40 milliGRAM(s) Oral before breakfast  propranolol 20 milliGRAM(s) Oral three times a day  sertraline 125 milliGRAM(s) Oral daily    MEDICATIONS  (PRN):  albuterol    90 MICROgram(s) HFA Inhaler 2 Puff(s) Inhalation every 6 hours PRN asthma  diphenhydrAMINE Injectable 50 milliGRAM(s) IntraMuscular once PRN Agitation  haloperidol     Tablet 5 milliGRAM(s) Oral every 6 hours PRN Agitation  haloperidol    Injectable 5 milliGRAM(s) IntraMuscular once PRN Agitation  hydrOXYzine hydrochloride 50 milliGRAM(s) Oral every 6 hours PRN Anxiety  LORazepam     Tablet 2 milliGRAM(s) Oral every 6 hours PRN Agitation  LORazepam   Injectable 2 milliGRAM(s) IntraMuscular once PRN Agitation

## 2022-11-14 NOTE — BH INPATIENT PSYCHIATRY PROGRESS NOTE - NSBHFUPINTERVALHXFT_PSY_A_CORE
Patient is followed up for MDD, with somatic complaints.  Admitted for depression and passive suicide thoughts. Chart, medications and labs reviewed.  Patient is discussed with nursing staff.  No significant interval events. Nursing reports patient remains compliant with all standing medications and tolerating well. Patient is tolerating cross titration from Lexapro to Zoloft. No SE noted.   The patient was seen in his room. He appeared brighter, was smiling. The pt. stated he feels more comfortable in the single room. He feels overall less anxious and is "accepting myself more." The patient stated exploring his identity more on the unit has helped to ease his anxiety as well. He described to the writer how he plans to gradually include more feminine items into his wardrobe. Pt. also stated he is feeling more confident that he will be able to discuss his identity with his father-in law.  The pt. denied A/H, V/H, s/i/p, h/i/p and delusions.

## 2022-11-14 NOTE — BH INPATIENT PSYCHIATRY PROGRESS NOTE - NSBHMETABOLIC_PSY_ALL_CORE_FT
BMI: BMI (kg/m2): 39.5 (11-09-22 @ 13:46)  HbA1c: A1C with Estimated Average Glucose Result: 4.9 % (11-10-22 @ 08:00)    Glucose: POCT Blood Glucose.: 101 mg/dL (12-15-21 @ 00:29)    BP: --  Lipid Panel: Date/Time: 11-10-22 @ 08:00  Cholesterol, Serum: 209  Direct LDL: --  HDL Cholesterol, Serum: 26  Total Cholesterol/HDL Ration Measurement: --  Triglycerides, Serum: 105

## 2022-11-14 NOTE — BH INPATIENT PSYCHIATRY PROGRESS NOTE - NSBHCHARTREVIEWVS_PSY_A_CORE FT
Vital Signs Last 24 Hrs  T(C): 36.2 (11-16-22 @ 06:36), Max: 36.8 (11-15-22 @ 19:57)  T(F): 97.2 (11-16-22 @ 06:36), Max: 98.3 (11-15-22 @ 19:57)  HR: --  BP: --  BP(mean): --  RR: --  SpO2: --    Orthostatic VS  11-16-22 @ 08:08  Lying BP: --/-- HR: --  Sitting BP: 128/70 HR: 66  Standing BP: 112/66 HR: 80  Site: --  Mode: --  Orthostatic VS  11-15-22 @ 19:57  Lying BP: --/-- HR: --  Sitting BP: 122/68 HR: --  Standing BP: 110/63 HR: 82  Site: --  Mode: --  Orthostatic VS  11-15-22 @ 08:01  Lying BP: --/-- HR: --  Sitting BP: 117/72 HR: 66  Standing BP: 119/80 HR: 87  Site: --  Mode: --  Orthostatic VS  11-14-22 @ 19:21  Lying BP: --/-- HR: --  Sitting BP: 116/69 HR: 69  Standing BP: 115/67 HR: 77  Site: --  Mode: --

## 2022-11-14 NOTE — BH PSYCHOLOGY - CLINICIAN PSYCHOTHERAPY NOTE - NSBHPSYCHOLNARRATIVE_PSY_A_CORE FT
Writer and patient wore masks during the session due to COVID precautions. Pt was alert and appeared to have adequate hygiene and grooming. Pt denied any current suicidal ideation, intent, or plan, and did not endorse any homicidal ideation. Pt reported "mixed" mood and displayed mainly euthymic affect. Speech within normal limits. Thought processes linear and goal directed. Pt denied any auditory or visual hallucinations. Oriented x3. Pt demonstrated good insight and judgment.     Pt's gender identity discussed; pt identifies as non-binary and requested pronouns "they/them" be used going forward. Pt reported improved anxiety and less somatic symptoms. Pt demonstrated insight regarding how anxiety may have impacted their somatic symptoms. Role of gender identity acceptance/expression in anxiety explored. Challenges of identity shift also explored. Pt identified goals to obtain employment and change living arrangement. Support, validation, and empathic listening provided.

## 2022-11-14 NOTE — BH INPATIENT PSYCHIATRY PROGRESS NOTE - NSBHASSESSSUMMFT_PSY_ALL_CORE
32-year-old  male unemployed living with spouse and her family, with diagnosis of MDD with history of one prior psychiatric hospitalization at [facility] from 7/21-7/29/2022 for suicidal thoughts, treated at [facility] since Aug 2022, currently on Lexapro, Ativan, and Propranolol, no history of self-injurious behavior or suicide attempts, no h/o violence or legal issues, PMH GERD and Asthma, no h/o substance abuse, referred by [facility] providers for SI and worsening depression/anxiety.     Pt presents as brighter, less anxious and attenuating GI Sx. Change to private room has aided in gender identity exploration. Titrate Zoloft to 125mg. Discontinue Lexapro.     Plan:     Psychiatry:  Cross titration: Will discontinue Lexapro and titrate Zoloft 125mg--MDD  Ativan 1mg TID--Anxiety  Propranolol 20mg TID--Anxiety    MEDICATIONS  (PRN):  albuterol    90 MICROgram(s) HFA Inhaler 2 Puff(s) Inhalation every 6 hours PRN asthma  diphenhydrAMINE Injectable 50 milliGRAM(s) IntraMuscular once PRN Agitation  haloperidol     Tablet 5 milliGRAM(s) Oral every 6 hours PRN Agitation  haloperidol    Injectable 5 milliGRAM(s) IntraMuscular once PRN Agitation  hydrOXYzine hydrochloride 50 milliGRAM(s) Oral every 6 hours PRN Anxiety  LORazepam     Tablet 2 milliGRAM(s) Oral every 6 hours PRN Agitation  LORazepam   Injectable 2 milliGRAM(s) IntraMuscular once PRN Agitation    Observation: routine, pt. is not an acute risk for suicide while inpatient; no indication for CO    Legal: 9.13 voluntary admission    Medical: Asthma--albuterol rescue inhaler  GERD--protonix 40mg daily  Routine medical followup; TSH, lipid panel and HA1C in the morning  Sx reduction, medication management, safety planning, milieu therapy.  Medical: consult with hospitalist regarding reported GERD and esophageal blockage. Chest Xray was unremarkable, however, pt. reports Hx of EGD and esophageal dilation.     Dispo: pending

## 2022-11-15 RX ADMIN — PANTOPRAZOLE SODIUM 40 MILLIGRAM(S): 20 TABLET, DELAYED RELEASE ORAL at 06:31

## 2022-11-15 RX ADMIN — Medication 1 MILLIGRAM(S): at 08:38

## 2022-11-15 RX ADMIN — Medication 1 MILLIGRAM(S): at 20:57

## 2022-11-15 RX ADMIN — SERTRALINE 125 MILLIGRAM(S): 25 TABLET, FILM COATED ORAL at 08:38

## 2022-11-15 RX ADMIN — Medication 1 MILLIGRAM(S): at 12:36

## 2022-11-15 NOTE — BH INPATIENT PSYCHIATRY PROGRESS NOTE - NSBHASSESSSUMMFT_PSY_ALL_CORE
Assessment Summary	32-year-old  **nonbinary person** unemployed living with spouse and her family, with diagnosis of MDD with history of one prior psychiatric hospitalization at [facility] from 7/21-7/29/2022 for suicidal thoughts, treated at [facility] since Aug 2022, currently on Lexapro, Ativan, and Propranolol, no history of self-injurious behavior or suicide attempts, no h/o violence or legal issues, PMH GERD and Asthma, no h/o substance abuse, referred by [facility] providers for SI and worsening depression/anxiety.     Pt still presenting with somatic GI symptoms, however exhibits good insight into underlying anxiety and potential PTSD informing said somatic symptoms. Pt still endorsing anxiety but attenuated since admission and expresses strong motivation for symptom resolution. Patient endorses some side effects of medication, namely increased irritability and erectile dysfunction. Received atarax PRN in order to sleep. Denies SI/HI/AH/VH.   Continue on Zoloft 125mg, monitor for symptoms of increased irritability in the setting of possible jose as SE.     Plan:     Psychiatry:  Continue Zoloft 125mg--MDD  Ativan 1mg TID--Anxiety  Propranolol 20mg TID--Anxiety    MEDICATIONS  (PRN):  albuterol    90 MICROgram(s) HFA Inhaler 2 Puff(s) Inhalation every 6 hours PRN asthma  diphenhydrAMINE Injectable 50 milliGRAM(s) IntraMuscular once PRN Agitation  haloperidol     Tablet 5 milliGRAM(s) Oral every 6 hours PRN Agitation  haloperidol    Injectable 5 milliGRAM(s) IntraMuscular once PRN Agitation  hydrOXYzine hydrochloride 50 milliGRAM(s) Oral every 6 hours PRN Anxiety  LORazepam     Tablet 2 milliGRAM(s) Oral every 6 hours PRN Agitation  LORazepam   Injectable 2 milliGRAM(s) IntraMuscular once PRN Agitation    Observation: routine, pt. is not an acute risk for suicide while inpatient; no indication for CO    Legal: 9.13 voluntary admission    Medical: Asthma--albuterol rescue inhaler  GERD--protonix 40mg daily  Routine medical followup; TSH, lipid panel and HA1C in the morning  Sx reduction, medication management, safety planning, milieu therapy.  Medical: consult with hospitalist regarding reported GERD and esophageal blockage. Chest Xray was unremarkable, however, pt. reports Hx of EGD and esophageal dilation.     Dispo: pending   32-year-old  **nonbinary person** unemployed living with spouse and her family, with diagnosis of MDD with history of one prior psychiatric hospitalization at [facility] from 7/21-7/29/2022 for suicidal thoughts, treated [facility] Aug 2022, currently on Lexapro, Ativan, and Propranolol, no history of self-injurious behavior or suicide attempts, no h/o violence or legal issues, PMH GERD and Asthma, no h/o substance abuse, referred by [facility] providers for SI and worsening depression/anxiety.     Pt still presenting with somatic GI symptoms, however exhibits good insight into underlying anxiety and potential PTSD informing said somatic symptoms. Pt still endorsing anxiety but attenuated since admission and expresses strong motivation for symptom resolution. Patient endorses some side effects of medication, namely increased irritability and erectile dysfunction. Received atarax PRN in order to sleep. Denies SI/HI/AH/VH.   Continue on Zoloft 125mg, monitor for symptoms of increased irritability in the setting of possible jose as SE.     Plan:     Psychiatry:  Continue Zoloft 125mg--MDD  Ativan 1mg TID--Anxiety  Propranolol 20mg TID--Anxiety    MEDICATIONS  (PRN):  albuterol    90 MICROgram(s) HFA Inhaler 2 Puff(s) Inhalation every 6 hours PRN asthma  diphenhydrAMINE Injectable 50 milliGRAM(s) IntraMuscular once PRN Agitation  haloperidol     Tablet 5 milliGRAM(s) Oral every 6 hours PRN Agitation  haloperidol    Injectable 5 milliGRAM(s) IntraMuscular once PRN Agitation  hydrOXYzine hydrochloride 50 milliGRAM(s) Oral every 6 hours PRN Anxiety  LORazepam     Tablet 2 milliGRAM(s) Oral every 6 hours PRN Agitation  LORazepam   Injectable 2 milliGRAM(s) IntraMuscular once PRN Agitation    Observation: routine, pt. is not an acute risk for suicide while inpatient; no indication for CO    Legal: 9.13 voluntary admission    Medical: Asthma--albuterol rescue inhaler  GERD--protonix 40mg daily  Routine medical followup; TSH, lipid panel and HA1C in the morning  Sx reduction, medication management, safety planning, milieu therapy.  Medical: consult with hospitalist regarding reported GERD and esophageal blockage. Chest Xray was unremarkable, however, pt. reports Hx of EGD and esophageal dilation.     Dispo: pending   32-year-old  **nonbinary person** unemployed living with spouse and her family, with diagnosis of MDD with history of one prior psychiatric hospitalization at [facility] from 7/21-7/29/2022 for suicidal thoughts, treated [facility] Aug 2022, currently on Lexapro, Ativan, and Propranolol, no history of self-injurious behavior or suicide attempts, no h/o violence or legal issues, PMH GERD and Asthma, no h/o substance abuse, referred by [facility] providers for SI and worsening depression/anxiety.     Pt described experiencing irritability and poor sleep last night. Likely related to disagreement with their wife.  Will continue to monitor for changes in mood indicative of manic Sx. Pt. continues to demonstrate attenuating anxiety. Hold Zoloft titration.     Plan:     Psychiatry:  Continue Zoloft 125mg--MDD  Ativan 1mg TID--Anxiety  Propranolol 20mg TID--Anxiety    MEDICATIONS  (PRN):  albuterol    90 MICROgram(s) HFA Inhaler 2 Puff(s) Inhalation every 6 hours PRN asthma  diphenhydrAMINE Injectable 50 milliGRAM(s) IntraMuscular once PRN Agitation  haloperidol     Tablet 5 milliGRAM(s) Oral every 6 hours PRN Agitation  haloperidol    Injectable 5 milliGRAM(s) IntraMuscular once PRN Agitation  hydrOXYzine hydrochloride 50 milliGRAM(s) Oral every 6 hours PRN Anxiety  LORazepam     Tablet 2 milliGRAM(s) Oral every 6 hours PRN Agitation  LORazepam   Injectable 2 milliGRAM(s) IntraMuscular once PRN Agitation    Observation: routine, pt. is not an acute risk for suicide while inpatient; no indication for CO    Legal: 9.13 voluntary admission    Medical: Asthma--albuterol rescue inhaler  GERD--protonix 40mg daily  Routine medical followup; TSH, lipid panel and HA1C in the morning  Sx reduction, medication management, safety planning, milieu therapy.  Medical: consult with hospitalist regarding reported GERD and esophageal blockage. Chest Xray was unremarkable, however, pt. reports Hx of EGD and esophageal dilation.     Dispo: pending

## 2022-11-15 NOTE — BH INPATIENT PSYCHIATRY PROGRESS NOTE - NSBHCHARTREVIEWVS_PSY_A_CORE FT
Vital Signs Last 24 Hrs  T(C): 36.6 (11-15-22 @ 06:11), Max: 36.6 (11-14-22 @ 22:32)  T(F): 97.9 (11-15-22 @ 06:11), Max: 97.9 (11-15-22 @ 06:11)  HR: --  BP: --  BP(mean): --  RR: 17 (11-15-22 @ 08:01) (17 - 17)  SpO2: --    Orthostatic VS  11-15-22 @ 08:01  Lying BP: --/-- HR: --  Sitting BP: 117/72 HR: 66  Standing BP: 119/80 HR: 87  Site: --  Mode: --  Orthostatic VS  11-14-22 @ 19:21  Lying BP: --/-- HR: --  Sitting BP: 116/69 HR: 69  Standing BP: 115/67 HR: 77  Site: --  Mode: --  Orthostatic VS  11-14-22 @ 08:13  Lying BP: --/-- HR: --  Sitting BP: 116/61 HR: 71  Standing BP: 110/67 HR: 88  Site: --  Mode: --  Orthostatic VS  11-13-22 @ 19:39  Lying BP: --/-- HR: --  Sitting BP: 101/68 HR: 82  Standing BP: 95/63 HR: --  Site: --  Mode: --   Vital Signs Last 24 Hrs  T(C): 36.2 (11-16-22 @ 06:36), Max: 36.8 (11-15-22 @ 19:57)  T(F): 97.2 (11-16-22 @ 06:36), Max: 98.3 (11-15-22 @ 19:57)  HR: --  BP: --  BP(mean): --  RR: --  SpO2: --    Orthostatic VS  11-16-22 @ 08:08  Lying BP: --/-- HR: --  Sitting BP: 128/70 HR: 66  Standing BP: 112/66 HR: 80  Site: --  Mode: --  Orthostatic VS  11-15-22 @ 19:57  Lying BP: --/-- HR: --  Sitting BP: 122/68 HR: --  Standing BP: 110/63 HR: 82  Site: --  Mode: --  Orthostatic VS  11-15-22 @ 08:01  Lying BP: --/-- HR: --  Sitting BP: 117/72 HR: 66  Standing BP: 119/80 HR: 87  Site: --  Mode: --  Orthostatic VS  11-14-22 @ 19:21  Lying BP: --/-- HR: --  Sitting BP: 116/69 HR: 69  Standing BP: 115/67 HR: 77  Site: --  Mode: --

## 2022-11-15 NOTE — BH INPATIENT PSYCHIATRY PROGRESS NOTE - NSBHFUPINTERVALHXFT_PSY_A_CORE
Patient is followed up for MDD, with somatic complaints.  Admitted for depression and passive suicide thoughts. Chart, medications and labs reviewed.  Patient is discussed with nursing staff.  No significant interval events. Nursing reports patient remains compliant with all standing medications and tolerating well. Patient is tolerating cross titration from Lexapro to Zoloft. Today described "feeling off, like I'm getting a cold" as well as restlessness, increased irritability, and erectile dysfunction as side effects of the Zoloft. Although patient has not described previous history of jose, we will not be increasing the medication and continuing on Zoloft 125mg, continuing to monitor for any symptoms of possible jose.  Patient remains in fair behavioral control, there has been no aggression, no prns for aggression. No significant overnight issues.  Eating well.  Had difficulty falling asleep last night due to restlessness and negative feelings, took atarax PRN at 22:58 to help with sleep. Patient offers no complaints.   Conversation was had with patient in their room. Talked about their gender identity, and patient discussed how the use of "they/them" pronouns just "felt right" to them. Additionally, they feel accepted by their peers in the hospital of their nonbinary identity. Discussed how it might be difficult to present as "more femme" at home due to transphobic DEE DEE, but endorses the wife being accepting and supportive of their gender identity. They report decreased somatic symptoms, but still insists on visiting a GI doctor after discharge. Discussed how they feel guilty being at home and not contributing because they are unemployed.    Patient was feeling tired after last night. Discussed how they felt disappointed with wife as wife promised to visit yesterday and did not, causing them to be unable to sleep restfully. They denies SI/SIB/HI at this time, no plan or intent.  States they feel that being in the hospital has been beneficial and believes they are getting better each day. No overt psychotic trend.  Denies AVH, paranoia or delusions. VSS. Continue to monitor and provide therapeutic support.

## 2022-11-16 PROCEDURE — 99231 SBSQ HOSP IP/OBS SF/LOW 25: CPT

## 2022-11-16 RX ORDER — GABAPENTIN 400 MG/1
100 CAPSULE ORAL
Refills: 0 | Status: DISCONTINUED | OUTPATIENT
Start: 2022-11-16 | End: 2022-11-17

## 2022-11-16 RX ORDER — SERTRALINE 25 MG/1
150 TABLET, FILM COATED ORAL DAILY
Refills: 0 | Status: DISCONTINUED | OUTPATIENT
Start: 2022-11-16 | End: 2022-11-22

## 2022-11-16 RX ORDER — CLONAZEPAM 1 MG
0.25 TABLET ORAL
Refills: 0 | Status: DISCONTINUED | OUTPATIENT
Start: 2022-11-16 | End: 2022-11-19

## 2022-11-16 RX ADMIN — PANTOPRAZOLE SODIUM 40 MILLIGRAM(S): 20 TABLET, DELAYED RELEASE ORAL at 08:48

## 2022-11-16 RX ADMIN — Medication 1 MILLIGRAM(S): at 08:48

## 2022-11-16 RX ADMIN — SERTRALINE 125 MILLIGRAM(S): 25 TABLET, FILM COATED ORAL at 08:48

## 2022-11-16 RX ADMIN — GABAPENTIN 100 MILLIGRAM(S): 400 CAPSULE ORAL at 20:30

## 2022-11-16 RX ADMIN — Medication 1 MILLIGRAM(S): at 13:13

## 2022-11-16 RX ADMIN — Medication 0.25 MILLIGRAM(S): at 20:29

## 2022-11-16 NOTE — BH INPATIENT PSYCHIATRY PROGRESS NOTE - NSBHMETABOLIC_PSY_ALL_CORE_FT
BMI: BMI (kg/m2): 39.5 (11-09-22 @ 13:46)  HbA1c: A1C with Estimated Average Glucose Result: 4.9 % (11-10-22 @ 08:00)    Glucose: POCT Blood Glucose.: 101 mg/dL (12-15-21 @ 00:29)    BP:   Lipid Panel: Date/Time: 11-10-22 @ 08:00  Cholesterol, Serum: 209  Direct LDL: --  HDL Cholesterol, Serum: 26  Total Cholesterol/HDL Ration Measurement: --  Triglycerides, Serum: 105   BMI: BMI (kg/m2): 39.5 (11-09-22 @ 13:46)  HbA1c: A1C with Estimated Average Glucose Result: 4.9 % (11-10-22 @ 08:00)    Glucose: POCT Blood Glucose.: 101 mg/dL (12-15-21 @ 00:29)    BP: --  Lipid Panel: Date/Time: 11-10-22 @ 08:00  Cholesterol, Serum: 209  Direct LDL: --  HDL Cholesterol, Serum: 26  Total Cholesterol/HDL Ration Measurement: --  Triglycerides, Serum: 105   BMI: BMI (kg/m2): 39.5 (11-09-22 @ 13:46)  HbA1c: A1C with Estimated Average Glucose Result: 4.9 % (11-10-22 @ 08:00)    Glucose: POCT Blood Glucose.: 101 mg/dL (12-15-21 @ 00:29)    BP: 120/78 (11-16-22 @ 13:10) (120/78 - 120/78)  Lipid Panel: Date/Time: 11-10-22 @ 08:00  Cholesterol, Serum: 209  Direct LDL: --  HDL Cholesterol, Serum: 26  Total Cholesterol/HDL Ration Measurement: --  Triglycerides, Serum: 105

## 2022-11-16 NOTE — BH INPATIENT PSYCHIATRY PROGRESS NOTE - NSBHASSESSSUMMFT_PSY_ALL_CORE
32-year-old  nonbinary person unemployed living with spouse and her family, with diagnosis of MDD with history of one prior psychiatric hospitalization at [facility] from 7/21-7/29/2022 for suicidal thoughts, [facility] Aug [facility], currently on Lexapro, Ativan, and Propranolol, no history of self-injurious behavior or suicide attempts, no h/o violence or legal issues, PMH GERD and Asthma, no h/o substance abuse, referred by [facility] providers for SI and worsening depression/anxiety.     Pt described less irritability and overall improvement of somatic and psychiatric symptoms.  Will continue to monitor for changes in mood indicative of manic Sx. Pt. continues to demonstrate attenuating anxiety. Hold Zoloft titration.     Discussed with pt switching medication routine, pt agreed. Will switch ativan with klnopin and add gabapentin.    Plan:     Psychiatry:  Continue Zoloft 125mg--MDD  Klonopin --Anxiety  Propranolol 20mg TID--Anxiety  Gabapentin -- Anxiety & possible PTSD    MEDICATIONS  (PRN):  albuterol    90 MICROgram(s) HFA Inhaler 2 Puff(s) Inhalation every 6 hours PRN asthma  diphenhydrAMINE Injectable 50 milliGRAM(s) IntraMuscular once PRN Agitation  haloperidol     Tablet 5 milliGRAM(s) Oral every 6 hours PRN Agitation  haloperidol    Injectable 5 milliGRAM(s) IntraMuscular once PRN Agitation  hydrOXYzine hydrochloride 50 milliGRAM(s) Oral every 6 hours PRN Anxiety  LORazepam     Tablet 2 milliGRAM(s) Oral every 6 hours PRN Agitation  LORazepam   Injectable 2 milliGRAM(s) IntraMuscular once PRN Agitation    Observation: routine, pt. is not an acute risk for suicide while inpatient; no indication for CO    Legal: 9.13 voluntary admission    Medical: Asthma--albuterol rescue inhaler  GERD--protonix 40mg daily  Routine medical followup; TSH, lipid panel and HA1C in the morning  Sx reduction, medication management, safety planning, milieu therapy.  Medical: consult with hospitalist regarding reported GERD and esophageal blockage. Chest Xray was unremarkable, however, pt. reports Hx of EGD and esophageal dilation.     Dispo: pending 32-year-old  nonbinary person unemployed living with spouse and her family, with diagnosis of MDD with history of one prior psychiatric hospitalization at [facility] from 7/21-7/29/2022 for suicidal thoughts, [facility] Aug 2022, currently on Lexapro, Ativan, and Propranolol, no history of self-injurious behavior or suicide attempts, no h/o violence or legal issues, PMH GERD and Asthma, no h/o substance abuse, referred by [facility] providers for SI and worsening depression/anxiety.     Pt reports improved mood since yesterday. They report and demonstrate attenuation of anxiety; also reporting somatic Sx have essentially resolved. No concern for jose; irritability and poor sleep linked to disagreement with their wife.     Discussed with pt switching medication routine, pt agreed. Will switch ativan with klonopin in preparation for future taper and add gabapentin for additional anxiety management/PTSD. Titrate ZOloft.     Plan:     Psychiatry:  Continue Zoloft 150mg--MDD  Klonopin --Anxiety  Propranolol 20mg TID--Anxiety  Gabapentin 100mg -- Anxiety/PTSD    MEDICATIONS  (PRN):  albuterol    90 MICROgram(s) HFA Inhaler 2 Puff(s) Inhalation every 6 hours PRN asthma  diphenhydrAMINE Injectable 50 milliGRAM(s) IntraMuscular once PRN Agitation  haloperidol     Tablet 5 milliGRAM(s) Oral every 6 hours PRN Agitation  haloperidol    Injectable 5 milliGRAM(s) IntraMuscular once PRN Agitation  hydrOXYzine hydrochloride 50 milliGRAM(s) Oral every 6 hours PRN Anxiety  LORazepam     Tablet 2 milliGRAM(s) Oral every 6 hours PRN Agitation  LORazepam   Injectable 2 milliGRAM(s) IntraMuscular once PRN Agitation    Observation: routine, pt. is not an acute risk for suicide while inpatient; no indication for CO    Legal: 9.13 voluntary admission    Medical: Asthma--albuterol rescue inhaler  GERD--protonix 40mg daily  Routine medical followup; TSH, lipid panel and HA1C in the morning  Sx reduction, medication management, safety planning, milieu therapy.  Medical: consult with hospitalist regarding reported GERD and esophageal blockage. Chest Xray was unremarkable, however, pt. reports Hx of EGD and esophageal dilation.     Dispo: pending

## 2022-11-16 NOTE — BH INPATIENT PSYCHIATRY PROGRESS NOTE - NSBHFUPINTERVALHXFT_PSY_A_CORE
Patient is followed up for MDD, with somatic complaints.  Admitted for depression and passive suicide thoughts. Chart, medications and labs reviewed.  Patient is discussed with nursing staff.  No significant interval events. Nursing reports patient remains compliant with all standing medications and tolerating well. Patient is tolerating holding titration of Zoloft. No significant events overnight. Had no difficulty sleeping and did not request PRN atarax. Today, described they were no longer experiencing flu-like symptoms, and were having mild irritability. When asked if they had any intention of hurting others, they said "I don't want to throw anyone out the window, but sometimes I feel like some people need a bonk to the head." Will continue to monitor side effects, especially monitoring irritability and possible manic side effects. Discussed with patient feelings towards switching the ativan to a longer-last anxiolytic like klonopin and they were agreeable. Also discussed adding gabapentin to the medication routine, and they were agreeable.   Conversation was had with patient in their room. Talked about their gender identity, how they want to explore more "femme clothing," and how they have a friend who is sending them a box of old clothing. They also commented how their wife also lets them borrow clothing from her closet. Their wife visited last night and they said it was "nice" and overall a positive experience. They express a lot of motivation once they are discharged to search for a part-time position and possibly moving out of wife's parents house. They report further decreased somatic symptoms, only feeling symptoms "here and there" but they are slight and not causing distress.   They denies SI/SIB/HI at this time, no plan or intent.  States they feel that being in the hospital has been beneficial and believes they are getting better each day. No overt psychotic trend.  Denies AVH, paranoia or delusions. VSS. Continue to monitor and provide therapeutic support.   Patient is followed up for MDD, with somatic complaints.  Admitted for depression and passive suicide thoughts. Chart, medications and labs reviewed.  Patient is discussed with nursing staff.  No significant interval events. Nursing reports patient remains compliant with all standing medications and tolerating well. Patient is tolerating holding titration of Zoloft. No significant events overnight. Had no difficulty sleeping and did not request PRN atarax. Today, described they were no longer experiencing flu-like symptoms, and were having mild irritability. When asked if they had any intention of hurting others, they said "I don't want to throw anyone out the window, but sometimes I feel like some people need a bonk to the head." Will continue to monitor side effects, especially monitoring irritability and possible manic side effects. Discussed with patient feelings towards switching the ativan to a longer-last anxiolytic like klonopin and they were agreeable. Also discussed adding gabapentin to the medication routine, and they were agreeable.   Conversation was had with patient in their room. Talked about their gender identity, how they want to explore more "femme clothing," and how they have a friend who is sending them a box of old clothing. They also commented how their wife also lets them borrow clothing from her closet. Their wife visited last night and they said it was "nice" and overall a positive experience. They express a lot of motivation once they are discharged to search for a part-time position and possibly moving out of wife's parents house. They report further decreased somatic symptoms, only feeling symptoms "here and there" but they are slight and not causing distress.   Denies SI/SIB/HI at this time, no plan or intent.  States they feel that being in the hospital has been beneficial and believes they are getting better each day. No overt psychotic trend.  Denies AVH, paranoia or delusions. VSS. Continue to monitor and provide therapeutic support.

## 2022-11-16 NOTE — BH INPATIENT PSYCHIATRY PROGRESS NOTE - NSBHCHARTREVIEWVS_PSY_A_CORE FT
Vital Signs Last 24 Hrs  T(C): 36.2 (11-16-22 @ 06:36), Max: 36.8 (11-15-22 @ 19:57)  T(F): 97.2 (11-16-22 @ 06:36), Max: 98.3 (11-15-22 @ 19:57)  HR: --  BP: --  BP(mean): --  RR: --  SpO2: --    11-14-22 @ 19:21  Lying BP: --/-- HR: --  Sitting BP: 116/69 HR: 69  Standing BP: 115/67 HR: 77  Site: --  Mode: --   Vital Signs Last 24 Hrs  T(C): 36.2 (11-16-22 @ 06:36), Max: 36.8 (11-15-22 @ 19:57)  T(F): 97.2 (11-16-22 @ 06:36), Max: 98.3 (11-15-22 @ 19:57)  HR: --  BP: --  BP(mean): --  RR: --  SpO2: --    Orthostatic VS  11-16-22 @ 08:08  Lying BP: --/-- HR: --  Sitting BP: 128/70 HR: 66  Standing BP: 112/66 HR: 80  Site: --  Mode: --  Orthostatic VS  11-15-22 @ 19:57  Lying BP: --/-- HR: --  Sitting BP: 122/68 HR: --  Standing BP: 110/63 HR: 82  Site: --  Mode: --  Orthostatic VS  11-15-22 @ 08:01  Lying BP: --/-- HR: --  Sitting BP: 117/72 HR: 66  Standing BP: 119/80 HR: 87  Site: --  Mode: --  Orthostatic VS  11-14-22 @ 19:21  Lying BP: --/-- HR: --  Sitting BP: 116/69 HR: 69  Standing BP: 115/67 HR: 77  Site: --  Mode: --   Vital Signs Last 24 Hrs  T(C): 36.9 (11-16-22 @ 14:42), Max: 36.9 (11-16-22 @ 14:42)  T(F): 98.4 (11-16-22 @ 14:42), Max: 98.4 (11-16-22 @ 14:42)  HR: 74 (11-16-22 @ 13:10) (74 - 74)  BP: 120/78 (11-16-22 @ 13:10) (120/78 - 120/78)  BP(mean): --  RR: 18 (11-16-22 @ 13:10) (18 - 18)  SpO2: 100% (11-16-22 @ 13:10) (100% - 100%)    Orthostatic VS  11-16-22 @ 08:08  Lying BP: --/-- HR: --  Sitting BP: 128/70 HR: 66  Standing BP: 112/66 HR: 80  Site: --  Mode: --  Orthostatic VS  11-15-22 @ 19:57  Lying BP: --/-- HR: --  Sitting BP: 122/68 HR: --  Standing BP: 110/63 HR: 82  Site: --  Mode: --  Orthostatic VS  11-15-22 @ 08:01  Lying BP: --/-- HR: --  Sitting BP: 117/72 HR: 66  Standing BP: 119/80 HR: 87  Site: --  Mode: --  Orthostatic VS  11-14-22 @ 19:21  Lying BP: --/-- HR: --  Sitting BP: 116/69 HR: 69  Standing BP: 115/67 HR: 77  Site: --  Mode: --

## 2022-11-17 PROCEDURE — 99232 SBSQ HOSP IP/OBS MODERATE 35: CPT | Mod: 25

## 2022-11-17 PROCEDURE — 90853 GROUP PSYCHOTHERAPY: CPT

## 2022-11-17 RX ORDER — ONDANSETRON 8 MG/1
8 TABLET, FILM COATED ORAL EVERY 8 HOURS
Refills: 0 | Status: DISCONTINUED | OUTPATIENT
Start: 2022-11-17 | End: 2022-11-22

## 2022-11-17 RX ORDER — LITHIUM CARBONATE 300 MG/1
300 TABLET, EXTENDED RELEASE ORAL
Refills: 0 | Status: DISCONTINUED | OUTPATIENT
Start: 2022-11-17 | End: 2022-11-19

## 2022-11-17 RX ADMIN — GABAPENTIN 100 MILLIGRAM(S): 400 CAPSULE ORAL at 08:53

## 2022-11-17 RX ADMIN — PANTOPRAZOLE SODIUM 40 MILLIGRAM(S): 20 TABLET, DELAYED RELEASE ORAL at 08:54

## 2022-11-17 RX ADMIN — LITHIUM CARBONATE 300 MILLIGRAM(S): 300 TABLET, EXTENDED RELEASE ORAL at 21:07

## 2022-11-17 RX ADMIN — Medication 2 MILLIGRAM(S): at 17:19

## 2022-11-17 RX ADMIN — SERTRALINE 150 MILLIGRAM(S): 25 TABLET, FILM COATED ORAL at 08:53

## 2022-11-17 RX ADMIN — Medication 0.25 MILLIGRAM(S): at 08:53

## 2022-11-17 NOTE — BH INPATIENT PSYCHIATRY PROGRESS NOTE - CURRENT MEDICATION
MEDICATIONS  (STANDING):  clonazePAM Oral Disintegrating Tablet 0.25 milliGRAM(s) Oral two times a day  gabapentin 100 milliGRAM(s) Oral two times a day  influenza   Vaccine 0.5 milliLiter(s) IntraMuscular once  pantoprazole    Tablet 40 milliGRAM(s) Oral before breakfast  propranolol 20 milliGRAM(s) Oral three times a day  sertraline 150 milliGRAM(s) Oral daily    MEDICATIONS  (PRN):  albuterol    90 MICROgram(s) HFA Inhaler 2 Puff(s) Inhalation every 6 hours PRN asthma  diphenhydrAMINE Injectable 50 milliGRAM(s) IntraMuscular once PRN Agitation  haloperidol     Tablet 5 milliGRAM(s) Oral every 6 hours PRN Agitation  haloperidol    Injectable 5 milliGRAM(s) IntraMuscular once PRN Agitation  hydrOXYzine hydrochloride 50 milliGRAM(s) Oral every 6 hours PRN Anxiety  LORazepam     Tablet 2 milliGRAM(s) Oral every 6 hours PRN Agitation  LORazepam   Injectable 2 milliGRAM(s) IntraMuscular once PRN Agitation   MEDICATIONS  (STANDING):  clonazePAM Oral Disintegrating Tablet 0.25 milliGRAM(s) Oral two times a day  influenza   Vaccine 0.5 milliLiter(s) IntraMuscular once  lithium SR (LITHOBID) 300 milliGRAM(s) Oral two times a day  pantoprazole    Tablet 40 milliGRAM(s) Oral before breakfast  propranolol 20 milliGRAM(s) Oral three times a day  sertraline 150 milliGRAM(s) Oral daily    MEDICATIONS  (PRN):  albuterol    90 MICROgram(s) HFA Inhaler 2 Puff(s) Inhalation every 6 hours PRN asthma  diphenhydrAMINE Injectable 50 milliGRAM(s) IntraMuscular once PRN Agitation  haloperidol     Tablet 5 milliGRAM(s) Oral every 6 hours PRN Agitation  haloperidol    Injectable 5 milliGRAM(s) IntraMuscular once PRN Agitation  hydrOXYzine hydrochloride 50 milliGRAM(s) Oral every 6 hours PRN Anxiety  LORazepam     Tablet 2 milliGRAM(s) Oral every 6 hours PRN Agitation  LORazepam   Injectable 2 milliGRAM(s) IntraMuscular once PRN Agitation  ondansetron    Tablet 8 milliGRAM(s) Oral every 8 hours PRN nausea

## 2022-11-17 NOTE — BH INPATIENT PSYCHIATRY PROGRESS NOTE - NSBHFUPINTERVALHXFT_PSY_A_CORE
Patient is followed up for MDD, with somatic complaints.  Admitted for depression and passive suicide thoughts. Chart, medications and labs reviewed.  Patient is discussed with nursing staff.  No significant interval events. Nursing reports patient remains compliant with all standing medications and tolerating well. Patient is tolerating holding titration of Zoloft. State that last night they were feeling really restless. When asked to describe the restlessness, they stated they just "didn't feel tired" and felt "wired." Although they didn't get much sleep (unsure how long they slept), they feel very energetic today. They also disclosed that their mother has bipolar disorder. Because of these symptoms may be a result of potential jose, we discussed the addition of a mood stabilizer to their medication. They spoke to their mother who said she used to take lithium but it gave her bad side effects (tremor, diaphoresis, GI issues). They decided they want to move forward with the [Lamictal/lithium], replacing the gabapentin. The irritability has decreased but is still there.   Conversation was had with patient in their room. Spoke about their conversation with their wife yesterday where she was supported about them starting estrogen/HRT. Discussed wanting to change PCP as they are not sure if PCP would be willing to manage their HRT. Spoke about potentially finding a new PCP for them and referring them to [facility], to which they were agreeable. Overall, feeling happy about figuring out their identity and hopeful about the future.  Denies SI/SIB/HI at this time, no plan or intent.  States they feel that being in the hospital has been beneficial and believes they are getting better each day. No overt psychotic trend.  Denies AVH, paranoia or delusions. VSS. Continue to monitor and provide therapeutic support.

## 2022-11-17 NOTE — BH INPATIENT PSYCHIATRY PROGRESS NOTE - NSBHASSESSSUMMFT_PSY_ALL_CORE
32-year-old  nonbinary person unemployed living with spouse and her family, with diagnosis of MDD with history of one prior psychiatric hospitalization at [facility] from 7/21-7/29/2022 for suicidal thoughts, [facility] Aug 2022, currently on Lexapro, Ativan, and Propranolol, no history of self-injurious behavior or suicide attempts, no h/o violence or legal issues, PMH GERD and Asthma, no h/o substance abuse, referred by [facility] providers for SI and worsening depression/anxiety.     Pt reports increased energy and inability to sleep last night, possible symptoms of lizzeth. Discussed with pt adding a mood stabilizer, pt agreed. Will continue to monitor for additional symptoms of lizzeth.    Discussed with pt switching medication routine, pt agreed. Will switch gabapentin to a mood stabilizer to treat possible drug-induced lizzeth. Titrate Zoloft.     Plan:     Psychiatry:  Continue Zoloft 150mg--MDD  Klonopin -- Anxiety  Propranolol 20mg TID--Anxiety  Mood stabilizer -- ?Lizzeth    MEDICATIONS  (PRN):  albuterol    90 MICROgram(s) HFA Inhaler 2 Puff(s) Inhalation every 6 hours PRN asthma  diphenhydrAMINE Injectable 50 milliGRAM(s) IntraMuscular once PRN Agitation  haloperidol     Tablet 5 milliGRAM(s) Oral every 6 hours PRN Agitation  haloperidol    Injectable 5 milliGRAM(s) IntraMuscular once PRN Agitation  hydrOXYzine hydrochloride 50 milliGRAM(s) Oral every 6 hours PRN Anxiety  LORazepam     Tablet 2 milliGRAM(s) Oral every 6 hours PRN Agitation  LORazepam   Injectable 2 milliGRAM(s) IntraMuscular once PRN Agitation    Observation: routine, pt. is not an acute risk for suicide while inpatient; no indication for CO    Legal: 9.13 voluntary admission    Medical: Asthma--albuterol rescue inhaler  GERD--protonix 40mg daily  Routine medical followup; TSH, lipid panel and HA1C in the morning  Sx reduction, medication management, safety planning, milieu therapy.  Medical: consult with hospitalist regarding reported GERD and esophageal blockage. Chest Xray was unremarkable, however, pt. reports Hx of EGD and esophageal dilation.     Dispo: pending 32-year-old  nonbinary person unemployed living with spouse and her family, with diagnosis of MDD with history of one prior psychiatric hospitalization at [facility] from 7/21-7/29/2022 for suicidal thoughts, [facility] Aug 2022, currently on Lexapro, Ativan, and Propranolol, no history of self-injurious behavior or suicide attempts, no h/o violence or legal issues, PMH GERD and Asthma, no h/o substance abuse, referred by [facility] providers for SI and worsening depression/anxiety.     Pt reports increased energy and inability to sleep last night, possible symptoms of lizzeth. Discussed with pt adding a mood stabilizer, pt agreed. Will continue to monitor for additional symptoms of lizzeth.    Discussed with pt switching medication routine, pt agreed. Will discontinue gabapentin and start lithium to treat possible drug-induced lizzeth. Continue other medications. Pt. to assess if he requires titration of Klonopin for acute anxiety post Ativan taper and switch to Klonopin.    Plan:     Psychiatry:  Continue Zoloft 150mg--MDD  Klonopin 0.25mg BID -- Anxiety  Propranolol 20mg TID--Anxiety  Lithobid 300mg BID -- Lizzeth    MEDICATIONS  (PRN):  albuterol    90 MICROgram(s) HFA Inhaler 2 Puff(s) Inhalation every 6 hours PRN asthma  diphenhydrAMINE Injectable 50 milliGRAM(s) IntraMuscular once PRN Agitation  haloperidol     Tablet 5 milliGRAM(s) Oral every 6 hours PRN Agitation  haloperidol    Injectable 5 milliGRAM(s) IntraMuscular once PRN Agitation  hydrOXYzine hydrochloride 50 milliGRAM(s) Oral every 6 hours PRN Anxiety  LORazepam     Tablet 2 milliGRAM(s) Oral every 6 hours PRN Agitation  LORazepam   Injectable 2 milliGRAM(s) IntraMuscular once PRN Agitation    Observation: routine, pt. is not an acute risk for suicide while inpatient; no indication for CO    Legal: 9.13 voluntary admission    Medical: Asthma--albuterol rescue inhaler  GERD--protonix 40mg daily  Routine medical followup; TSH, lipid panel and HA1C in the morning  Sx reduction, medication management, safety planning, milieu therapy.  Medical: consult with hospitalist regarding reported GERD and esophageal blockage. Chest Xray was unremarkable, however, pt. reports Hx of EGD and esophageal dilation.     Dispo: pending

## 2022-11-17 NOTE — BH INPATIENT PSYCHIATRY PROGRESS NOTE - NSBHCHARTREVIEWVS_PSY_A_CORE FT
Vital Signs Last 24 Hrs  T(C): 36.1 (11-17-22 @ 05:58), Max: 36.9 (11-16-22 @ 14:42)  T(F): 97 (11-17-22 @ 05:58), Max: 98.4 (11-16-22 @ 14:42)  HR: 74 (11-17-22 @ 08:10) (74 - 74)  BP: 116/64 (11-17-22 @ 08:10) (116/64 - 120/78)  BP(mean): --  RR: 18 (11-16-22 @ 13:10) (18 - 18)  SpO2: 100% (11-16-22 @ 13:10) (100% - 100%)    Orthostatic VS  11-16-22 @ 19:14  Lying BP: --/-- HR: --  Sitting BP: 119/73 HR: 65  Standing BP: 112/75 HR: 69  Site: --  Mode: --  Orthostatic VS  11-16-22 @ 08:08  Lying BP: --/-- HR: --  Sitting BP: 128/70 HR: 66  Standing BP: 112/66 HR: 80  Site: --  Mode: --  Orthostatic VS  11-15-22 @ 19:57  Lying BP: --/-- HR: --  Sitting BP: 122/68 HR: --  Standing BP: 110/63 HR: 82  Site: --  Mode: --   Vital Signs Last 24 Hrs  T(C): 36.7 (11-17-22 @ 14:17), Max: 36.7 (11-16-22 @ 19:14)  T(F): 98.1 (11-17-22 @ 14:17), Max: 98.1 (11-17-22 @ 14:17)  HR: 90 (11-17-22 @ 12:25) (74 - 90)  BP: 119/80 (11-17-22 @ 12:25) (116/64 - 119/80)  BP(mean): --  RR: --  SpO2: --    Orthostatic VS  11-16-22 @ 19:14  Lying BP: --/-- HR: --  Sitting BP: 119/73 HR: 65  Standing BP: 112/75 HR: 69  Site: --  Mode: --  Orthostatic VS  11-16-22 @ 08:08  Lying BP: --/-- HR: --  Sitting BP: 128/70 HR: 66  Standing BP: 112/66 HR: 80  Site: --  Mode: --  Orthostatic VS  11-15-22 @ 19:57  Lying BP: --/-- HR: --  Sitting BP: 122/68 HR: --  Standing BP: 110/63 HR: 82  Site: --  Mode: --

## 2022-11-17 NOTE — BH INPATIENT PSYCHIATRY PROGRESS NOTE - NSBHMETABOLIC_PSY_ALL_CORE_FT
BMI: BMI (kg/m2): 39.5 (11-09-22 @ 13:46)  HbA1c: A1C with Estimated Average Glucose Result: 4.9 % (11-10-22 @ 08:00)    Glucose: POCT Blood Glucose.: 101 mg/dL (12-15-21 @ 00:29)    BP: 116/64 (11-17-22 @ 08:10) (116/64 - 120/78)  Lipid Panel: Date/Time: 11-10-22 @ 08:00  Cholesterol, Serum: 209  Direct LDL: --  HDL Cholesterol, Serum: 26  Total Cholesterol/HDL Ration Measurement: --  Triglycerides, Serum: 105   BMI: BMI (kg/m2): 39.5 (11-09-22 @ 13:46)  HbA1c: A1C with Estimated Average Glucose Result: 4.9 % (11-10-22 @ 08:00)    Glucose: POCT Blood Glucose.: 101 mg/dL (12-15-21 @ 00:29)    BP: 119/80 (11-17-22 @ 12:25) (116/64 - 120/78)  Lipid Panel: Date/Time: 11-10-22 @ 08:00  Cholesterol, Serum: 209  Direct LDL: --  HDL Cholesterol, Serum: 26  Total Cholesterol/HDL Ration Measurement: --  Triglycerides, Serum: 105

## 2022-11-17 NOTE — BH INPATIENT PSYCHIATRY PROGRESS NOTE - PRN MEDS
MEDICATIONS  (PRN):  albuterol    90 MICROgram(s) HFA Inhaler 2 Puff(s) Inhalation every 6 hours PRN asthma  diphenhydrAMINE Injectable 50 milliGRAM(s) IntraMuscular once PRN Agitation  haloperidol     Tablet 5 milliGRAM(s) Oral every 6 hours PRN Agitation  haloperidol    Injectable 5 milliGRAM(s) IntraMuscular once PRN Agitation  hydrOXYzine hydrochloride 50 milliGRAM(s) Oral every 6 hours PRN Anxiety  LORazepam     Tablet 2 milliGRAM(s) Oral every 6 hours PRN Agitation  LORazepam   Injectable 2 milliGRAM(s) IntraMuscular once PRN Agitation   MEDICATIONS  (PRN):  albuterol    90 MICROgram(s) HFA Inhaler 2 Puff(s) Inhalation every 6 hours PRN asthma  diphenhydrAMINE Injectable 50 milliGRAM(s) IntraMuscular once PRN Agitation  haloperidol     Tablet 5 milliGRAM(s) Oral every 6 hours PRN Agitation  haloperidol    Injectable 5 milliGRAM(s) IntraMuscular once PRN Agitation  hydrOXYzine hydrochloride 50 milliGRAM(s) Oral every 6 hours PRN Anxiety  LORazepam     Tablet 2 milliGRAM(s) Oral every 6 hours PRN Agitation  LORazepam   Injectable 2 milliGRAM(s) IntraMuscular once PRN Agitation  ondansetron    Tablet 8 milliGRAM(s) Oral every 8 hours PRN nausea

## 2022-11-17 NOTE — BH PSYCHOLOGY - CLINICIAN PSYCHOTHERAPY NOTE - NSBHPSYCHOLNARRATIVE_PSY_A_CORE FT
Writer and patient wore masks during the session due to COVID precautions. Pt was alert and appeared to have adequate hygiene and grooming. Pt denied any current suicidal ideation, intent, or plan, and did not endorse any homicidal ideation. Pt reported "anxious" mood and displayed congruent affect. Speech within normal limits. Thought processes linear and goal directed. Pt denied any auditory or visual hallucinations. Oriented x3. Pt demonstrated fair insight and judgment.     Pt reported increase in anxiety, which he attributed to recent medication change. Writer offered safe space to explore anxiety, including ways in which it manifests somatically. Writer worked with pt to consider coping skills to address anxiety. Role of acceptance also explored. Support, validation, and empathic listening provided.  Writer and patient wore masks during the session due to COVID precautions. Pt was alert and appeared to have adequate hygiene and grooming. Pt denied any current suicidal ideation, intent, or plan, and did not endorse any homicidal ideation. Pt reported "anxious" mood and displayed congruent affect. Speech within normal limits. Thought processes linear and goal directed. Pt denied any auditory or visual hallucinations. Oriented x3. Pt demonstrated fair insight and judgment.     Pt reported increase in anxiety, which they attributed to recent medication change. Writer offered safe space to explore anxiety, including ways in which it manifests somatically. Writer worked with pt to consider coping skills to address anxiety. Role of acceptance also explored. Support, validation, and empathic listening provided.

## 2022-11-18 RX ADMIN — Medication 0.25 MILLIGRAM(S): at 21:15

## 2022-11-18 RX ADMIN — SERTRALINE 150 MILLIGRAM(S): 25 TABLET, FILM COATED ORAL at 08:41

## 2022-11-18 RX ADMIN — LITHIUM CARBONATE 300 MILLIGRAM(S): 300 TABLET, EXTENDED RELEASE ORAL at 08:42

## 2022-11-18 RX ADMIN — LITHIUM CARBONATE 300 MILLIGRAM(S): 300 TABLET, EXTENDED RELEASE ORAL at 21:15

## 2022-11-18 RX ADMIN — PANTOPRAZOLE SODIUM 40 MILLIGRAM(S): 20 TABLET, DELAYED RELEASE ORAL at 08:41

## 2022-11-18 RX ADMIN — Medication 0.25 MILLIGRAM(S): at 08:42

## 2022-11-18 NOTE — BH INPATIENT PSYCHIATRY PROGRESS NOTE - NSBHMETABOLIC_PSY_ALL_CORE_FT
BMI: BMI (kg/m2): 39.5 (11-09-22 @ 13:46)  HbA1c: A1C with Estimated Average Glucose Result: 4.9 % (11-10-22 @ 08:00)    Glucose: POCT Blood Glucose.: 101 mg/dL (12-15-21 @ 00:29)    BP: 122/88 (11-17-22 @ 21:00) (116/64 - 126/87)  Lipid Panel: Date/Time: 11-10-22 @ 08:00  Cholesterol, Serum: 209  Direct LDL: --  HDL Cholesterol, Serum: 26  Total Cholesterol/HDL Ration Measurement: --  Triglycerides, Serum: 105

## 2022-11-18 NOTE — BH INPATIENT PSYCHIATRY PROGRESS NOTE - NSBHATTESTCOMMENTATTENDFT_PSY_A_CORE
Edited above directly for clarity. Acute on chronic depression with significant contribution from PTSD (childhood trauma), somatic preoccupation, social anxiety r/o neurodivergent, distress regarding family opposition to gender identity. Agree with SSRI switch for titration to address OCD. Will consider gabapentin trial. 
Edited above directly for clarity. Acute on chronic depression with significant contribution from PTSD (childhood trauma), somatic preoccupation, social anxiety r/o neurodivergent, distress regarding family opposition to gender identity. Agree with SSRI switch for titration to address OCD. Will consider gabapentin trial. 
Edited the above directly for consistency. Consistent with improving MDD with panic, complicated by PTSD, additional stress related to conflict with in-laws re queer identity, possible GI SEs to lithium, discontinued, resuming lorazepam and discontinuing clonazepam, continuing sertraline titration.

## 2022-11-18 NOTE — BH INPATIENT PSYCHIATRY PROGRESS NOTE - CURRENT MEDICATION
MEDICATIONS  (STANDING):  clonazePAM Oral Disintegrating Tablet 0.25 milliGRAM(s) Oral two times a day  influenza   Vaccine 0.5 milliLiter(s) IntraMuscular once  lithium SR (LITHOBID) 300 milliGRAM(s) Oral two times a day  pantoprazole    Tablet 40 milliGRAM(s) Oral before breakfast  propranolol 20 milliGRAM(s) Oral three times a day  sertraline 150 milliGRAM(s) Oral daily    MEDICATIONS  (PRN):  albuterol    90 MICROgram(s) HFA Inhaler 2 Puff(s) Inhalation every 6 hours PRN asthma  diphenhydrAMINE Injectable 50 milliGRAM(s) IntraMuscular once PRN Agitation  haloperidol     Tablet 5 milliGRAM(s) Oral every 6 hours PRN Agitation  haloperidol    Injectable 5 milliGRAM(s) IntraMuscular once PRN Agitation  hydrOXYzine hydrochloride 50 milliGRAM(s) Oral every 6 hours PRN Anxiety  LORazepam     Tablet 2 milliGRAM(s) Oral every 6 hours PRN Agitation  LORazepam   Injectable 2 milliGRAM(s) IntraMuscular once PRN Agitation  ondansetron    Tablet 8 milliGRAM(s) Oral every 8 hours PRN nausea

## 2022-11-18 NOTE — BH INPATIENT PSYCHIATRY PROGRESS NOTE - NSBHFUPINTERVALHXFT_PSY_A_CORE
Patient is followed up for MDD, with somatic complaints.  Admitted for depression and passive suicide thoughts. Chart, medications and labs reviewed.  Patient is discussed with nursing staff.  No significant interval events. Nursing reports patient remains compliant with all standing medications and tolerating well. Patient is tolerating holding titration of Zoloft. Yesterday, around 5pm, had a panic attack after group therapy. Was given 2mg of ativan which helped calm them down. When asked what triggered the panic attack, they were feeling anxious prior to group therapy and shared during group their journey with their gender identity. After, wasn't sure if they shared too much, began to spiral, and had a panic attack. Wasn't able to sleep last night, stated they had 4 hours of nonconsecutive sleep as well as vivid "abstract" dreams. Today, they felt "out of it," "anxious, shaky, cold" and were experiencing some abdominal aches/cramps, nausea, and less of a desire to eat. Endorsed feeling somatic symptom of something stuck in their throat. Did have diarrhea yesterday and had non-solid stool this morning. Overall, feeling "hopeless" today about their prognosis and that they're going to be "stuck here forever."   Conversation was had with patient in their room. Noted that they were wearing less feminine clothing, just didn't feel good today.  Denies SI/SIB/HI at this time, no plan or intent.  States they feel that being in the hospital has been beneficial and believes they are getting better each day, recognizes that changes in mood may be due to medication and that this mood is temporary. No overt psychotic trend.  Denies AVH, paranoia or delusions. VSS. Continue to monitor and provide therapeutic support. Patient is followed up for MDD, with somatic complaints.  Admitted for depression and passive suicide thoughts. Chart, medications and labs reviewed.  Patient is discussed with nursing staff.  No significant interval events. Nursing reports patient remains compliant with all standing medications and tolerating well.     Patient is tolerating holding titration of Zoloft. Yesterday, around 5pm, had a panic attack after group therapy. Was given 2mg of ativan which helped calm them down. When asked what triggered the panic attack, they were feeling anxious prior to group therapy and shared during group their journey with their gender identity. After, wasn't sure if they shared too much, began to spiral, and had a panic attack. Difficulty sleeping last night, stated they had 4 hours of nonconsecutive sleep as well as vivid "abstract" dreams. Today, they felt "out of it," "anxious, shaky, cold" and were experiencing some abdominal aches/cramps, nausea, and less of a desire to eat. Endorsed feeling somatic symptom of something stuck in their throat. Did have diarrhea yesterday and had non-solid stool this morning. Discussed possibility of GI symptoms as resulting from medication (eg lithium) vs pattern of manifestation of anxiety. Overall, feeling "hopeless" today about their prognosis and that they're going to be "stuck here forever." Denies SI/SIB/HI at this time, no plan or intent.      Conversation was had with patient in their room. Noted that they were wearing less feminine clothing, just didn't feel good today including increased stress regarding expressing themselves as more feminine in front of others. States they feel that being in the hospital has been beneficial and believes they have getting better each day despite feeling worse yesterday, recognizes that changes in mood may be due to medication which may be temporary, discussed possibility that the worsening of mood may have been contributed to by stress regarding coming out to peers in group, patient acknowledging likely contribution. No overt psychotic trend.  Denies AVH, paranoia or delusions. VSS. Continue to monitor and provide therapeutic support.

## 2022-11-18 NOTE — BH INPATIENT PSYCHIATRY PROGRESS NOTE - PRN MEDS
MEDICATIONS  (PRN):  albuterol    90 MICROgram(s) HFA Inhaler 2 Puff(s) Inhalation every 6 hours PRN asthma  diphenhydrAMINE Injectable 50 milliGRAM(s) IntraMuscular once PRN Agitation  haloperidol     Tablet 5 milliGRAM(s) Oral every 6 hours PRN Agitation  haloperidol    Injectable 5 milliGRAM(s) IntraMuscular once PRN Agitation  hydrOXYzine hydrochloride 50 milliGRAM(s) Oral every 6 hours PRN Anxiety  LORazepam     Tablet 2 milliGRAM(s) Oral every 6 hours PRN Agitation  LORazepam   Injectable 2 milliGRAM(s) IntraMuscular once PRN Agitation  ondansetron    Tablet 8 milliGRAM(s) Oral every 8 hours PRN nausea

## 2022-11-18 NOTE — BH INPATIENT PSYCHIATRY PROGRESS NOTE - NSBHCHARTREVIEWVS_PSY_A_CORE FT
Vital Signs Last 24 Hrs  T(C): 36.4 (11-18-22 @ 06:17), Max: 37 (11-17-22 @ 19:31)  T(F): 97.5 (11-18-22 @ 06:17), Max: 98.6 (11-17-22 @ 19:31)  HR: 97 (11-17-22 @ 21:00) (75 - 97)  BP: 122/88 (11-17-22 @ 21:00) (119/80 - 126/87)  BP(mean): --  RR: 18 (11-17-22 @ 17:05) (18 - 18)  SpO2: --    Orthostatic VS  11-18-22 @ 08:01  Lying BP: --/-- HR: --  Sitting BP: 121/75 HR: 72  Standing BP: 139/84 HR: 89  Site: --  Mode: --  Orthostatic VS  11-17-22 @ 19:31  Lying BP: --/-- HR: --  Sitting BP: 118/78 HR: 82  Standing BP: 112/82 HR: 90  Site: --  Mode: --  Orthostatic VS  11-16-22 @ 19:14  Lying BP: --/-- HR: --  Sitting BP: 119/73 HR: 65  Standing BP: 112/75 HR: 69  Site: --  Mode: --   Vital Signs Last 24 Hrs  T(C): 36.4 (11-18-22 @ 06:17), Max: 37 (11-17-22 @ 19:31)  T(F): 97.5 (11-18-22 @ 06:17), Max: 98.6 (11-17-22 @ 19:31)  HR: 97 (11-17-22 @ 21:00) (75 - 97)  BP: 122/88 (11-17-22 @ 21:00) (122/88 - 126/87)  BP(mean): --  RR: 18 (11-17-22 @ 17:05) (18 - 18)  SpO2: --    Orthostatic VS  11-18-22 @ 08:01  Lying BP: --/-- HR: --  Sitting BP: 121/75 HR: 72  Standing BP: 139/84 HR: 89  Site: --  Mode: --  Orthostatic VS  11-17-22 @ 19:31  Lying BP: --/-- HR: --  Sitting BP: 118/78 HR: 82  Standing BP: 112/82 HR: 90  Site: --  Mode: --  Orthostatic VS  11-16-22 @ 19:14  Lying BP: --/-- HR: --  Sitting BP: 119/73 HR: 65  Standing BP: 112/75 HR: 69  Site: --  Mode: --

## 2022-11-18 NOTE — BH INPATIENT PSYCHIATRY PROGRESS NOTE - NSBHASSESSSUMMFT_PSY_ALL_CORE
32-year-old  nonbinary person unemployed living with spouse and her family, with diagnosis of MDD with history of one prior psychiatric hospitalization at [facility] from 7/21-7/29/2022 for suicidal thoughts, treated [facility] since Aug 2022, currently on Lexapro, Ativan, and Propranolol, no history of self-injurious behavior or suicide attempts, no h/o violence or legal issues, PMH GERD and Asthma, no h/o substance abuse, referred by [facility] providers for SI and worsening depression/anxiety.     Pt reports panic attack yesterday, increased feelings of anxiety, hopelessness, and sadness. Pt also reports GI symptoms - nausea, anorexia, esophageal tightening, stomach ache/cramps, diarrhea. Will monitor pt to determine if GI symptoms are side effects from addition of lithium or more somatic symptoms from increased anxiety.    Plan:     Psychiatry:  Continue Zoloft 150mg--MDD  Klonopin 0.25mg BID -- Anxiety  Propranolol 20mg TID--Anxiety  Lithobid 300mg BID -- [place]    MEDICATIONS  (PRN):  albuterol    90 MICROgram(s) HFA Inhaler 2 Puff(s) Inhalation every 6 hours PRN asthma  diphenhydrAMINE Injectable 50 milliGRAM(s) IntraMuscular once PRN Agitation  haloperidol     Tablet 5 milliGRAM(s) Oral every 6 hours PRN Agitation  haloperidol    Injectable 5 milliGRAM(s) IntraMuscular once PRN Agitation  hydrOXYzine hydrochloride 50 milliGRAM(s) Oral every 6 hours PRN Anxiety  LORazepam     Tablet 2 milliGRAM(s) Oral every 6 hours PRN Agitation  LORazepam   Injectable 2 milliGRAM(s) IntraMuscular once PRN Agitation    Observation: routine, pt. is not an acute risk for suicide while inpatient; no indication for CO    Legal: 9.13 voluntary admission    Medical: Asthma--albuterol rescue inhaler  GERD--protonix 40mg daily  Routine medical followup; TSH, lipid panel and HA1C in the morning  Sx reduction, medication management, safety planning, milieu therapy.  Medical: consult with hospitalist regarding reported GERD and esophageal blockage. Chest Xray was unremarkable, however, pt. reports Hx of EGD and esophageal dilation.     Dispo: pending 32-year-old  nonbinary person unemployed living with spouse and her family, with diagnosis of MDD with history of one prior psychiatric hospitalization at [facility] from 7/21-7/29/2022 for suicidal thoughts, treated at [facility] since Aug 2022, currently on Lexapro, Ativan, and Propranolol, no history of self-injurious behavior or suicide attempts, no h/o violence or legal issues, PMH GERD and Asthma, no h/o substance abuse, referred by [facility] providers for SI and worsening depression/anxiety.     Pt reports panic attack yesterday, increased feelings of anxiety, hopelessness, and sadness. Pt also reports GI symptoms - nausea, anorexia, esophageal tightening, stomach ache/cramps, diarrhea. Will d/c Lithobid and Klonopin, restart Ativan 0.5mg TID to treat anxiety symptoms. Will monitor over weekend to see if anxiety symptoms improve.    Plan:     Psychiatry:  Continue Zoloft 150mg--MDD  Ativan 0.5mg BID -- Anxiety  Propranolol 20mg TID--Anxiety    MEDICATIONS  (PRN):  albuterol    90 MICROgram(s) HFA Inhaler 2 Puff(s) Inhalation every 6 hours PRN asthma  diphenhydrAMINE Injectable 50 milliGRAM(s) IntraMuscular once PRN Agitation  haloperidol     Tablet 5 milliGRAM(s) Oral every 6 hours PRN Agitation  haloperidol    Injectable 5 milliGRAM(s) IntraMuscular once PRN Agitation  hydrOXYzine hydrochloride 50 milliGRAM(s) Oral every 6 hours PRN Anxiety  LORazepam     Tablet 2 milliGRAM(s) Oral every 6 hours PRN Agitation  LORazepam   Injectable 2 milliGRAM(s) IntraMuscular once PRN Agitation    Observation: routine, pt. is not an acute risk for suicide while inpatient; no indication for CO    Legal: 9.13 voluntary admission    Medical: Asthma--albuterol rescue inhaler  GERD--protonix 40mg daily  Routine medical followup; TSH, lipid panel and HA1C in the morning  Sx reduction, medication management, safety planning, milieu therapy.  Medical: consult with hospitalist regarding reported GERD and esophageal blockage. Chest Xray was unremarkable, however, pt. reports Hx of EGD and esophageal dilation.     Dispo: pending 32-year-old  nonbinary person unemployed living with spouse and her family, with diagnosis of MDD with history of one prior psychiatric hospitalization at [facility] from 7/21-7/29/2022 for suicidal thoughts, treated at [facility] since Aug 2022, currently on Lexapro, Ativan, and Propranolol, no history of self-injurious behavior or suicide attempts, no h/o violence or legal issues, PMH GERD and Asthma, no h/o substance abuse, referred by [facility] providers for SI and worsening depression/anxiety.     Pt reports panic attack yesterday, increased feelings of anxiety, hopelessness, and sadness. Pt also reports GI symptoms - nausea, anorexia, esophageal tightening, stomach ache/cramps, diarrhea. Will d/c Lithobid which may contribute to GI symptoms. Discontinue clonazepam, restart Ativan 0.5mg TID to treat anxiety symptoms. Will monitor over weekend to see if anxiety symptoms improve.    Plan:     Psychiatry:  Continue Zoloft 150mg--MDD  Ativan 0.5mg TID -- Anxiety  Propranolol 20mg TID--Anxiety    MEDICATIONS  (PRN):  albuterol    90 MICROgram(s) HFA Inhaler 2 Puff(s) Inhalation every 6 hours PRN asthma  diphenhydrAMINE Injectable 50 milliGRAM(s) IntraMuscular once PRN Agitation  haloperidol     Tablet 5 milliGRAM(s) Oral every 6 hours PRN Agitation  haloperidol    Injectable 5 milliGRAM(s) IntraMuscular once PRN Agitation  hydrOXYzine hydrochloride 50 milliGRAM(s) Oral every 6 hours PRN Anxiety  LORazepam     Tablet 2 milliGRAM(s) Oral every 6 hours PRN Agitation  LORazepam   Injectable 2 milliGRAM(s) IntraMuscular once PRN Agitation    Observation: routine, pt. is not an acute risk for suicide while inpatient; no indication for CO    Legal: 9.13 voluntary admission    Medical: Asthma--albuterol rescue inhaler  GERD--protonix 40mg daily  Routine medical followup; TSH, lipid panel and HA1C in the morning  Sx reduction, medication management, safety planning, milieu therapy.  Medical: consult with hospitalist regarding reported GERD and esophageal blockage. Chest Xray was unremarkable, however, pt. reports Hx of EGD and esophageal dilation.     Dispo: pending

## 2022-11-19 PROCEDURE — 99232 SBSQ HOSP IP/OBS MODERATE 35: CPT

## 2022-11-19 RX ADMIN — PANTOPRAZOLE SODIUM 40 MILLIGRAM(S): 20 TABLET, DELAYED RELEASE ORAL at 08:58

## 2022-11-19 RX ADMIN — Medication 0.25 MILLIGRAM(S): at 08:57

## 2022-11-19 RX ADMIN — ONDANSETRON 8 MILLIGRAM(S): 8 TABLET, FILM COATED ORAL at 09:03

## 2022-11-19 RX ADMIN — Medication 0.5 MILLIGRAM(S): at 20:08

## 2022-11-19 RX ADMIN — Medication 2 MILLIGRAM(S): at 10:33

## 2022-11-19 RX ADMIN — SERTRALINE 150 MILLIGRAM(S): 25 TABLET, FILM COATED ORAL at 08:57

## 2022-11-19 RX ADMIN — Medication 0.5 MILLIGRAM(S): at 12:58

## 2022-11-19 NOTE — BH INPATIENT PSYCHIATRY PROGRESS NOTE - CURRENT MEDICATION
MEDICATIONS  (STANDING):  influenza   Vaccine 0.5 milliLiter(s) IntraMuscular once  LORazepam     Tablet 0.5 milliGRAM(s) Oral three times a day  pantoprazole    Tablet 40 milliGRAM(s) Oral before breakfast  propranolol 20 milliGRAM(s) Oral three times a day  sertraline 150 milliGRAM(s) Oral daily    MEDICATIONS  (PRN):  albuterol    90 MICROgram(s) HFA Inhaler 2 Puff(s) Inhalation every 6 hours PRN asthma  diphenhydrAMINE Injectable 50 milliGRAM(s) IntraMuscular once PRN Agitation  haloperidol     Tablet 5 milliGRAM(s) Oral every 6 hours PRN Agitation  haloperidol    Injectable 5 milliGRAM(s) IntraMuscular once PRN Agitation  hydrOXYzine hydrochloride 50 milliGRAM(s) Oral every 6 hours PRN Anxiety  LORazepam     Tablet 2 milliGRAM(s) Oral every 6 hours PRN Agitation  LORazepam   Injectable 2 milliGRAM(s) IntraMuscular once PRN Agitation  ondansetron    Tablet 8 milliGRAM(s) Oral every 8 hours PRN nausea

## 2022-11-19 NOTE — BH INPATIENT PSYCHIATRY PROGRESS NOTE - NSBHASSESSSUMMFT_PSY_ALL_CORE
32-year-old  nonbinary person unemployed living with spouse and her family, with diagnosis of MDD with history of one prior psychiatric hospitalization at [facility] from 7/21-7/29/2022 for suicidal thoughts, treated at [facility] since Aug 2022, currently on Lexapro, Ativan, and Propranolol, no history of self-injurious behavior or suicide attempts, no h/o violence or legal issues, PMH GERD and Asthma, no h/o substance abuse, referred by [facility] providers for SI and worsening depression/anxiety.     Pt reports panic attack yesterday, increased feelings of anxiety, hopelessness, and sadness. Pt also reports GI symptoms - nausea, anorexia, esophageal tightening, stomach ache/cramps, diarrhea. Will d/c Lithobid and Klonopin, restart Ativan 0.5mg TID to treat anxiety symptoms. Will monitor over weekend to see if anxiety symptoms improve.    Plan:     Psychiatry:  Continue Zoloft 150mg--MDD  Ativan 0.5mg BID -- Anxiety  Propranolol 20mg TID--Anxiety    MEDICATIONS  (PRN):  albuterol    90 MICROgram(s) HFA Inhaler 2 Puff(s) Inhalation every 6 hours PRN asthma  diphenhydrAMINE Injectable 50 milliGRAM(s) IntraMuscular once PRN Agitation  haloperidol     Tablet 5 milliGRAM(s) Oral every 6 hours PRN Agitation  haloperidol    Injectable 5 milliGRAM(s) IntraMuscular once PRN Agitation  hydrOXYzine hydrochloride 50 milliGRAM(s) Oral every 6 hours PRN Anxiety  LORazepam     Tablet 2 milliGRAM(s) Oral every 6 hours PRN Agitation  LORazepam   Injectable 2 milliGRAM(s) IntraMuscular once PRN Agitation    Observation: routine, pt. is not an acute risk for suicide while inpatient; no indication for CO    Legal: 9.13 voluntary admission    Medical: Asthma--albuterol rescue inhaler  GERD--protonix 40mg daily  Routine medical followup; TSH, lipid panel and HA1C in the morning  Sx reduction, medication management, safety planning, milieu therapy.  Medical: consult with hospitalist regarding reported GERD and esophageal blockage. Chest Xray was unremarkable, however, pt. reports Hx of EGD and esophageal dilation.     Dispo: pending

## 2022-11-19 NOTE — BH INPATIENT PSYCHIATRY PROGRESS NOTE - NSBHFUPINTERVALHXFT_PSY_A_CORE
Patient is followed up for MDD, with somatic complaints.  Admitted for depression and passive suicide thoughts. Chart, medications and labs reviewed.  Patient is discussed with nursing staff.  Nursing reports patient c/o  panic attack today,   increased feelings of anxiety. Pt also reports continued GI symptoms - nausea, anorexia, esophageal tightening, stomach ache/cramps, diarrhea. Patient reports he spoke to MD yesterday who stated his medications were to be adjusted and patient concerned due to Lithium still being offered today.  Writer reviewed MD progress notes , per chart review "Will d/c Lithobid and Klonopin, restart Ativan 0.5mg TID to treat anxiety symptoms. Will monitor over weekend to see if anxiety symptoms improve."  Writer discontinued Lithobid and Klonopin, restarted Ativan as per MD note.  Will continue to monitor and provide therapeutic support.

## 2022-11-19 NOTE — BH INPATIENT PSYCHIATRY PROGRESS NOTE - NSBHCHARTREVIEWVS_PSY_A_CORE FT
Vital Signs Last 24 Hrs  T(C): 36.7 (11-19-22 @ 05:59), Max: 36.7 (11-19-22 @ 05:59)  T(F): 98 (11-19-22 @ 05:59), Max: 98 (11-19-22 @ 05:59)  HR: --  BP: --  BP(mean): --  RR: --  SpO2: --    Orthostatic VS  11-19-22 @ 08:29  Lying BP: --/-- HR: --  Sitting BP: 113/75 HR: 80  Standing BP: 122/66 HR: 84  Site: upper left arm  Mode: electronic  Orthostatic VS  11-18-22 @ 19:27  Lying BP: --/-- HR: --  Sitting BP: 122/89 HR: 72  Standing BP: 132/92 HR: 92  Site: --  Mode: --  Orthostatic VS  11-18-22 @ 08:01  Lying BP: --/-- HR: --  Sitting BP: 121/75 HR: 72  Standing BP: 139/84 HR: 89  Site: --  Mode: --  Orthostatic VS  11-17-22 @ 19:31  Lying BP: --/-- HR: --  Sitting BP: 118/78 HR: 82  Standing BP: 112/82 HR: 90  Site: --  Mode: --

## 2022-11-20 RX ADMIN — Medication 0.5 MILLIGRAM(S): at 20:53

## 2022-11-20 RX ADMIN — SERTRALINE 150 MILLIGRAM(S): 25 TABLET, FILM COATED ORAL at 08:24

## 2022-11-20 RX ADMIN — PANTOPRAZOLE SODIUM 40 MILLIGRAM(S): 20 TABLET, DELAYED RELEASE ORAL at 08:24

## 2022-11-20 RX ADMIN — Medication 0.5 MILLIGRAM(S): at 08:24

## 2022-11-20 RX ADMIN — Medication 0.5 MILLIGRAM(S): at 12:36

## 2022-11-21 RX ADMIN — Medication 0.5 MILLIGRAM(S): at 09:04

## 2022-11-21 RX ADMIN — Medication 0.5 MILLIGRAM(S): at 20:42

## 2022-11-21 RX ADMIN — Medication 0.5 MILLIGRAM(S): at 12:36

## 2022-11-21 RX ADMIN — PANTOPRAZOLE SODIUM 40 MILLIGRAM(S): 20 TABLET, DELAYED RELEASE ORAL at 09:06

## 2022-11-21 RX ADMIN — SERTRALINE 150 MILLIGRAM(S): 25 TABLET, FILM COATED ORAL at 09:04

## 2022-11-21 NOTE — BH INPATIENT PSYCHIATRY PROGRESS NOTE - NSBHCHARTREVIEWVS_PSY_A_CORE FT
Vital Signs Last 24 Hrs  T(C): 36.4 (11-21-22 @ 06:30), Max: 36.8 (11-20-22 @ 19:08)  T(F): 97.5 (11-21-22 @ 06:30), Max: 98.3 (11-20-22 @ 19:08)  HR: 69 (11-20-22 @ 12:27) (69 - 69)  BP: 109/65 (11-20-22 @ 12:27) (109/65 - 109/65)  BP(mean): --  RR: 17 (11-21-22 @ 08:30) (17 - 17)  SpO2: --    Orthostatic VS  11-21-22 @ 08:30  Lying BP: --/-- HR: --  Sitting BP: 119/59 HR: 72  Standing BP: 117/86 HR: 85  Site: --  Mode: --  Orthostatic VS  11-20-22 @ 19:08  Lying BP: --/-- HR: --  Sitting BP: 120/83 HR: 78  Standing BP: 128/80 HR: 73  Site: --  Mode: --  Orthostatic VS  11-20-22 @ 06:15  Lying BP: --/-- HR: --  Sitting BP: 111/77 HR: 69  Standing BP: 116/79 HR: 76  Site: --  Mode: --  Orthostatic VS  11-19-22 @ 19:19  Lying BP: --/-- HR: --  Sitting BP: 114/67 HR: 67  Standing BP: 105/68 HR: 69  Site: --  Mode: --   Vital Signs Last 24 Hrs  T(C): 36.6 (11-21-22 @ 14:37), Max: 36.8 (11-20-22 @ 19:08)  T(F): 97.8 (11-21-22 @ 14:37), Max: 98.3 (11-20-22 @ 19:08)  HR: 82 (11-21-22 @ 12:25) (82 - 82)  BP: 111/72 (11-21-22 @ 12:25) (111/72 - 111/72)  BP(mean): --  RR: 17 (11-21-22 @ 08:30) (17 - 17)  SpO2: --    Orthostatic VS  11-21-22 @ 08:30  Lying BP: --/-- HR: --  Sitting BP: 119/59 HR: 72  Standing BP: 117/86 HR: 85  Site: --  Mode: --  Orthostatic VS  11-20-22 @ 19:08  Lying BP: --/-- HR: --  Sitting BP: 120/83 HR: 78  Standing BP: 128/80 HR: 73  Site: --  Mode: --  Orthostatic VS  11-20-22 @ 06:15  Lying BP: --/-- HR: --  Sitting BP: 111/77 HR: 69  Standing BP: 116/79 HR: 76  Site: --  Mode: --  Orthostatic VS  11-19-22 @ 19:19  Lying BP: --/-- HR: --  Sitting BP: 114/67 HR: 67  Standing BP: 105/68 HR: 69  Site: --  Mode: --

## 2022-11-21 NOTE — BH INPATIENT PSYCHIATRY PROGRESS NOTE - NSBHMETABOLIC_PSY_ALL_CORE_FT
BMI: BMI (kg/m2): 39.5 (11-09-22 @ 13:46)  HbA1c: A1C with Estimated Average Glucose Result: 4.9 % (11-10-22 @ 08:00)    Glucose: POCT Blood Glucose.: 101 mg/dL (12-15-21 @ 00:29)    BP: 109/65 (11-20-22 @ 12:27) (109/65 - 118/69)  Lipid Panel: Date/Time: 11-10-22 @ 08:00  Cholesterol, Serum: 209  Direct LDL: --  HDL Cholesterol, Serum: 26  Total Cholesterol/HDL Ration Measurement: --  Triglycerides, Serum: 105   BMI: BMI (kg/m2): 39.5 (11-09-22 @ 13:46)  HbA1c: A1C with Estimated Average Glucose Result: 4.9 % (11-10-22 @ 08:00)    Glucose: POCT Blood Glucose.: 101 mg/dL (12-15-21 @ 00:29)    BP: 111/72 (11-21-22 @ 12:25) (109/65 - 118/69)  Lipid Panel: Date/Time: 11-10-22 @ 08:00  Cholesterol, Serum: 209  Direct LDL: --  HDL Cholesterol, Serum: 26  Total Cholesterol/HDL Ration Measurement: --  Triglycerides, Serum: 105

## 2022-11-21 NOTE — BH INPATIENT PSYCHIATRY PROGRESS NOTE - NSBHFUPINTERVALHXFT_PSY_A_CORE
Patient is followed up for MDD, with somatic complaints.  Admitted for depression and passive suicide thoughts. Chart, medications and labs reviewed.  Patient is discussed with nursing staff.  No significant interval events. Nursing reports patient remains compliant with all standing medications and tolerating well. Patient is tolerating holding titration of Zoloft. Thinks their stomach was "torn up" because of the lithium as they are still compaining of nausea and a burning sensation in the lower esophagus region. States that they are not sure if this feeling is due to medication or anxiety, but they have been coping well with the feeling and not letting it make them overly anxious. Received PRN atarax on Saturday during breakfast due to anxiety, but no other PRN. Overall, pt reports better mood and control of symptoms since Friday. They are still open to trying a mood stabilizer. Still reports some irritability as a side effect of Zoloft.  Denies SI/SIB/HI at this time, no plan or intent.  States they feel that being in the hospital has been beneficial and believes they are getting better each day, recognizes that changes in mood may be due to medication and that this mood is temporary. No overt psychotic trend.  Denies AVH, paranoia or delusions. Patient is followed up for MDD, with somatic complaints.  Admitted for depression and passive suicide thoughts. Chart, medications and labs reviewed.  Patient is discussed with nursing staff.  No significant interval events. Nursing reports patient remains compliant with all standing medications and tolerating well. Patient is tolerating holding titration of Zoloft. Thinks their stomach was "torn up" because of the lithium as they are still complaining of nausea and a burning sensation in the lower esophagus region. States that they are not sure if this feeling is due to medication or anxiety, but they have been coping well with the feeling and not letting it make them overly anxious. Received PRN atarax on Saturday during breakfast due to anxiety, but no other PRN. Overall, pt reports better mood and control of symptoms since Friday. They are still open to trying a mood stabilizer. Still reports some irritability as a side effect of Zoloft.  Denies SI/SIB/HI at this time, no plan or intent.  States they feel that being in the hospital has been beneficial and believes they are getting better each day, recognizes that changes in mood may be due to medication and that this mood is temporary. No overt psychotic trend.  Denies AVH, paranoia or delusions.

## 2022-11-21 NOTE — BH INPATIENT PSYCHIATRY PROGRESS NOTE - NSBHASSESSSUMMFT_PSY_ALL_CORE
32-year-old  nonbinary person unemployed living with spouse and her family, with diagnosis of MDD with history of one prior psychiatric hospitalization at [facility] from 7/21-7/29/2022 for suicidal thoughts, [facility] Aug 20[facility], currently on Lexapro, Ativan, and Propranolol, no history of self-injurious behavior or suicide attempts, no h/o violence or legal issues, PMH GERD and Asthma, no h/o substance abuse, referred by [facility] providers for SI and worsening depression/anxiety.     Pt reports better mood and anxiety symptoms. Will continue current medication regimen and monitor for any side effects.    Plan:     Psychiatry:  Continue Zoloft 150mg--MDD  Ativan 0.5mg BID -- Anxiety  Propranolol 20mg TID--Anxiety    MEDICATIONS  (PRN):  albuterol    90 MICROgram(s) HFA Inhaler 2 Puff(s) Inhalation every 6 hours PRN asthma  diphenhydrAMINE Injectable 50 milliGRAM(s) IntraMuscular once PRN Agitation  haloperidol     Tablet 5 milliGRAM(s) Oral every 6 hours PRN Agitation  haloperidol    Injectable 5 milliGRAM(s) IntraMuscular once PRN Agitation  hydrOXYzine hydrochloride 50 milliGRAM(s) Oral every 6 hours PRN Anxiety  LORazepam     Tablet 2 milliGRAM(s) Oral every 6 hours PRN Agitation  LORazepam   Injectable 2 milliGRAM(s) IntraMuscular once PRN Agitation    Observation: routine, pt. is not an acute risk for suicide while inpatient; no indication for CO    Legal: 9.13 voluntary admission    Medical: Asthma--albuterol rescue inhaler  GERD--protonix 40mg daily  Routine medical followup; TSH, lipid panel and HA1C in the morning  Sx reduction, medication management, safety planning, milieu therapy.  Medical: consult with hospitalist regarding reported GERD and esophageal blockage. Chest Xray was unremarkable, however, pt. reports Hx of EGD and esophageal dilation.     Dispo: pending

## 2022-11-22 LAB
ALBUMIN SERPL ELPH-MCNC: 4.4 G/DL — SIGNIFICANT CHANGE UP (ref 3.3–5)
ALP SERPL-CCNC: 89 U/L — SIGNIFICANT CHANGE UP (ref 40–120)
ALT FLD-CCNC: 43 U/L — HIGH (ref 4–41)
ANION GAP SERPL CALC-SCNC: 10 MMOL/L — SIGNIFICANT CHANGE UP (ref 7–14)
AST SERPL-CCNC: 24 U/L — SIGNIFICANT CHANGE UP (ref 4–40)
BASOPHILS # BLD AUTO: 0.04 K/UL — SIGNIFICANT CHANGE UP (ref 0–0.2)
BASOPHILS NFR BLD AUTO: 0.6 % — SIGNIFICANT CHANGE UP (ref 0–2)
BILIRUB SERPL-MCNC: 0.3 MG/DL — SIGNIFICANT CHANGE UP (ref 0.2–1.2)
BUN SERPL-MCNC: 16 MG/DL — SIGNIFICANT CHANGE UP (ref 7–23)
CALCIUM SERPL-MCNC: 9.2 MG/DL — SIGNIFICANT CHANGE UP (ref 8.4–10.5)
CHLORIDE SERPL-SCNC: 107 MMOL/L — SIGNIFICANT CHANGE UP (ref 98–107)
CO2 SERPL-SCNC: 26 MMOL/L — SIGNIFICANT CHANGE UP (ref 22–31)
CREAT SERPL-MCNC: 1.02 MG/DL — SIGNIFICANT CHANGE UP (ref 0.5–1.3)
EGFR: 101 ML/MIN/1.73M2 — SIGNIFICANT CHANGE UP
EOSINOPHIL # BLD AUTO: 0.15 K/UL — SIGNIFICANT CHANGE UP (ref 0–0.5)
EOSINOPHIL NFR BLD AUTO: 2.3 % — SIGNIFICANT CHANGE UP (ref 0–6)
GLUCOSE SERPL-MCNC: 133 MG/DL — HIGH (ref 70–99)
HCT VFR BLD CALC: 45.2 % — SIGNIFICANT CHANGE UP (ref 39–50)
HGB BLD-MCNC: 15.3 G/DL — SIGNIFICANT CHANGE UP (ref 13–17)
IANC: 4.37 K/UL — SIGNIFICANT CHANGE UP (ref 1.8–7.4)
IMM GRANULOCYTES NFR BLD AUTO: 0.3 % — SIGNIFICANT CHANGE UP (ref 0–0.9)
LYMPHOCYTES # BLD AUTO: 1.54 K/UL — SIGNIFICANT CHANGE UP (ref 1–3.3)
LYMPHOCYTES # BLD AUTO: 23.5 % — SIGNIFICANT CHANGE UP (ref 13–44)
MCHC RBC-ENTMCNC: 29.9 PG — SIGNIFICANT CHANGE UP (ref 27–34)
MCHC RBC-ENTMCNC: 33.8 GM/DL — SIGNIFICANT CHANGE UP (ref 32–36)
MCV RBC AUTO: 88.5 FL — SIGNIFICANT CHANGE UP (ref 80–100)
MONOCYTES # BLD AUTO: 0.43 K/UL — SIGNIFICANT CHANGE UP (ref 0–0.9)
MONOCYTES NFR BLD AUTO: 6.6 % — SIGNIFICANT CHANGE UP (ref 2–14)
NEUTROPHILS # BLD AUTO: 4.37 K/UL — SIGNIFICANT CHANGE UP (ref 1.8–7.4)
NEUTROPHILS NFR BLD AUTO: 66.7 % — SIGNIFICANT CHANGE UP (ref 43–77)
NRBC # BLD: 0 /100 WBCS — SIGNIFICANT CHANGE UP (ref 0–0)
NRBC # FLD: 0 K/UL — SIGNIFICANT CHANGE UP (ref 0–0)
PLATELET # BLD AUTO: 299 K/UL — SIGNIFICANT CHANGE UP (ref 150–400)
POTASSIUM SERPL-MCNC: 4.3 MMOL/L — SIGNIFICANT CHANGE UP (ref 3.5–5.3)
POTASSIUM SERPL-SCNC: 4.3 MMOL/L — SIGNIFICANT CHANGE UP (ref 3.5–5.3)
PROT SERPL-MCNC: 7.3 G/DL — SIGNIFICANT CHANGE UP (ref 6–8.3)
RBC # BLD: 5.11 M/UL — SIGNIFICANT CHANGE UP (ref 4.2–5.8)
RBC # FLD: 11.5 % — SIGNIFICANT CHANGE UP (ref 10.3–14.5)
SARS-COV-2 RNA SPEC QL NAA+PROBE: SIGNIFICANT CHANGE UP
SODIUM SERPL-SCNC: 143 MMOL/L — SIGNIFICANT CHANGE UP (ref 135–145)
WBC # BLD: 6.55 K/UL — SIGNIFICANT CHANGE UP (ref 3.8–10.5)
WBC # FLD AUTO: 6.55 K/UL — SIGNIFICANT CHANGE UP (ref 3.8–10.5)

## 2022-11-22 PROCEDURE — 99232 SBSQ HOSP IP/OBS MODERATE 35: CPT | Mod: 25

## 2022-11-22 PROCEDURE — 90853 GROUP PSYCHOTHERAPY: CPT

## 2022-11-22 PROCEDURE — 90833 PSYTX W PT W E/M 30 MIN: CPT | Mod: 59

## 2022-11-22 RX ORDER — SERTRALINE 25 MG/1
100 TABLET, FILM COATED ORAL DAILY
Refills: 0 | Status: DISCONTINUED | OUTPATIENT
Start: 2022-11-22 | End: 2022-11-29

## 2022-11-22 RX ORDER — ONDANSETRON 8 MG/1
4 TABLET, FILM COATED ORAL ONCE
Refills: 0 | Status: COMPLETED | OUTPATIENT
Start: 2022-11-22 | End: 2022-11-22

## 2022-11-22 RX ADMIN — PANTOPRAZOLE SODIUM 40 MILLIGRAM(S): 20 TABLET, DELAYED RELEASE ORAL at 08:01

## 2022-11-22 RX ADMIN — Medication 0.5 MILLIGRAM(S): at 12:32

## 2022-11-22 RX ADMIN — Medication 0.5 MILLIGRAM(S): at 20:45

## 2022-11-22 RX ADMIN — ONDANSETRON 4 MILLIGRAM(S): 8 TABLET, FILM COATED ORAL at 10:19

## 2022-11-22 RX ADMIN — Medication 0.5 MILLIGRAM(S): at 08:02

## 2022-11-22 RX ADMIN — Medication 1 MILLIGRAM(S): at 13:35

## 2022-11-22 RX ADMIN — SERTRALINE 150 MILLIGRAM(S): 25 TABLET, FILM COATED ORAL at 08:01

## 2022-11-22 NOTE — BH PSYCHOLOGY - CLINICIAN PSYCHOTHERAPY NOTE - NSBHPSYCHOLNARRATIVE_PSY_A_CORE FT
Writer and PT wore masks due to COVID19 precautions. Pt was adequately groomed, casually dressed. The Pt reported mood as ok, demonstrated mood congruent emotions. Pt's thought process was linear, speech was within normal limits. Pt denied suicidal ideation, plan or intent. Pt did not endorse homicidal ideation. Patient was oriented to person, place and time. No psychotic symptoms noted. PT demonstrated some ability to self reflect.    The pt was somatically focused and reported recent upset stomach. Pt is not sure if the symptoms are related to anxiety but reported that at home they often feel physical symptoms of anxiety. Pt is looking forward to discharge soon but also anxious about returning to a "chaotic house". Pt reported that their in-laws (who they live with) are difficult for them to cope with. Pt is hoping to move out with wife in near future. Pt discussed ways they can cope while home. Pt feels it is important to leave the house more often. Pt is also interested in volunteering in an animal shelter. Pt continued to discuss stress related to sharing their gender identity with more family members. Helped pt to process feelings related to this. Provided empathy and support.

## 2022-11-22 NOTE — BH INPATIENT PSYCHIATRY PROGRESS NOTE - NSCGISEVERILLNESS_PSY_ALL_CORE
5 = Markedly ill - intrusive symptoms that distinctly impair social/occupational function or cause intrusive levels of distress
7 = Among the most extremely ill patients – pathology drastically interferes in many life functions; may be hospitalized

## 2022-11-22 NOTE — BH INPATIENT PSYCHIATRY PROGRESS NOTE - NSBHMSEAFFQUAL_PSY_A_CORE
A refill request was received for:  Requested Prescriptions     Pending Prescriptions Disp Refills   • Metoprolol Succinate ER 50 MG Oral Tablet 24 Hr 90 tablet 1     Sig: Take 1 tablet (50 mg total) by mouth once daily.      Last refill date: 11/6/19  Qty: Anxious

## 2022-11-22 NOTE — BH INPATIENT PSYCHIATRY PROGRESS NOTE - NSBHCHARTREVIEWVS_PSY_A_CORE FT
Vital Signs Last 24 Hrs  T(C): 37.1 (11-22-22 @ 06:00), Max: 37.1 (11-22-22 @ 06:00)  T(F): 98.7 (11-22-22 @ 06:00), Max: 98.7 (11-22-22 @ 06:00)  HR: 82 (11-21-22 @ 12:25) (82 - 82)  BP: 111/72 (11-21-22 @ 12:25) (111/72 - 111/72)  BP(mean): --  RR: --  SpO2: --    Orthostatic VS  11-22-22 @ 08:04  Lying BP: --/-- HR: --  Sitting BP: 123/79 HR: 71  Standing BP: 113/75 HR: 76  Site: --  Mode: --  Orthostatic VS  11-21-22 @ 19:49  Lying BP: --/-- HR: --  Sitting BP: 116/76 HR: 73  Standing BP: 109/68 HR: 84  Site: --  Mode: --  Orthostatic VS  11-21-22 @ 08:30  Lying BP: --/-- HR: --  Sitting BP: 119/59 HR: 72  Standing BP: 117/86 HR: 85  Site: --  Mode: --  Orthostatic VS  11-20-22 @ 19:08  Lying BP: --/-- HR: --  Sitting BP: 120/83 HR: 78  Standing BP: 128/80 HR: 73  Site: --  Mode: --   Vital Signs Last 24 Hrs  T(C): 35.9 (11-22-22 @ 14:34), Max: 37.1 (11-22-22 @ 06:00)  T(F): 96.7 (11-22-22 @ 14:34), Max: 98.7 (11-22-22 @ 06:00)  HR: --  BP: --  BP(mean): --  RR: --  SpO2: --    Orthostatic VS  11-22-22 @ 14:51  Lying BP: --/-- HR: --  Sitting BP: 126/71 HR: 77  Standing BP: 122/73 HR: 85  Site: --  Mode: --  Orthostatic VS  11-22-22 @ 08:04  Lying BP: --/-- HR: --  Sitting BP: 123/79 HR: 71  Standing BP: 113/75 HR: 76  Site: --  Mode: --  Orthostatic VS  11-21-22 @ 19:49  Lying BP: --/-- HR: --  Sitting BP: 116/76 HR: 73  Standing BP: 109/68 HR: 84  Site: --  Mode: --  Orthostatic VS  11-21-22 @ 08:30  Lying BP: --/-- HR: --  Sitting BP: 119/59 HR: 72  Standing BP: 117/86 HR: 85  Site: --  Mode: --  Orthostatic VS  11-20-22 @ 19:08  Lying BP: --/-- HR: --  Sitting BP: 120/83 HR: 78  Standing BP: 128/80 HR: 73  Site: --  Mode: --

## 2022-11-22 NOTE — BH INPATIENT PSYCHIATRY PROGRESS NOTE - NSBHASSESSSUMMFT_PSY_ALL_CORE
32-year-old  nonbinary person unemployed living with spouse and her family, with diagnosis of MDD with history of one prior psychiatric hospitalization at [facility] from 7/21-7/29/2022 for suicidal thoughts, treated at [facility] since Aug 2022, currently on Lexapro, Ativan, and Propranolol, no history of self-injurious behavior or suicide attempts, no h/o violence or legal issues, PMH GERD and Asthma, no h/o substance abuse, referred by [facility] providers for SI and worsening depression/anxiety.     Pt reports worsening stomach pain, nausea, and diarrhea. Examination demonstrated dried lips and tongue, but no other signs of dehydration or other abnormalities. CBC, CMP, and COVID-19 swab ordered to rule out possible infectious cause of diarrhea. Prescribed Zofran PRN for nausea, encouraged use of Atarax as pt stated that it has helped calm symptoms in the past. Will follow up with lab results once attained. Discussed discharge plan with patient, planning for Friday discharge.    Plan:     Psychiatry:  Continue Zoloft 150mg--MDD  Ativan 0.5mg BID -- Anxiety  Propranolol 20mg TID--Anxiety    MEDICATIONS  (PRN):  albuterol    90 MICROgram(s) HFA Inhaler 2 Puff(s) Inhalation every 6 hours PRN asthma  diphenhydrAMINE Injectable 50 milliGRAM(s) IntraMuscular once PRN Agitation  haloperidol     Tablet 5 milliGRAM(s) Oral every 6 hours PRN Agitation  haloperidol    Injectable 5 milliGRAM(s) IntraMuscular once PRN Agitation  hydrOXYzine hydrochloride 50 milliGRAM(s) Oral every 6 hours PRN Anxiety  LORazepam     Tablet 2 milliGRAM(s) Oral every 6 hours PRN Agitation  LORazepam   Injectable 2 milliGRAM(s) IntraMuscular once PRN Agitation    Observation: routine, pt. is not an acute risk for suicide while inpatient; no indication for CO    Legal: 9.13 voluntary admission    Medical: Asthma--albuterol rescue inhaler  GERD--protonix 40mg daily  Routine medical followup; TSH, lipid panel and HA1C in the morning  Sx reduction, medication management, safety planning, milieu therapy.  Medical: consult with hospitalist regarding reported GERD and esophageal blockage. Chest Xray was unremarkable, however, pt. reports Hx of EGD and esophageal dilation.     Dispo: pending 32-year-old  nonbinary person unemployed living with spouse and her family, with diagnosis of MDD with history of one prior psychiatric hospitalization at [facility] from 7/21-7/29/2022 for suicidal thoughts, [facility] since Aug 2022, currently on Lexapro, Ativan, and Propranolol, no history of self-injurious behavior or suicide attempts, no h/o violence or legal issues, PMH GERD and Asthma, no h/o substance abuse, referred by [facility] providers for SI and worsening depression/anxiety.     Pt reports worsening stomach pain, nausea, and diarrhea. Examination demonstrated dried lips and tongue, but no other signs of dehydration or other abnormalities. CBC, CMP, and COVID-19 swab ordered to rule out possible infectious cause of diarrhea: COVID-19 PCR is negative, CBC and CMP are WNL. Prescribed Zofran PRN for nausea w/ temporarily relief. Pepto Bismol ordered and additional Ativan 1mg once for anxiety/nausea w/ relief.   Pt. being monitored for Sx of serotonin syndrome; although unlikely, will taper Zoloft and assess for relief of Sx. Hospitalist contacted and are following.     Plan:     Psychiatry:  Continue Zoloft 100mg--MDD  Ativan 0.5mg TID -- Anxiety  Propranolol 20mg TID--Anxiety    MEDICATIONS  (PRN):  albuterol    90 MICROgram(s) HFA Inhaler 2 Puff(s) Inhalation every 6 hours PRN asthma  diphenhydrAMINE Injectable 50 milliGRAM(s) IntraMuscular once PRN Agitation  haloperidol     Tablet 5 milliGRAM(s) Oral every 6 hours PRN Agitation  haloperidol    Injectable 5 milliGRAM(s) IntraMuscular once PRN Agitation  hydrOXYzine hydrochloride 50 milliGRAM(s) Oral every 6 hours PRN Anxiety  LORazepam     Tablet 2 milliGRAM(s) Oral every 6 hours PRN Agitation  LORazepam   Injectable 2 milliGRAM(s) IntraMuscular once PRN Agitation    Observation: routine, pt. is not an acute risk for suicide while inpatient; no indication for CO    Legal: 9.13 voluntary admission    Medical: Asthma--albuterol rescue inhaler  GERD--protonix 40mg daily  Routine medical followup; TSH, lipid panel and HA1C in the morning  Sx reduction, medication management, safety planning, milieu therapy.  Medical: consult with hospitalist regarding reported GERD and esophageal blockage. Chest Xray was unremarkable, however, pt. reports Hx of EGD and esophageal dilation.     Dispo: pending

## 2022-11-22 NOTE — BH INPATIENT PSYCHIATRY PROGRESS NOTE - NSBHFUPINTERVALHXFT_PSY_A_CORE
Patient is followed up for MDD, with somatic complaints.  Admitted for depression and passive suicide thoughts. Chart, medications and labs reviewed.  Patient is discussed with nursing staff.  No significant interval events. Nursing reports patient remains compliant with all standing medications and tolerating well. Patient is tolerating holding titration of Zoloft.   Patient complains of increased stomach pain/burning, nausea, and diarrhea. Had several episodes of diarrhea yesterday, multiple within 30 minutes of one another. The stomach symptoms are worst in the morning, pt feels they have to force themselves to eat, and symptoms gradually improve throughout the day. Pt recognizes relationship between anxiety and stomach symptoms, but would like to investigate other causes of stomach illness. Noted pt to have dry lips and tongue, no other signs of dehydration, and pt reports they have been hydrating throughout the day appropriately.  Conversation today was focused on discharge. They do feel like their symptoms have improved, but are unsure if they are gaining much benefit from still being inpatient. They acknowledge that a lot of their anxiety surrounds being discharged, but was able to name several resources and ways to get help if needed. Discussed Friday as a possible discharge date, and they were happy with that.  Denies SI/SIB/HI at this time, no plan or intent.  States they feel that being in the hospital has been beneficial and believes they are getting better each day, recognizes that changes in mood may be due to medication and that this mood is temporary. No overt psychotic trend.  Denies AVH, paranoia or delusions.

## 2022-11-22 NOTE — BH INPATIENT PSYCHIATRY PROGRESS NOTE - NSBHMETABOLIC_PSY_ALL_CORE_FT
BMI: BMI (kg/m2): 39.5 (11-09-22 @ 13:46)  HbA1c: A1C with Estimated Average Glucose Result: 4.9 % (11-10-22 @ 08:00)    Glucose: POCT Blood Glucose.: 101 mg/dL (12-15-21 @ 00:29)    BP: 111/72 (11-21-22 @ 12:25) (109/65 - 118/69)  Lipid Panel: Date/Time: 11-10-22 @ 08:00  Cholesterol, Serum: 209  Direct LDL: --  HDL Cholesterol, Serum: 26  Total Cholesterol/HDL Ration Measurement: --  Triglycerides, Serum: 105   BMI: BMI (kg/m2): 39.5 (11-09-22 @ 13:46)  HbA1c: A1C with Estimated Average Glucose Result: 4.9 % (11-10-22 @ 08:00)    Glucose: POCT Blood Glucose.: 101 mg/dL (12-15-21 @ 00:29)    BP: 111/72 (11-21-22 @ 12:25) (109/65 - 111/72)  Lipid Panel: Date/Time: 11-10-22 @ 08:00  Cholesterol, Serum: 209  Direct LDL: --  HDL Cholesterol, Serum: 26  Total Cholesterol/HDL Ration Measurement: --  Triglycerides, Serum: 105

## 2022-11-22 NOTE — BH INPATIENT PSYCHIATRY PROGRESS NOTE - PRN MEDS
MEDICATIONS  (PRN):  albuterol    90 MICROgram(s) HFA Inhaler 2 Puff(s) Inhalation every 6 hours PRN asthma  diphenhydrAMINE Injectable 50 milliGRAM(s) IntraMuscular once PRN Agitation  haloperidol     Tablet 5 milliGRAM(s) Oral every 6 hours PRN Agitation  haloperidol    Injectable 5 milliGRAM(s) IntraMuscular once PRN Agitation  hydrOXYzine hydrochloride 50 milliGRAM(s) Oral every 6 hours PRN Anxiety  LORazepam     Tablet 2 milliGRAM(s) Oral every 6 hours PRN Agitation  LORazepam   Injectable 2 milliGRAM(s) IntraMuscular once PRN Agitation  ondansetron    Tablet 8 milliGRAM(s) Oral every 8 hours PRN nausea   MEDICATIONS  (PRN):  albuterol    90 MICROgram(s) HFA Inhaler 2 Puff(s) Inhalation every 6 hours PRN asthma  bismuth subsalicylate Liquid 30 milliLiter(s) Oral every 4 hours PRN N/V  diphenhydrAMINE Injectable 50 milliGRAM(s) IntraMuscular once PRN Agitation  haloperidol     Tablet 5 milliGRAM(s) Oral every 6 hours PRN Agitation  haloperidol    Injectable 5 milliGRAM(s) IntraMuscular once PRN Agitation  hydrOXYzine hydrochloride 50 milliGRAM(s) Oral every 6 hours PRN Anxiety  LORazepam     Tablet 2 milliGRAM(s) Oral every 6 hours PRN Agitation  LORazepam   Injectable 2 milliGRAM(s) IntraMuscular once PRN Agitation

## 2022-11-22 NOTE — BH INPATIENT PSYCHIATRY PROGRESS NOTE - CURRENT MEDICATION
MEDICATIONS  (STANDING):  influenza   Vaccine 0.5 milliLiter(s) IntraMuscular once  LORazepam     Tablet 0.5 milliGRAM(s) Oral three times a day  pantoprazole    Tablet 40 milliGRAM(s) Oral before breakfast  propranolol 20 milliGRAM(s) Oral three times a day  sertraline 150 milliGRAM(s) Oral daily    MEDICATIONS  (PRN):  albuterol    90 MICROgram(s) HFA Inhaler 2 Puff(s) Inhalation every 6 hours PRN asthma  diphenhydrAMINE Injectable 50 milliGRAM(s) IntraMuscular once PRN Agitation  haloperidol     Tablet 5 milliGRAM(s) Oral every 6 hours PRN Agitation  haloperidol    Injectable 5 milliGRAM(s) IntraMuscular once PRN Agitation  hydrOXYzine hydrochloride 50 milliGRAM(s) Oral every 6 hours PRN Anxiety  LORazepam     Tablet 2 milliGRAM(s) Oral every 6 hours PRN Agitation  LORazepam   Injectable 2 milliGRAM(s) IntraMuscular once PRN Agitation  ondansetron    Tablet 8 milliGRAM(s) Oral every 8 hours PRN nausea   MEDICATIONS  (STANDING):  influenza   Vaccine 0.5 milliLiter(s) IntraMuscular once  LORazepam     Tablet 0.5 milliGRAM(s) Oral <User Schedule>  LORazepam     Tablet 1 milliGRAM(s) Oral daily  pantoprazole    Tablet 40 milliGRAM(s) Oral before breakfast  propranolol 20 milliGRAM(s) Oral three times a day  sertraline 100 milliGRAM(s) Oral daily    MEDICATIONS  (PRN):  albuterol    90 MICROgram(s) HFA Inhaler 2 Puff(s) Inhalation every 6 hours PRN asthma  bismuth subsalicylate Liquid 30 milliLiter(s) Oral every 4 hours PRN N/V  diphenhydrAMINE Injectable 50 milliGRAM(s) IntraMuscular once PRN Agitation  haloperidol     Tablet 5 milliGRAM(s) Oral every 6 hours PRN Agitation  haloperidol    Injectable 5 milliGRAM(s) IntraMuscular once PRN Agitation  hydrOXYzine hydrochloride 50 milliGRAM(s) Oral every 6 hours PRN Anxiety  LORazepam     Tablet 2 milliGRAM(s) Oral every 6 hours PRN Agitation  LORazepam   Injectable 2 milliGRAM(s) IntraMuscular once PRN Agitation

## 2022-11-23 RX ORDER — ONDANSETRON 8 MG/1
4 TABLET, FILM COATED ORAL EVERY 8 HOURS
Refills: 0 | Status: DISCONTINUED | OUTPATIENT
Start: 2022-11-23 | End: 2022-11-29

## 2022-11-23 RX ORDER — HYDROXYZINE HCL 10 MG
50 TABLET ORAL AT BEDTIME
Refills: 0 | Status: DISCONTINUED | OUTPATIENT
Start: 2022-11-23 | End: 2022-11-29

## 2022-11-23 RX ADMIN — Medication 1 MILLIGRAM(S): at 09:36

## 2022-11-23 RX ADMIN — Medication 50 MILLIGRAM(S): at 20:18

## 2022-11-23 RX ADMIN — Medication 0.5 MILLIGRAM(S): at 12:08

## 2022-11-23 RX ADMIN — SERTRALINE 100 MILLIGRAM(S): 25 TABLET, FILM COATED ORAL at 09:36

## 2022-11-23 RX ADMIN — Medication 0.5 MILLIGRAM(S): at 21:31

## 2022-11-23 RX ADMIN — PANTOPRAZOLE SODIUM 40 MILLIGRAM(S): 20 TABLET, DELAYED RELEASE ORAL at 07:53

## 2022-11-23 NOTE — BH INPATIENT PSYCHIATRY PROGRESS NOTE - CURRENT MEDICATION
MEDICATIONS  (STANDING):  influenza   Vaccine 0.5 milliLiter(s) IntraMuscular once  LORazepam     Tablet 0.5 milliGRAM(s) Oral <User Schedule>  LORazepam     Tablet 1 milliGRAM(s) Oral daily  pantoprazole    Tablet 40 milliGRAM(s) Oral before breakfast  propranolol 20 milliGRAM(s) Oral three times a day  sertraline 100 milliGRAM(s) Oral daily    MEDICATIONS  (PRN):  albuterol    90 MICROgram(s) HFA Inhaler 2 Puff(s) Inhalation every 6 hours PRN asthma  bismuth subsalicylate Liquid 30 milliLiter(s) Oral every 4 hours PRN N/V  diphenhydrAMINE Injectable 50 milliGRAM(s) IntraMuscular once PRN Agitation  haloperidol     Tablet 5 milliGRAM(s) Oral every 6 hours PRN Agitation  haloperidol    Injectable 5 milliGRAM(s) IntraMuscular once PRN Agitation  hydrOXYzine hydrochloride 50 milliGRAM(s) Oral every 6 hours PRN Anxiety  LORazepam     Tablet 2 milliGRAM(s) Oral every 6 hours PRN Agitation  LORazepam   Injectable 2 milliGRAM(s) IntraMuscular once PRN Agitation

## 2022-11-23 NOTE — BH TREATMENT PLAN - NSTXPATIENTPARTICIPATE_PSY_ALL_CORE
Patient participated in identification of needs/problems/goals for treatment/Patient participated in defining interventions/Patient participated in development of after care plan

## 2022-11-23 NOTE — BH TREATMENT PLAN - NSTXDEPRESINTERPR_PSY_ALL_CORE
Pt made some progress. Pt has identified his coping skills such as talk to someone to manage symptoms. Writer will encourage pt to explore other coping skills to manage symptoms.
Psych rehab staff will continue to assist patient in psych rehab goals pertaining to identifying healthy and effective coping skills as well as attending daily psych rehab groups for improved symptom management within seven days.
Pt made some progress. Pt has identified his coping skills such as talk to someone to manage symptoms. Writer will encourage pt to explore other coping skills to manage symptoms.

## 2022-11-23 NOTE — BH TREATMENT PLAN - NSCMSPTSTRENGTHS_PSY_ALL_CORE
Able to adapt/Expressive of emotions/Motivated/Physically healthy/Supportive family

## 2022-11-23 NOTE — BH TREATMENT PLAN - NSDCCRITERIA_PSY_ALL_CORE
Sx reduction/remission, decreased risk of harm

## 2022-11-23 NOTE — BH INPATIENT PSYCHIATRY PROGRESS NOTE - NSBHFUPINTERVALHXFT_PSY_A_CORE
Patient is followed up for MDD, with somatic complaints.  Admitted for depression and passive suicide thoughts. Chart, medications and labs reviewed.  Patient is discussed with nursing staff.  No significant interval events. Nursing reports patient remains compliant with all standing medications and tolerating well. Patient is tolerating holding titration of Zoloft.   Patient complains of increased stomach pain/burning, nausea, and diarrhea, stating that their stomach complaints are at an all time high. Had several episodes of diarrhea yesterday and today, one episode of vomiting yesterday and two episodes in the morning today. The stomach symptoms are causing increased anxiety causing the patient "not to feel safe." During discussion with the patient, they experienced a panic attack where they were able to appropriately self soothe.  Discussion was had concerning when the symptoms began, and we identified that it started around the time we discussed changes to the medication regimen, which caused increased anxiety.   Concerning discharge, they are no longer comfortable with a Friday discharge. Discussed possibility of Tuesday, which they were more comfortable with "as long as you all think I'm okay."  Denies SI/SIB/HI at this time, no plan or intent. No overt psychotic trend.  Denies AVH, paranoia or delusions.

## 2022-11-23 NOTE — BH INPATIENT PSYCHIATRY PROGRESS NOTE - NSBHASSESSSUMMFT_PSY_ALL_CORE
32-year-old  nonbinary person unemployed living with spouse and her family, with diagnosis of MDD with history of one prior psychiatric hospitalization at [facility] from 7/21-7/29/2022 for suicidal thoughts, treated at [facility] since Aug 2022, currently on Lexapro, Ativan, and Propranolol, no history of self-injurious behavior or suicide attempts, no h/o violence or legal issues, PMH GERD and Asthma, no h/o substance abuse, referred by [facility] providers for SI and worsening depression/anxiety.     Pt reports worsening stomach pain, vomiting, nausea, and diarrhea.  Encouraged pt to continue taking PRN Zofran and Pepto-Bismol. After discussion with pt, agreed to add daily atarax as qhs rather than having it PRN as that has provided symptom alleviation in the past. Will continue to monitor GI and anxiety symptoms.  Pt. being monitored for Sx of serotonin syndrome; although unlikely, will taper Zoloft and assess for relief of Sx. Hospitalist contacted and are following.     Plan:     Psychiatry:  Continue Zoloft 100mg--MDD  Ativan 0.5mg TID -- Anxiety  Propranolol 20mg TID--Anxiety  Hydroxyzine hydrochloride 50mg qhs -- Anxiety & somatic complaints    MEDICATIONS  (PRN):  albuterol    90 MICROgram(s) HFA Inhaler 2 Puff(s) Inhalation every 6 hours PRN asthma  diphenhydrAMINE Injectable 50 milliGRAM(s) IntraMuscular once PRN Agitation  haloperidol     Tablet 5 milliGRAM(s) Oral every 6 hours PRN Agitation  haloperidol    Injectable 5 milliGRAM(s) IntraMuscular once PRN Agitation  hydrOXYzine hydrochloride 50 milliGRAM(s) Oral every 6 hours PRN Anxiety  LORazepam     Tablet 2 milliGRAM(s) Oral every 6 hours PRN Agitation  LORazepam   Injectable 2 milliGRAM(s) IntraMuscular once PRN Agitation    Observation: routine, pt. is not an acute risk for suicide while inpatient; no indication for CO    Legal: 9.13 voluntary admission    Medical: Asthma--albuterol rescue inhaler  GERD--protonix 40mg daily  Routine medical followup; TSH, lipid panel and HA1C in the morning  Sx reduction, medication management, safety planning, milieu therapy.  Medical: consult with hospitalist regarding reported GERD and esophageal blockage. Chest Xray was unremarkable, however, pt. reports Hx of EGD and esophageal dilation.     Dispo: pending 32-year-old  nonbinary person unemployed living with spouse and her family, with diagnosis of MDD with history of one prior psychiatric hospitalization at [facility] from 7/21-7/29/2022 for suicidal thoughts, treated at [facility] since Aug 2022, currently on Lexapro, Ativan, and Propranolol, no history of self-injurious behavior or suicide attempts, no h/o violence or legal issues, PMH GERD and Asthma, no h/o substance abuse, referred by [facility] providers for SI and worsening depression/anxiety.     Pt reports worsening stomach pain, vomiting, nausea, and diarrhea.  Encouraged pt to continue taking PRN Zofran and Pepto-Bismol. After discussion with pt, agreed to add daily atarax at hs rather than having it PRN as that has provided symptom alleviation in the past. Will continue to monitor GI and anxiety symptoms.  Pt. being monitored for Sx of serotonin syndrome; although unlikely, continue Zoloft at 100mg and assess for relief of Sx. Hospitalist contacted and are following.     Plan:     Psychiatry:  Continue Zoloft 100mg--MDD  Ativan 0.5mg TID -- Anxiety  Propranolol 20mg TID--Anxiety  Hydroxyzine hydrochloride 50mg qhs -- Anxiety & somatic complaints    MEDICATIONS  (PRN):  albuterol    90 MICROgram(s) HFA Inhaler 2 Puff(s) Inhalation every 6 hours PRN asthma  diphenhydrAMINE Injectable 50 milliGRAM(s) IntraMuscular once PRN Agitation  haloperidol     Tablet 5 milliGRAM(s) Oral every 6 hours PRN Agitation  haloperidol    Injectable 5 milliGRAM(s) IntraMuscular once PRN Agitation  hydrOXYzine hydrochloride 50 milliGRAM(s) Oral every 6 hours PRN Anxiety  LORazepam     Tablet 2 milliGRAM(s) Oral every 6 hours PRN Agitation  LORazepam   Injectable 2 milliGRAM(s) IntraMuscular once PRN Agitation    Observation: routine, pt. is not an acute risk for suicide while inpatient; no indication for CO    Legal: 9.13 voluntary admission    Medical: Asthma--albuterol rescue inhaler  GERD--protonix 40mg daily  Routine medical followup; TSH, lipid panel and HA1C in the morning  Sx reduction, medication management, safety planning, milieu therapy.  Medical: consult with hospitalist regarding reported GERD and esophageal blockage. Chest Xray was unremarkable, however, pt. reports Hx of EGD and esophageal dilation.     Dispo: pending

## 2022-11-23 NOTE — BH TREATMENT PLAN - NSBHPRIMARYDX_PSY_ALL_CORE
Major depressive disorder, single episode with anxious distress    
Severe episode of recurrent major depressive disorder, without psychotic features    
Severe episode of recurrent major depressive disorder, without psychotic features

## 2022-11-23 NOTE — BH INPATIENT PSYCHIATRY PROGRESS NOTE - NSBHMETABOLIC_PSY_ALL_CORE_FT
BMI: BMI (kg/m2): 39.5 (11-09-22 @ 13:46)  HbA1c: A1C with Estimated Average Glucose Result: 4.9 % (11-10-22 @ 08:00)    Glucose: POCT Blood Glucose.: 101 mg/dL (12-15-21 @ 00:29)    BP: 111/72 (11-21-22 @ 12:25) (111/72 - 111/72)  Lipid Panel: Date/Time: 11-10-22 @ 08:00  Cholesterol, Serum: 209  Direct LDL: --  HDL Cholesterol, Serum: 26  Total Cholesterol/HDL Ration Measurement: --  Triglycerides, Serum: 105

## 2022-11-23 NOTE — BH TREATMENT PLAN - NSTXCAREGIVERPARTICIPATE_PSY_P_CORE
Family/Caregiver participated in identification of needs/problems/goals for treatment/Family/Caregiver participated in defining interventions/Family/Caregiver participated in development of after care plan

## 2022-11-23 NOTE — BH TREATMENT PLAN - NSTXDEPRESGOAL_PSY_ALL_CORE
Report that he/she had enough energy and motivation to meet with a visitor daily
Will identify 2 coping skills that assist in improving mood
Will identify 2 coping skills that assist in improving mood

## 2022-11-23 NOTE — BH TREATMENT PLAN - NSTXSUICIDINTERRN_PSY_ALL_CORE
Assess pt for suicidal ideation, intent, plan, and self-injurious behavior, provide support, maintain safety, explore healthy coping skills and protective factors, identify triggers, encourage pt to participate in groups and unit activities, administer and educate on ordered medications

## 2022-11-23 NOTE — BH TREATMENT PLAN - NSTXPLANTHERAPYSESSIONSFT_PSY_ALL_CORE
11-17-22  Type of therapy: Coping skills,Medication management,Self esteem  Type of session: Individual  Level of patient participation: Attentive,Engaged  Duration of participation: 30 minutes  Therapy conducted by: Psych rehab  Therapy Summary: Pt reported slightly less depressed as symptoms improvement. Pt discussed his feeling towards his gender identity issue and other people’s reaction around him. Pt stated his wife is very supportive, but his father-in -law is not supportive and made some negative comments about it. Pt admitted he used to hide his feelings in the past, but he feels towards his 30’s he cannot live like it anymore. Writer provided support and discussed possible explore to LGBTQ community for support. Pt was superficially receptive. Pt stated he needs to get himself ready before he reaches out to others. Writer provided support.  Pt currently denies SI, HI, AH, and VH.     Psychiatric rehabilitation staff has been providing group with requirement of social distancing and wearing mask. Over this past week, pt showed less than 5% of group attendance. During the group session, pt was able to tolerate group structure, quiet, participated very minimally. Pt become more visible towards the end of this week, however, pt remains to be isolative at times. Pt showed good interactions with selective peers. Pt maintained fair ADLs.  
  11-17-22  Type of therapy: Coping skills,Medication management,Self esteem  Type of session: Individual  Level of patient participation: Attentive,Engaged  Duration of participation: 30 minutes  Therapy conducted by: Psych rehab  Therapy Summary: Pt reported slightly less depressed as symptoms improvement. Pt discussed his feeling towards his gender identity issue and other people’s reaction around him. Pt stated his wife is very supportive, but his father-in -law is not supportive and made some negative comments about it. Pt admitted he used to hide his feelings in the past, but he feels towards his 30’s he cannot live like it anymore. Writer provided support and discussed possible explore to LGBTQ community for support. Pt was superficially receptive. Pt stated he needs to get himself ready before he reaches out to others. Writer provided support.  Pt currently denies SI, HI, AH, and VH.     Psychiatric rehabilitation staff has been providing group with requirement of social distancing and wearing mask. Over this past week, pt showed less than 5% of group attendance. During the group session, pt was able to tolerate group structure, quiet, participated very minimally. Pt become more visible towards the end of this week, however, pt remains to be isolative at times. Pt showed good interactions with selective peers. Pt maintained fair ADLs.

## 2022-11-23 NOTE — BH INPATIENT PSYCHIATRY PROGRESS NOTE - PRN MEDS
MEDICATIONS  (PRN):  albuterol    90 MICROgram(s) HFA Inhaler 2 Puff(s) Inhalation every 6 hours PRN asthma  bismuth subsalicylate Liquid 30 milliLiter(s) Oral every 4 hours PRN N/V  diphenhydrAMINE Injectable 50 milliGRAM(s) IntraMuscular once PRN Agitation  haloperidol     Tablet 5 milliGRAM(s) Oral every 6 hours PRN Agitation  haloperidol    Injectable 5 milliGRAM(s) IntraMuscular once PRN Agitation  hydrOXYzine hydrochloride 50 milliGRAM(s) Oral every 6 hours PRN Anxiety  LORazepam     Tablet 2 milliGRAM(s) Oral every 6 hours PRN Agitation  LORazepam   Injectable 2 milliGRAM(s) IntraMuscular once PRN Agitation

## 2022-11-23 NOTE — BH INPATIENT PSYCHIATRY PROGRESS NOTE - NSBHCHARTREVIEWVS_PSY_A_CORE FT
Vital Signs Last 24 Hrs  T(C): 35.8 (11-23-22 @ 06:23), Max: 37.1 (11-22-22 @ 19:21)  T(F): 96.5 (11-23-22 @ 06:23), Max: 98.7 (11-22-22 @ 19:21)  HR: --  BP: --  BP(mean): --  RR: 18 (11-23-22 @ 07:02) (18 - 18)  SpO2: --    Orthostatic VS  11-23-22 @ 07:02  Lying BP: --/-- HR: --  Sitting BP: 121/72 HR: 74  Standing BP: 113/71 HR: 84  Site: upper left arm  Mode: electronic  Orthostatic VS  11-22-22 @ 19:21  Lying BP: --/-- HR: --  Sitting BP: 133/74 HR: 67  Standing BP: 124/75 HR: 69  Site: --  Mode: --  Orthostatic VS  11-22-22 @ 14:51  Lying BP: --/-- HR: --  Sitting BP: 126/71 HR: 77  Standing BP: 122/73 HR: 85  Site: --  Mode: --  Orthostatic VS  11-22-22 @ 08:04  Lying BP: --/-- HR: --  Sitting BP: 123/79 HR: 71  Standing BP: 113/75 HR: 76  Site: --  Mode: --  Orthostatic VS  11-21-22 @ 19:49  Lying BP: --/-- HR: --  Sitting BP: 116/76 HR: 73  Standing BP: 109/68 HR: 84  Site: --  Mode: --   Vital Signs Last 24 Hrs  T(C): 36.9 (11-23-22 @ 14:14), Max: 37.1 (11-22-22 @ 19:21)  T(F): 98.4 (11-23-22 @ 14:14), Max: 98.7 (11-22-22 @ 19:21)  HR: --  BP: --  BP(mean): --  RR: 18 (11-23-22 @ 07:02) (18 - 18)  SpO2: --    Orthostatic VS  11-23-22 @ 07:02  Lying BP: --/-- HR: --  Sitting BP: 121/72 HR: 74  Standing BP: 113/71 HR: 84  Site: upper left arm  Mode: electronic  Orthostatic VS  11-22-22 @ 19:21  Lying BP: --/-- HR: --  Sitting BP: 133/74 HR: 67  Standing BP: 124/75 HR: 69  Site: --  Mode: --  Orthostatic VS  11-22-22 @ 14:51  Lying BP: --/-- HR: --  Sitting BP: 126/71 HR: 77  Standing BP: 122/73 HR: 85  Site: --  Mode: --  Orthostatic VS  11-22-22 @ 08:04  Lying BP: --/-- HR: --  Sitting BP: 123/79 HR: 71  Standing BP: 113/75 HR: 76  Site: --  Mode: --  Orthostatic VS  11-21-22 @ 19:49  Lying BP: --/-- HR: --  Sitting BP: 116/76 HR: 73  Standing BP: 109/68 HR: 84  Site: --  Mode: --

## 2022-11-23 NOTE — BH TREATMENT PLAN - NSTXDCOTHRINTERSW_PSY_ALL_CORE
SW will provide support, insight, discharge planning, psychoeducation, and will maintain contact with identified supports.

## 2022-11-24 RX ADMIN — Medication 1 MILLIGRAM(S): at 09:02

## 2022-11-24 RX ADMIN — PANTOPRAZOLE SODIUM 40 MILLIGRAM(S): 20 TABLET, DELAYED RELEASE ORAL at 09:02

## 2022-11-24 RX ADMIN — Medication 50 MILLIGRAM(S): at 20:42

## 2022-11-24 RX ADMIN — Medication 0.5 MILLIGRAM(S): at 20:42

## 2022-11-24 RX ADMIN — SERTRALINE 100 MILLIGRAM(S): 25 TABLET, FILM COATED ORAL at 09:02

## 2022-11-24 RX ADMIN — Medication 0.5 MILLIGRAM(S): at 13:35

## 2022-11-25 PROCEDURE — 99232 SBSQ HOSP IP/OBS MODERATE 35: CPT

## 2022-11-25 RX ADMIN — SERTRALINE 100 MILLIGRAM(S): 25 TABLET, FILM COATED ORAL at 08:24

## 2022-11-25 RX ADMIN — Medication 50 MILLIGRAM(S): at 21:02

## 2022-11-25 RX ADMIN — Medication 0.5 MILLIGRAM(S): at 13:01

## 2022-11-25 RX ADMIN — Medication 0.5 MILLIGRAM(S): at 21:02

## 2022-11-25 RX ADMIN — Medication 1 MILLIGRAM(S): at 08:24

## 2022-11-25 RX ADMIN — PANTOPRAZOLE SODIUM 40 MILLIGRAM(S): 20 TABLET, DELAYED RELEASE ORAL at 08:24

## 2022-11-25 NOTE — BH INPATIENT PSYCHIATRY PROGRESS NOTE - NSBHFUPINTERVALHXFT_PSY_A_CORE
Patient is followed up for MDD, with somatic complaints.  Admitted for depression and passive suicide thoughts. Chart, medications and labs reviewed.  Patient is discussed with nursing staff.  No significant interval events. Nursing reports patient remains compliant with all standing medications and tolerating well.   Patient reports they are "getting more sleep" and "appetite is getting better", however, they are now experiencing palpitations when they lie down at night. They state the palpitations cause chest tightness in the morning. The pt. reports they have experienced this during their prior hospitalization and the Sx eventually attenuated. They also report some excitement surrounding their identity. They state they plan to be more involved in the community upon discharge The pt. reports they still feel prepared for discharge next week.   The pt. denied A/H, V/H, s/i/p, h/i/p and delusions.

## 2022-11-25 NOTE — BH INPATIENT PSYCHIATRY PROGRESS NOTE - NSBHMETABOLIC_PSY_ALL_CORE_FT
BMI: BMI (kg/m2): 39.5 (11-09-22 @ 13:46)  HbA1c: A1C with Estimated Average Glucose Result: 4.9 % (11-10-22 @ 08:00)    Glucose: POCT Blood Glucose.: 101 mg/dL (12-15-21 @ 00:29)    BP: 115/68 (11-25-22 @ 12:05) (110/63 - 115/68)  Lipid Panel: Date/Time: 11-10-22 @ 08:00  Cholesterol, Serum: 209  Direct LDL: --  HDL Cholesterol, Serum: 26  Total Cholesterol/HDL Ration Measurement: --  Triglycerides, Serum: 105

## 2022-11-25 NOTE — BH INPATIENT PSYCHIATRY PROGRESS NOTE - NSBHCHARTREVIEWVS_PSY_A_CORE FT
Vital Signs Last 24 Hrs  T(C): 36.4 (11-25-22 @ 19:15), Max: 36.4 (11-25-22 @ 14:37)  T(F): 97.6 (11-25-22 @ 19:15), Max: 97.6 (11-25-22 @ 14:37)  HR: 66 (11-25-22 @ 12:05) (66 - 66)  BP: 115/68 (11-25-22 @ 12:05) (115/68 - 115/68)  BP(mean): --  RR: 17 (11-25-22 @ 12:05) (17 - 17)  SpO2: --    Orthostatic VS  11-25-22 @ 19:15  Lying BP: --/-- HR: --  Sitting BP: 118/72 HR: 86  Standing BP: 116/68 HR: 94  Site: --  Mode: --  Orthostatic VS  11-25-22 @ 08:28  Lying BP: --/-- HR: --  Sitting BP: 110/96 HR: 70  Standing BP: 112/72 HR: 75  Site: --  Mode: --  Orthostatic VS  11-24-22 @ 19:03  Lying BP: --/-- HR: --  Sitting BP: 114/69 HR: 68  Standing BP: 100/64 HR: 70  Site: --  Mode: --  Orthostatic VS  11-24-22 @ 08:05  Lying BP: --/-- HR: --  Sitting BP: 119/70 HR: 71  Standing BP: 115/76 HR: 90  Site: --  Mode: --

## 2022-11-25 NOTE — BH INPATIENT PSYCHIATRY PROGRESS NOTE - PRN MEDS
MEDICATIONS  (PRN):  albuterol    90 MICROgram(s) HFA Inhaler 2 Puff(s) Inhalation every 6 hours PRN asthma  bismuth subsalicylate Liquid 30 milliLiter(s) Oral every 4 hours PRN N/V  diphenhydrAMINE Injectable 50 milliGRAM(s) IntraMuscular once PRN Agitation  haloperidol     Tablet 5 milliGRAM(s) Oral every 6 hours PRN Agitation  haloperidol    Injectable 5 milliGRAM(s) IntraMuscular once PRN Agitation  hydrOXYzine hydrochloride 50 milliGRAM(s) Oral every 6 hours PRN Anxiety  LORazepam     Tablet 2 milliGRAM(s) Oral every 6 hours PRN Agitation  LORazepam   Injectable 2 milliGRAM(s) IntraMuscular once PRN Agitation  ondansetron    Tablet 4 milliGRAM(s) Oral every 8 hours PRN nausea

## 2022-11-25 NOTE — BH INPATIENT PSYCHIATRY PROGRESS NOTE - CURRENT MEDICATION
MEDICATIONS  (STANDING):  hydrOXYzine hydrochloride 50 milliGRAM(s) Oral at bedtime  influenza   Vaccine 0.5 milliLiter(s) IntraMuscular once  LORazepam     Tablet 0.5 milliGRAM(s) Oral <User Schedule>  LORazepam     Tablet 1 milliGRAM(s) Oral daily  pantoprazole    Tablet 40 milliGRAM(s) Oral before breakfast  propranolol 20 milliGRAM(s) Oral three times a day  sertraline 100 milliGRAM(s) Oral daily    MEDICATIONS  (PRN):  albuterol    90 MICROgram(s) HFA Inhaler 2 Puff(s) Inhalation every 6 hours PRN asthma  bismuth subsalicylate Liquid 30 milliLiter(s) Oral every 4 hours PRN N/V  diphenhydrAMINE Injectable 50 milliGRAM(s) IntraMuscular once PRN Agitation  haloperidol     Tablet 5 milliGRAM(s) Oral every 6 hours PRN Agitation  haloperidol    Injectable 5 milliGRAM(s) IntraMuscular once PRN Agitation  hydrOXYzine hydrochloride 50 milliGRAM(s) Oral every 6 hours PRN Anxiety  LORazepam     Tablet 2 milliGRAM(s) Oral every 6 hours PRN Agitation  LORazepam   Injectable 2 milliGRAM(s) IntraMuscular once PRN Agitation  ondansetron    Tablet 4 milliGRAM(s) Oral every 8 hours PRN nausea

## 2022-11-25 NOTE — BH INPATIENT PSYCHIATRY PROGRESS NOTE - NSBHASSESSSUMMFT_PSY_ALL_CORE
32-year-old  nonbinary person unemployed living with spouse and her family, with diagnosis of MDD with history of one prior psychiatric hospitalization at [facility] from 7/21-7/29/2022 for suicidal thoughts, treated at [facility] since Aug 2022, currently on Lexapro, Ativan, and Propranolol, no history of self-injurious behavior or suicide attempts, no h/o violence or legal issues, PMH GERD and Asthma, no h/o substance abuse, referred by [facility] providers for SI and worsening depression/anxiety.     Pt reports attenuating GI Sx, but is now experiencing palpitations. Pt. has experienced this Sx previously and recognizes this as a Sx of anxiety. Pt reports overall attenuating of anxiety. Reports and reflects fluctuating mood Sx in the context of anticipating discharge, anxiety and changes related to gender identity. Pt. denies active/passive SI. Plan for discharge Tuesday.  Serotonin syndrome ruled out.      Plan:     Psychiatry:  Continue Zoloft 100mg--MDD  Ativan 0.5mg afternoon and hs; 1mg AM -- Anxiety  Propranolol 20mg TID--Anxiety  Hydroxyzine hydrochloride 50mg qhs -- Anxiety & somatic complaints    MEDICATIONS  (PRN):  albuterol    90 MICROgram(s) HFA Inhaler 2 Puff(s) Inhalation every 6 hours PRN asthma  diphenhydrAMINE Injectable 50 milliGRAM(s) IntraMuscular once PRN Agitation  haloperidol     Tablet 5 milliGRAM(s) Oral every 6 hours PRN Agitation  haloperidol    Injectable 5 milliGRAM(s) IntraMuscular once PRN Agitation  hydrOXYzine hydrochloride 50 milliGRAM(s) Oral every 6 hours PRN Anxiety  LORazepam     Tablet 2 milliGRAM(s) Oral every 6 hours PRN Agitation  LORazepam   Injectable 2 milliGRAM(s) IntraMuscular once PRN Agitation    Observation: routine, pt. is not an acute risk for suicide while inpatient; no indication for CO    Legal: 9.13 voluntary admission    Medical: Asthma--albuterol rescue inhaler  GERD--protonix 40mg daily  Routine medical followup; TSH, lipid panel and HA1C in the morning  Sx reduction, medication management, safety planning, milieu therapy.  Medical: consult with hospitalist regarding reported GERD and esophageal blockage. Chest Xray was unremarkable, however, pt. reports Hx of EGD and esophageal dilation.     Dispo: pending

## 2022-11-26 RX ADMIN — Medication 50 MILLIGRAM(S): at 20:30

## 2022-11-26 RX ADMIN — Medication 1 MILLIGRAM(S): at 09:03

## 2022-11-26 RX ADMIN — PANTOPRAZOLE SODIUM 40 MILLIGRAM(S): 20 TABLET, DELAYED RELEASE ORAL at 06:34

## 2022-11-26 RX ADMIN — Medication 0.5 MILLIGRAM(S): at 15:35

## 2022-11-26 RX ADMIN — SERTRALINE 100 MILLIGRAM(S): 25 TABLET, FILM COATED ORAL at 09:03

## 2022-11-26 RX ADMIN — Medication 0.5 MILLIGRAM(S): at 20:30

## 2022-11-27 RX ADMIN — Medication 1 MILLIGRAM(S): at 09:49

## 2022-11-27 RX ADMIN — PANTOPRAZOLE SODIUM 40 MILLIGRAM(S): 20 TABLET, DELAYED RELEASE ORAL at 09:48

## 2022-11-27 RX ADMIN — Medication 0.5 MILLIGRAM(S): at 14:03

## 2022-11-27 RX ADMIN — Medication 0.5 MILLIGRAM(S): at 21:14

## 2022-11-27 RX ADMIN — SERTRALINE 100 MILLIGRAM(S): 25 TABLET, FILM COATED ORAL at 09:48

## 2022-11-27 RX ADMIN — Medication 50 MILLIGRAM(S): at 21:14

## 2022-11-28 PROCEDURE — 99231 SBSQ HOSP IP/OBS SF/LOW 25: CPT

## 2022-11-28 RX ORDER — HYDROXYZINE HCL 10 MG
1 TABLET ORAL
Qty: 28 | Refills: 0
Start: 2022-11-28 | End: 2022-12-04

## 2022-11-28 RX ORDER — PROPRANOLOL HCL 160 MG
1 CAPSULE, EXTENDED RELEASE 24HR ORAL
Qty: 42 | Refills: 0
Start: 2022-11-28 | End: 2022-12-11

## 2022-11-28 RX ORDER — SERTRALINE 25 MG/1
1 TABLET, FILM COATED ORAL
Qty: 30 | Refills: 0
Start: 2022-11-28 | End: 2022-12-27

## 2022-11-28 RX ORDER — PROPRANOLOL HCL 160 MG
1 CAPSULE, EXTENDED RELEASE 24HR ORAL
Qty: 0 | Refills: 0 | DISCHARGE
Start: 2022-11-28

## 2022-11-28 RX ADMIN — PANTOPRAZOLE SODIUM 40 MILLIGRAM(S): 20 TABLET, DELAYED RELEASE ORAL at 08:28

## 2022-11-28 RX ADMIN — SERTRALINE 100 MILLIGRAM(S): 25 TABLET, FILM COATED ORAL at 08:28

## 2022-11-28 RX ADMIN — Medication 50 MILLIGRAM(S): at 21:05

## 2022-11-28 RX ADMIN — Medication 0.5 MILLIGRAM(S): at 21:04

## 2022-11-28 RX ADMIN — Medication 0.5 MILLIGRAM(S): at 12:40

## 2022-11-28 RX ADMIN — Medication 1 MILLIGRAM(S): at 08:28

## 2022-11-28 NOTE — BH PSYCHOLOGY - CLINICIAN PSYCHOTHERAPY NOTE - NSBHPSYCHOLRESPONSE_PSY_A_CORE
Accepted support
Symptoms reduced/Insight displayed/Accepted support
Accepted support
Symptoms reduced/Accepted support

## 2022-11-28 NOTE — BH PSYCHOLOGY - CLINICIAN PSYCHOTHERAPY NOTE - NSBHPSYCHOLPROBS_PSY_ALL_CORE
Anxiety/Depression
Depression/Suicidality
Anxiety/Depression
Anxiety/Depression

## 2022-11-28 NOTE — BH PSYCHOLOGY - CLINICIAN PSYCHOTHERAPY NOTE - NSBHPSYCHOLINT_PSY_A_CORE
Cognitive/behavioral therapy/Supportive therapy/other...
Cognitive/behavioral therapy/Supportive therapy/other...
Cognitive/behavioral therapy/Supported coping skills/Supportive therapy/other...
Cognitive/behavioral therapy/Supportive therapy/other...

## 2022-11-28 NOTE — BH DISCHARGE NOTE NURSING/SOCIAL WORK/PSYCH REHAB - PATIENT PORTAL LINK FT
You can access the FollowMyHealth Patient Portal offered by [facility] by registering at the following website: [email or web address]. By joining [facility]’s FollowMyHealth portal, you will also be able to view your health information using other applications (apps) compatible with our system.

## 2022-11-28 NOTE — BH INPATIENT PSYCHIATRY PROGRESS NOTE - NSBHPROGMEDSE_PSY_A_CORE FT
erectile dysfunction
increased agitation, erectile dysfunction
erectile dysfunction
GI cramps/diarrhea, erectile dysfunction
GI cramps/diarrhea, erectile dysfunction
irritability, erectile dysfunction
irritability, erectile dysfunction
irritability, possible drug induced jose, erectile dysfunction
erectile dysfunction
erectile dysfunction

## 2022-11-28 NOTE — BH INPATIENT PSYCHIATRY PROGRESS NOTE - NSBHCHARTREVIEWVS_PSY_A_CORE FT
Vital Signs Last 24 Hrs  T(C): 35.6 (11-28-22 @ 06:26), Max: 36.2 (11-27-22 @ 14:17)  T(F): 96 (11-28-22 @ 06:26), Max: 97.1 (11-27-22 @ 14:17)  HR: 72 (11-27-22 @ 14:00) (72 - 72)  BP: 123/68 (11-27-22 @ 14:00) (123/68 - 123/68)  BP(mean): --  RR: --  SpO2: --    Orthostatic VS  11-28-22 @ 08:38  Lying BP: --/-- HR: --  Sitting BP: 124/68 HR: 75  Standing BP: 123/76 HR: 76  Site: --  Mode: --  Orthostatic VS  11-27-22 @ 07:51  Lying BP: --/-- HR: --  Sitting BP: 125/83 HR: 75  Standing BP: 127/73 HR: 79  Site: --  Mode: --   Vital Signs Last 24 Hrs  T(C): 36 (11-28-22 @ 14:39), Max: 36 (11-28-22 @ 14:39)  T(F): 96.8 (11-28-22 @ 14:39), Max: 96.8 (11-28-22 @ 14:39)  HR: --  BP: --  BP(mean): --  RR: --  SpO2: --    Orthostatic VS  11-28-22 @ 08:38  Lying BP: --/-- HR: --  Sitting BP: 124/68 HR: 75  Standing BP: 123/76 HR: 76  Site: --  Mode: --  Orthostatic VS  11-27-22 @ 07:51  Lying BP: --/-- HR: --  Sitting BP: 125/83 HR: 75  Standing BP: 127/73 HR: 79  Site: --  Mode: --

## 2022-11-28 NOTE — BH PSYCHOLOGY - CLINICIAN PSYCHOTHERAPY NOTE - NSBHPTASSESSDT_PSY_A_CORE
10-Nov-2022 13:30
28-Nov-2022 11:45
18-Nov-2022 13:45
17-Nov-2022 11:45
14-Nov-2022 13:00
22-Nov-2022 11:00

## 2022-11-28 NOTE — BH DISCHARGE NOTE NURSING/SOCIAL WORK/PSYCH REHAB - DISCHARGE INSTRUCTIONS AFTERCARE APPOINTMENTS
In order to check the location, date, or time of your aftercare appointment, please refer to your Discharge Instructions Document given to you upon leaving the hospital.  If you have lost the instructions please call [phone]

## 2022-11-28 NOTE — BH DISCHARGE NOTE NURSING/SOCIAL WORK/PSYCH REHAB - NSCDUDCCRISIS_PSY_A_CORE
[facility] Well  1 [phone] [facility]-WELL ([phone])  Text "WELL" to [phone]  Website: www.[employer]/.[employer] [facility] 1 [phone] 621-HOPE (0718) Website: www.safehorizon.org/.  [facility] - [facility] – Crisis Care for Children, Adults and Families  [address] [place] [address]  Mobile Crisis Hotline – (234) 434-2627/.National Suicide Prevention Lifeline 9 (157) 607-9979/.  [facility]  1 (003) LIFENET (595-5682)/.  [facility]  (336) 622-9525/.  [facility] Behavioral Health Helpline / [facility] Mobile Crisis  [phone]) 189-NHUJ ([phone])/.  [facility] Response Crisis Hotline  (226) 513-5827  24 hour telephone crisis intervention and suicide prevention hotline concerned with all mental health issues/.  [facility]  [address] [place] [address] [phone]   Hours:  Monday through Friday from 9 AM to 3 PM/988 Suicide and Crisis Lifeline

## 2022-11-28 NOTE — BH PSYCHOLOGY - CLINICIAN PSYCHOTHERAPY NOTE - NSBHPSYCHOLINT_PSY_A_CORE FT
Acceptance and Commitment Therapy 

## 2022-11-28 NOTE — BH PSYCHOLOGY - CLINICIAN PSYCHOTHERAPY NOTE - TOKEN PULL-DIAGNOSIS
Primary Diagnosis:  Severe episode of recurrent major depressive disorder, without psychotic features [F33.2]      Major depressive disorder, single episode with anxious distress [F32.9]      Generalized anxiety disorder with panic attacks [F41.1]      Severe episode of recurrent major depressive disorder, without psychotic features [F33.2]        Problem Dx:   Somatic preoccupation [F45.9]      Post traumatic stress disorder (PTSD) [F43.10]      Severe episode of recurrent major depressive disorder, without psychotic features [F33.2]      
Primary Diagnosis:  Severe episode of recurrent major depressive disorder, without psychotic features [F33.2]      Major depressive disorder, single episode with anxious distress [F32.9]      Generalized anxiety disorder with panic attacks [F41.1]      Severe episode of recurrent major depressive disorder, without psychotic features [F33.2]        Problem Dx:   Somatic preoccupation [F45.9]      Post traumatic stress disorder (PTSD) [F43.10]      Severe episode of recurrent major depressive disorder, without psychotic features [F33.2]      
Primary Diagnosis:  Major depressive disorder, single episode with anxious distress [F32.9]      Generalized anxiety disorder with panic attacks [F41.1]      Severe episode of recurrent major depressive disorder, without psychotic features [F33.2]        Problem Dx:   Somatic preoccupation [F45.9]      Post traumatic stress disorder (PTSD) [F43.10]      Severe episode of recurrent major depressive disorder, without psychotic features [F33.2]      
Primary Diagnosis:  Severe episode of recurrent major depressive disorder, without psychotic features [F33.2]      Major depressive disorder, single episode with anxious distress [F32.9]      Generalized anxiety disorder with panic attacks [F41.1]      Severe episode of recurrent major depressive disorder, without psychotic features [F33.2]        Problem Dx:   Somatic preoccupation [F45.9]      Post traumatic stress disorder (PTSD) [F43.10]      Severe episode of recurrent major depressive disorder, without psychotic features [F33.2]      
Primary Diagnosis:  Generalized anxiety disorder with panic attacks [F41.1]      Severe episode of recurrent major depressive disorder, without psychotic features [F33.2]        Problem Dx:   Severe episode of recurrent major depressive disorder, without psychotic features [F33.2]      
Primary Diagnosis:  Severe episode of recurrent major depressive disorder, without psychotic features [F33.2]      Major depressive disorder, single episode with anxious distress [F32.9]      Generalized anxiety disorder with panic attacks [F41.1]      Severe episode of recurrent major depressive disorder, without psychotic features [F33.2]        Problem Dx:   Somatic preoccupation [F45.9]      Post traumatic stress disorder (PTSD) [F43.10]      Severe episode of recurrent major depressive disorder, without psychotic features [F33.2]

## 2022-11-28 NOTE — BH INPATIENT PSYCHIATRY PROGRESS NOTE - NSBHASSESSSUMMFT_PSY_ALL_CORE
32-year-old  nonbinary person unemployed living with spouse and her family, with diagnosis of MDD with history of one prior psychiatric hospitalization at [facility] from 7/21-7/29/2022 for suicidal thoughts, [facility] since Aug 20[facility], currently on Lexapro, Ativan, and Propranolol, no history of self-injurious behavior or suicide attempts, no h/o violence or legal issues, PMH GERD and Asthma, no h/o substance abuse, referred by [facility] providers for SI and worsening depression/anxiety.     On assessment today, patient reported improved mood and denies any SI.  Continue with plan as per primary team:     Plan:  Psychiatry:  Continue Zoloft 100mg--MDD  Ativan 0.5mg afternoon and hs; 1mg AM -- Anxiety  Propranolol 20mg TID--Anxiety  Hydroxyzine hydrochloride 50mg qhs -- Anxiety & somatic complaints    MEDICATIONS  (PRN):  albuterol    90 MICROgram(s) HFA Inhaler 2 Puff(s) Inhalation every 6 hours PRN asthma  diphenhydrAMINE Injectable 50 milliGRAM(s) IntraMuscular once PRN Agitation  haloperidol     Tablet 5 milliGRAM(s) Oral every 6 hours PRN Agitation  haloperidol    Injectable 5 milliGRAM(s) IntraMuscular once PRN Agitation  hydrOXYzine hydrochloride 50 milliGRAM(s) Oral every 6 hours PRN Anxiety  LORazepam     Tablet 2 milliGRAM(s) Oral every 6 hours PRN Agitation  LORazepam   Injectable 2 milliGRAM(s) IntraMuscular once PRN Agitation    Observation: routine, pt. is not an acute risk for suicide while inpatient; no indication for CO    Legal: 9.13 voluntary admission    Medical: Asthma--albuterol rescue inhaler  GERD--protonix 40mg daily  Routine medical followup; TSH, lipid panel and HA1C in the morning  Sx reduction, medication management, safety planning, milieu therapy.  Medical: consult with hospitalist regarding reported GERD and esophageal blockage. Chest Xray was unremarkable, however, pt. reports Hx of EGD and esophageal dilation.     Dispo: pending

## 2022-11-28 NOTE — BH PSYCHOLOGY - CLINICIAN PSYCHOTHERAPY NOTE - NSBHPSYCHOLGOALS_PSY_A_CORE
Decrease symptoms/Assessment/Improve level of independent functioning/Improve social/vocational/coping skills
Decrease symptoms/Improve social/vocational/coping skills
Decrease symptoms/Assessment/Improve social/vocational/coping skills
Decrease symptoms/Improve social/vocational/coping skills
Decrease symptoms/Improve social/vocational/coping skills
Decrease symptoms/Improve social/vocational/coping skills/Prevent relapse

## 2022-11-28 NOTE — BH PSYCHOLOGY - CLINICIAN PSYCHOTHERAPY NOTE - NSBHPSYCHOLASSESSPROV_PSY_A_CORE
Psychology Trainee only
Licensed Psychologist
Psychology Trainee only

## 2022-11-28 NOTE — BH INPATIENT PSYCHIATRY PROGRESS NOTE - NSBHMETABOLIC_PSY_ALL_CORE_FT
BMI: BMI (kg/m2): 39.5 (11-09-22 @ 13:46)  HbA1c: A1C with Estimated Average Glucose Result: 4.9 % (11-10-22 @ 08:00)    Glucose: POCT Blood Glucose.: 101 mg/dL (12-15-21 @ 00:29)    BP: 123/68 (11-27-22 @ 14:00) (123/68 - 123/68)  Lipid Panel: Date/Time: 11-10-22 @ 08:00  Cholesterol, Serum: 209  Direct LDL: --  HDL Cholesterol, Serum: 26  Total Cholesterol/HDL Ration Measurement: --  Triglycerides, Serum: 105

## 2022-11-28 NOTE — BH PSYCHOLOGY - CLINICIAN PSYCHOTHERAPY NOTE - NSBHPSYCHOLNARRATIVE_PSY_A_CORE FT
Writer and patient wore masks during the session due to COVID precautions. Pt was alert and appeared to have adequate hygiene and grooming. Pt denied any current suicidal ideation, intent, or plan, and did not endorse any homicidal ideation. Pt reported "excited" mood and displayed euthymic affect. Speech within normal limits. Thought processes linear and goal directed. Pt denied any auditory or visual hallucinations. Oriented x3. Pt demonstrated good insight and judgment.     Pt reflected on treatment progress/gains as they anticipate discharge tomorrow. Pt reported improved acceptance of their identity and an awareness of tendency to use cognitive distortions, such as catastrophizing. Pt reported improved ability to manage others' reactions to their identity changes. Writer reflected these gains to pt, and emphasized their value in the post-discharge process. Support, validation, and empathic listening provided.

## 2022-11-28 NOTE — BH INPATIENT PSYCHIATRY PROGRESS NOTE - NSBHFUPINTERVALHXFT_PSY_A_CORE
Chart reviewed and case discussed with treatment team. No events reported overnight. Sleep and appetite is "better". Patient denies any SI and is future oriented. He discussed his plans to follow up with GI after discharge for ongoing issues. He complained of constipation today but refused Miralax PRN. Patient is compliant with medications, no adverse effects reported.   Chart reviewed and case discussed with treatment team. No events reported overnight. Sleep and appetite is "better". Patient denies any SI and is future oriented. They discussed their plans to follow up with GI after discharge for ongoing issues. Patient complained of constipation today but refused Miralax PRN. Patient is compliant with medications, no adverse effects reported.

## 2022-11-29 VITALS — TEMPERATURE: 97 F

## 2022-11-29 DIAGNOSIS — F41.0 PANIC DISORDER [EPISODIC PAROXYSMAL ANXIETY]: ICD-10-CM

## 2022-11-29 PROCEDURE — 99239 HOSP IP/OBS DSCHRG MGMT >30: CPT

## 2022-11-29 RX ADMIN — Medication 1 MILLIGRAM(S): at 08:31

## 2022-11-29 RX ADMIN — PANTOPRAZOLE SODIUM 40 MILLIGRAM(S): 20 TABLET, DELAYED RELEASE ORAL at 06:40

## 2022-11-29 RX ADMIN — SERTRALINE 100 MILLIGRAM(S): 25 TABLET, FILM COATED ORAL at 08:30

## 2022-11-29 NOTE — BH INPATIENT PSYCHIATRY PROGRESS NOTE - NSBHFUPINTERVALHXFT_PSY_A_CORE
Patient is followed up for MDD, with somatic complaints.  Admitted for depression and passive suicide thoughts. Chart, medications and labs reviewed.  Patient is discussed with nursing staff.  No significant interval events. Nursing reports patient remains compliant with all standing medications and tolerating well.   Pt is feeling excited and anxious about discharge today. Overall, they are feeling much better since admission, reporting less anxiety and somatic symptoms. GI symptoms are "here and there," but are easily manageable. They are excited to see friends and family and spend time with their wife. They are also excited about being more femme presenting in the community.  The pt. denied A/H, V/H, s/i/p, h/i/p and delusions. Patient is followed up for MDD, with somatic complaints.  Admitted for depression and passive suicide thoughts. Chart, medications and labs reviewed.  Patient is discussed with nursing staff.  No significant interval events. Nursing reports patient remains compliant with all standing medications and tolerating well.   Pt is feeling excited and anxious about discharge today. Overall, they are feeling much better since admission, reporting less anxiety and somatic symptoms. GI symptoms are "here and there," but are easily manageable. They are excited to see friends and family and spend time with their wife. They are also excited about being more femme presenting in the community.  The pt. denied A/H, V/H, s/i/p, h/i/p and delusions. The pt. was advised to call 911, visit the nearest ED or the crisis clinic if Sx worsen.

## 2022-11-29 NOTE — BH INPATIENT PSYCHIATRY PROGRESS NOTE - NSBHMSESPEECH_PSY_A_CORE
Normal volume, rate, productivity, spontaneity and articulation

## 2022-11-29 NOTE — BH INPATIENT PSYCHIATRY PROGRESS NOTE - NSTXDEPRESDATEEST_PSY_ALL_CORE
10-Nov-2022
16-Nov-2022
10-Nov-2022
16-Nov-2022
23-Nov-2022
10-Nov-2022
16-Nov-2022
10-Nov-2022
10-Nov-2022

## 2022-11-29 NOTE — BH PSYCHOLOGY - GROUP THERAPY NOTE - NSPSYCHOLGRPCOGPROB_PSY_A_CORE FT
Anxiety, depression, emotion dysregulation, lack of coping skills, poor problem solving, self-care

## 2022-11-29 NOTE — BH INPATIENT PSYCHIATRY PROGRESS NOTE - NSTXSUICIDDATEEST_PSY_ALL_CORE
09-Nov-2022

## 2022-11-29 NOTE — BH PSYCHOLOGY - GROUP THERAPY NOTE - TOKEN PULL-DIAGNOSIS
Primary Diagnosis:  Major depressive disorder, single episode with anxious distress [F32.9]      Generalized anxiety disorder with panic attacks [F41.1]      Severe episode of recurrent major depressive disorder, without psychotic features [F33.2]        Problem Dx:   Somatic preoccupation [F45.9]      Post traumatic stress disorder (PTSD) [F43.10]      Severe episode of recurrent major depressive disorder, without psychotic features [F33.2]      
Primary Diagnosis:  Severe episode of recurrent major depressive disorder, without psychotic features [F33.2]      Major depressive disorder, single episode with anxious distress [F32.9]      Generalized anxiety disorder with panic attacks [F41.1]      Severe episode of recurrent major depressive disorder, without psychotic features [F33.2]        Problem Dx:   Panic disorder [F41.0]      Somatic preoccupation [F45.9]      Post traumatic stress disorder (PTSD) [F43.10]      Severe episode of recurrent major depressive disorder, without psychotic features [F33.2]      
Primary Diagnosis:  Severe episode of recurrent major depressive disorder, without psychotic features [F33.2]      Major depressive disorder, single episode with anxious distress [F32.9]      Generalized anxiety disorder with panic attacks [F41.1]      Severe episode of recurrent major depressive disorder, without psychotic features [F33.2]        Problem Dx:   Somatic preoccupation [F45.9]      Post traumatic stress disorder (PTSD) [F43.10]      Severe episode of recurrent major depressive disorder, without psychotic features [F33.2]      
Primary Diagnosis:  Major depressive disorder, single episode with anxious distress [F32.9]      Generalized anxiety disorder with panic attacks [F41.1]      Severe episode of recurrent major depressive disorder, without psychotic features [F33.2]        Problem Dx:   Somatic preoccupation [F45.9]      Post traumatic stress disorder (PTSD) [F43.10]      Severe episode of recurrent major depressive disorder, without psychotic features [F33.2]      
Primary Diagnosis:  Severe episode of recurrent major depressive disorder, without psychotic features [F33.2]      Major depressive disorder, single episode with anxious distress [F32.9]      Generalized anxiety disorder with panic attacks [F41.1]      Severe episode of recurrent major depressive disorder, without psychotic features [F33.2]        Problem Dx:   Somatic preoccupation [F45.9]      Post traumatic stress disorder (PTSD) [F43.10]      Severe episode of recurrent major depressive disorder, without psychotic features [F33.2]      

## 2022-11-29 NOTE — BH INPATIENT PSYCHIATRY PROGRESS NOTE - NSDCCRITERIA_PSY_ALL_CORE
Sx reduction/remission, decreased risk of harm
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
## 2022-11-29 NOTE — BH PSYCHOLOGY - GROUP THERAPY NOTE - NSBHPSYCHOLGRPTYPE_PSY_A_CORE
Cognitive Behavioral Coping Skills

## 2022-11-29 NOTE — BH PSYCHOLOGY - GROUP THERAPY NOTE - NSBHPSYCHOLRESPONSE_PSY_A_CORE
Insight displayed/Accepted support
Accepted support
Coping skills acquired/Accepted support
Insight displayed/Accepted support
Insight displayed/Accepted support

## 2022-11-29 NOTE — BH INPATIENT PSYCHIATRY PROGRESS NOTE - NSTXDCOTHRGOAL_PSY_ALL_CORE
Pt will show a reduction of symptoms and engage meaningfully with writer to identify a safe discharge plan.

## 2022-11-29 NOTE — BH INPATIENT PSYCHIATRY PROGRESS NOTE - NSBHASSESSSUMMFT_PSY_ALL_CORE
32-year-old  nonbinary person unemployed living with spouse and her family, with diagnosis of MDD with history of one prior psychiatric hospitalization at [facility] from 7/21-7/29/2022 for suicidal thoughts, [facility] Aug 2022, currently on Lexapro, Ativan, and Propranolol, no history of self-injurious behavior or suicide attempts, no h/o violence or legal issues, PMH GERD and Asthma, no h/o substance abuse, referred by [facility] providers for SI and worsening depression/anxiety.     Pt has improved since admission and is adherent to treatment as prescribed inpatient. Pt is excited about d/c today. F/u scheduled with [facility]    Plan:     Psychiatry:  Continue Zoloft 100mg--MDD  Ativan 0.5mg TID -- Anxiety  Propranolol 20mg TID--Anxiety  Hydroxyzine hydrochloride 50mg qhs -- Anxiety & somatic complaints    MEDICATIONS  (PRN):  albuterol    90 MICROgram(s) HFA Inhaler 2 Puff(s) Inhalation every 6 hours PRN asthma  diphenhydrAMINE Injectable 50 milliGRAM(s) IntraMuscular once PRN Agitation  haloperidol     Tablet 5 milliGRAM(s) Oral every 6 hours PRN Agitation  haloperidol    Injectable 5 milliGRAM(s) IntraMuscular once PRN Agitation  hydrOXYzine hydrochloride 50 milliGRAM(s) Oral every 6 hours PRN Anxiety  LORazepam     Tablet 2 milliGRAM(s) Oral every 6 hours PRN Agitation  LORazepam   Injectable 2 milliGRAM(s) IntraMuscular once PRN Agitation    Observation: routine, pt. is not an acute risk for suicide while inpatient; no indication for CO    Legal: 9.13 voluntary admission    Medical: Asthma--albuterol rescue inhaler  GERD--protonix 40mg daily  Routine medical followup; TSH, lipid panel and HA1C in the morning  Sx reduction, medication management, safety planning, milieu therapy.  Medical: consult with hospitalist regarding reported GERD and esophageal blockage. Chest Xray was unremarkable, however, pt. reports Hx of EGD and esophageal dilation.     Dispo: d/c home, picked up by wife. f/u with [facility] 32-year-old  nonbinary person unemployed living with spouse and her family, with diagnosis of MDD with history of one prior psychiatric hospitalization at [facility] from 7/21-7/29/2022 for suicidal thoughts, treated at [facility] since Aug 2022, currently on Lexapro, Ativan, and Propranolol, no history of self-injurious behavior or suicide attempts, no h/o violence or legal issues, PMH GERD and Asthma, no h/o substance abuse, referred by [facility] providers for SI and worsening depression/anxiety.     Pt has improved since admission and is adherent to treatment as prescribed inpatient. Pt is excited about d/c today. Pt. denies s/i/p. Endorses attenuated anxiety and mood Sx.  Pt. reports improving GI Sx and states palpitations have resolved. The pt. was advised to call 911, visit the nearest ED or the crisis clinic if Sx worsen.  F/u scheduled with [facility]    Plan:     Psychiatry:  Continue Zoloft 100mg--MDD  Ativan 1mg AM, 0.5mg afternoon and bedtime -- Anxiety  Propranolol 20mg TID--Anxiety  Hydroxyzine hydrochloride 50mg qhs -- Anxiety & somatic complaints    Dispo: d/c home, picked up by wife. f/u with [facility]

## 2022-11-29 NOTE — BH INPATIENT PSYCHIATRY DISCHARGE NOTE - NSDCRECOMMENDMEDICALFT_PSY_ALL_CORE
Follow-up with psychiatry, primary care and GI upon discharge  Avoid grapefruit juice while on Zoloft

## 2022-11-29 NOTE — BH PSYCHOLOGY - GROUP THERAPY NOTE - NSPSYCHOLGRPCOGINT_PSY_A_CORE FT
Cognitive behavioral therapy, Acceptance and Commitment Therapy, emotion regulation/coping skills taught, psychoeducation

## 2022-11-29 NOTE — BH INPATIENT PSYCHIATRY PROGRESS NOTE - NSTXPROBDCOTHR_PSY_ALL_CORE
DISCHARGE ISSUE - OTHER

## 2022-11-29 NOTE — BH INPATIENT PSYCHIATRY PROGRESS NOTE - NSBHROSSYSTEMS_PSY_ALL_CORE
Psychiatric
Gastrointestinal.../Psychiatric
Psychiatric

## 2022-11-29 NOTE — BH PSYCHOLOGY - GROUP THERAPY NOTE - NSBHPSYCHOLGRPNAME_PSY_A_CORE
CBT (Cognitive Behavioral Therapy) Group

## 2022-11-29 NOTE — BH INPATIENT PSYCHIATRY DISCHARGE NOTE - NSDCMRMEDTOKEN_GEN_ALL_CORE_FT
hydrOXYzine hydrochloride 50 mg oral tablet: 1 tab(s) orally every 6 hours, As needed, Anxiety  LORazepam 0.5 mg oral tablet: 1 tab(s) orally 2 times a day MDD:2mg  LORazepam 1 mg oral tablet: 1 tab(s) orally once a day MDD:2mg  propranolol 20 mg oral tablet: 1 tab(s) orally 3 times a day  sertraline 100 mg oral tablet: 1 tab(s) orally once a day

## 2022-11-29 NOTE — BH INPATIENT PSYCHIATRY PROGRESS NOTE - NSBHMSEKNOWHOW_PSY_ALL_CORE
Current Events
Current Events
Vocabulary
Vocabulary
Current Events
Vocabulary
Vocabulary
Current Events
Vocabulary
Current Events
Vocabulary
Current Events

## 2022-11-29 NOTE — BH PSYCHOLOGY - GROUP THERAPY NOTE - NSPSYCHOLGRPBILLING_PSY_A_CORE
[id number] - Group Psychotherapy
[id number] - Group Psychotherapy
[id number] - Group Psychotherapy
[id number] - Group Psychotherapy

## 2022-11-29 NOTE — BH INPATIENT PSYCHIATRY DISCHARGE NOTE - NSBHSUICIDESTATUS_PSY_ALL_CORE
Pt. denies active s/i/p and passive SI. Risk factors include depression, Hx of chronic SI, severe anxiety and panic,

## 2022-11-29 NOTE — BH PSYCHOLOGY - GROUP THERAPY NOTE - NSPSYCHOLGRPCOGPT_PSY_A_CORE FT
Pt attended Cognitive Behavioral Therapy Group. Acceptance and Commitment Therapy skills and concepts also utilized. The group started with a brief check-in during which patients shared one thoughtful thing someone else has done for them recently. This activity generated insightful participation and pts responded well to prompt. Next, the group discussed pts’ values across various domains (e.g., health, work, self-care) and the difference between goals and values. Pt reflected on values related to setting boundaries with family.  explained concepts, reinforced participation, and engaged patients in discussion. 
Pt attended Cognitive Behavioral Therapy Group. Acceptance and Commitment Therapy skills and concepts also utilized. The group started with a brief check-in during which patients shared a quality they value in themselves. This activity generated insightful participation and pts responded well to prompt. Next, the group discussed the qualities of a “healthy perspective” on emotions. Group leaders explained concepts, reinforced participation, and engaged patients in discussion. Pt reflected on the feeling that negative emotions will last forever. 
Pt attended Cognitive Behavioral Therapy Group. Acceptance and Commitment Therapy skills and concepts also utilized. The group started with a brief check-in during which patients shared one thing they are grateful for. This activity generated insightful participation and pts responded well to prompt. Next, the group completed a 5-4-3-2-1 grounding exercise. Lastly, the group discussed a handout of a cartoon that depicted the cost of trying to control one’s thoughts/emotions. Group leaders explained concepts, reinforced participation, and engaged patients in discussion. Pt shared that if they spent less time trying to control their thoughts, they would spend more time learning new things, like a language. 
Pt attended Cognitive Behavioral Therapy Group. Acceptance and Commitment Therapy skills and concepts also utilized. The group started with a brief check-in during which patients shared a helpful behavior they do when they’re having a bad day. This activity generated insightful participation and pts responded well to prompt. Next, the group discussed a handout that described ways to stop overthinking. Pt reflected on his personal struggle with overthinking. Group leaders explained concepts, reinforced participation, and engaged patients in discussion. 
Pt attended Cognitive Behavioral Therapy Group. Acceptance and Commitment Therapy skills and concepts also utilized. The group started with a brief check-in during which patients shared a person they admire and why. This activity generated insightful participation and pts responded well to prompt. Pt shared that he feels he lacks a role model in his life right now. Next, the group discussed a handout that described the “choice point,” representing the option to take actions that move you towards or away from your goals. Lastly, the group discussed a handout that identified things that are in vs outside of your control. Group leaders explained concepts, reinforced participation, and engaged patients in discussion.
Patient attended Cognitive Behavioral Therapy Group utilizing concepts and skills from Acceptance and Commitment Therapy (ACT). The group started with a brief check in and mindfulness /relaxation exercise. The group then focused on the topic of distress tolerance and self care. Patients discussed acceptance of emotional experience and the concept of distress tolerance. Patients shared examples of healthy ways to cope with distress and ways to engage in healthy self-care (balanced sleep, healthy eating etc). The  explained concepts, reinforced participation, and engaged patients in the discussion.  
Pt attended Cognitive Behavioral Therapy Group. Acceptance and Commitment Therapy skills and concepts also utilized. The group started with a brief check-in during which patients shared one intention for the day. This activity generated insightful participation and pts responded well to prompt. Pts then engaged in a progressive muscle relaxation exercise. Next, the group discussed a handout that outlined affirmations to address anxiety. Pt expressed appreciation for affirmation about getting through other challenges successfully. Group leaders explained concepts, reinforced participation, and engaged patients in discussion.

## 2022-11-29 NOTE — BH INPATIENT PSYCHIATRY PROGRESS NOTE - NSTXDEPRESGOAL_PSY_ALL_CORE
Will identify 2 coping skills that assist in improving mood
Report that he/she had enough energy and motivation to meet with a visitor daily
Will identify 2 coping skills that assist in improving mood

## 2022-11-29 NOTE — BH INPATIENT PSYCHIATRY PROGRESS NOTE - NSTXSUICIDGOAL_PSY_ALL_CORE
Will identify and utilize 2 coping skills

## 2022-11-29 NOTE — BH PSYCHOLOGY - GROUP THERAPY NOTE - NSPSYCHOLGRPCOGSPCH_PSY_A_CORE FT
Within normal limits 

## 2022-11-29 NOTE — BH INPATIENT PSYCHIATRY PROGRESS NOTE - NSBHMSEBODY_PSY_A_CORE
Overweight

## 2022-11-29 NOTE — BH INPATIENT PSYCHIATRY PROGRESS NOTE - GENERAL APPEARANCE
No deformities present/Unable to assess
No deformities present
No deformities present/Unable to assess
No deformities present/Unable to assess
No deformities present
No deformities present/Unable to assess
No deformities present/Unable to assess
No deformities present
No deformities present/Unable to assess
No deformities present

## 2022-11-29 NOTE — BH INPATIENT PSYCHIATRY PROGRESS NOTE - NSBHATTESTAPPBILLTIME_PSY_A_CORE
I attest my time as LOS is greater than 50% of the total combined time spent on qualifying patient care activities. I have reviewed and verified the documentation.
I attest my time as OLS is greater than 50% of the total combined time spent on qualifying patient care activities. I have reviewed and verified the documentation.
I attest my time as LOS is greater than 50% of the total combined time spent on qualifying patient care activities. I have reviewed and verified the documentation.

## 2022-11-29 NOTE — BH INPATIENT PSYCHIATRY DISCHARGE NOTE - HOSPITAL COURSE
to be completed Per inpatient psychiatric assessment: "32-year-old  male unemployed living with spouse and her family, with diagnosis of MDD with history of one prior psychiatric hospitalization at [facility] from 7/21-7/29/2022 for suicidal thoughts, treated at [facility] since Aug 2022, currently on Lexapro, Ativan, and Propranolol, no history of self-injurious behavior or suicide attempts, no h/o violence or legal issues, PMH GERD and Asthma, no h/o substance abuse, referred by [facility] providers for SI and worsening depression/anxiety.     Collateral from [facility] SW, Sera Escamilla: "He had been doing ok managing his symptoms compliant with treatment until about two weeks ago. He began to feel more anxious with an increase in somatic complaints. He reported feeling more easily overwhelmed and having difficulty taking care of ADLs spending more time in his room isolating. He had some increased stress surrounding his birthday last week turning 32 and his second wedding anniversary. He mentioned some new concerns about his gender identity. He had difficulty enjoying the plans made to celebrate and did not follow through on some of them, telling his wife that he just wasn't  up to going out.. He reported an increase in pain related to stomach issue and became more hyper focused on these complaints. He reached out for additional support several times in between session. He denied any suicidal thoughts but was feeling more hopeless and helpless last week.   Today pt reaches out reporting suicidal thoughts with new complaint of increased pain " in my esophagus". He reports pain to be unmanageable and he "can't take it anymore". He reported suicidal thoughts of " taking my army knife that I have here and stabbing myself" or overdose on medications. He was tearful and guilty about how he was feeling stating "I know I'm letting people down" but he was uncertain if after our zoom session he would be able to keep himself safe and not act on these thoughts. He agreed to ask a family member to take him to the ER and was requesting admission. He agreed to not act on any thoughts.  He is on prescribed medications as follows: Lexapro 20 MG, TAB, PO, Take one (1) tablet by mouth daily  LORazepam 0.5 MG, TAB, PO, Take one (1) tablet by mouth three times a day  Propranolol HCl 20 MG, TAB, PO, Take one (1) tablet by mouth three times a day  Protonix 40 MG, TCP, PO, Take one (1) tablet by mouth every morning, before meal"    On interview, pt states he was referred to ED by [facility] providers because "I don't feel safe." Pt reports worsening depression and anxiety over the past few weeks. He cites several stressors, including turning 32 years old last week, as well as questioning his "identity" (pt declined to disclose details while in the hallway in the medical ED), and persistent chest tightness and "burning." Pt acknowledges that his "stress" could be contributing to his chest discomfort. Of note, pt medically cleared by ED providers. Pt reports being unemployed since July 2022. States he has been spending most of the day in bed, with low energy and low motivation. He reports poor concentration, sometimes has interrupted sleep. He sometimes goes 1-2 days without eating because he is afraid eating will worsen his chest burning/tightness. He reports anhedonia and sometimes feels hopeless. For the past few days, pt has been experiencing SI with thoughts of starving himself or stabbing himself with a knife at home. Pt states he fears that he will act on it at home. He denies manic or psychotic symptoms. He denies homicidal or violent ideation, intent, or plan. His Ativan and Propranolol are prescribed as TID, but he has only been taking these meds in the morning with his other morning meds (Lexapro and Pantoprazole). States he had a drink on his 32th birthday last week, but otherwise he hasn't been drinking alcohol. States he hasn't used marijuana in about 1 year. He denies other substance use."  Upon admission, per inpatient psychiatric assessment, the patient presented with severe anxiety and panic attacks, w/ comorbid depression and SI w/ plan. The pt. reported plan to starve or stab themselves at home. They denied plan or intent or to harm themselves on the unit. The patient's anxiety presented as future oriented, with ruminations, perseveration, intrusiveness, and somatization. The pt. complained of worsening GI Sx including GERD, chest tightness and esophageal blockage. Pt. also experienced N/V/D while admitted due to panic Sx related to medication changes. Pt. also reported psychosocial stressors and questioning they’re gender identity. While admitted, the pt. was encouraged to explore their identity given they were in a restricted and safe environment. The pt. preferred pronouns they/them. They were switched to a private room where they felt more comfortable. The pt. agreed with possible OCD diagnosis given description of anxiety Sx. They were agreeable to cross titration from Lexapro to Zoloft. Ativan was also titrated for immediate anxiety relief.   The pt. began to report attenuation of anxiety and GI Sx with titration of Zoloft. They then began to c/o restlessness, difficulty sleeping and irritability. The writer was concerned for possible medication-induced jose. The pt. agreed to trial lithium, but later declined. The pt. was closely monitored for additional manic Sx and none were observed. They believe those Sx were more so attributed to psychosocial stressors, medication changes and a disagreement with their wife while admitted. The pt. also reported feeling unwell, with N/V/D during Zoloft titration. They tested negative for COVID-19, CBC and CMP were WNL. Serotonin syndrome was also considered therefore Zoloft was tapered. Serotonin syndrome was ruled out, the patient’s Sx resolved. Patient’s anxiety and mood gradually stabilized. The pt. was no longer an acute risk and was appropriate for discharge.       Per inpatient psychiatric assessment: "32-year-old  male unemployed living with spouse and her family, with diagnosis of MDD with history of one prior psychiatric hospitalization at [facility] from 7/21-7/29/2022 for suicidal thoughts, treated at [facility] since Aug 2022, currently on Lexapro, Ativan, and Propranolol, no history of self-injurious behavior or suicide attempts, no h/o violence or legal issues, PMH GERD and Asthma, no h/o substance abuse, referred by [facility] providers for SI and worsening depression/anxiety.     Collateral from [facility] SW, Sera Escamilla: "He had been doing ok managing his symptoms compliant with treatment until about two weeks ago. He began to feel more anxious with an increase in somatic complaints. He reported feeling more easily overwhelmed and having difficulty taking care of ADLs spending more time in his room isolating. He had some increased stress surrounding his birthday last week turning 32 and his second wedding anniversary. He mentioned some new concerns about his gender identity. He had difficulty enjoying the plans made to celebrate and did not follow through on some of them, telling his wife that he just wasn't  up to going out.. He reported an increase in pain related to stomach issue and became more hyper focused on these complaints. He reached out for additional support several times in between session. He denied any suicidal thoughts but was feeling more hopeless and helpless last week.   Today pt reaches out reporting suicidal thoughts with new complaint of increased pain " in my esophagus". He reports pain to be unmanageable and he "can't take it anymore". He reported suicidal thoughts of " taking my army knife that I have here and stabbing myself" or overdose on medications. He was tearful and guilty about how he was feeling stating "I know I'm letting people down" but he was uncertain if after our zoom session he would be able to keep himself safe and not act on these thoughts. He agreed to ask a family member to take him to the ER and was requesting admission. He agreed to not act on any thoughts.  He is on prescribed medications as follows: Lexapro 20 MG, TAB, PO, Take one (1) tablet by mouth daily  LORazepam 0.5 MG, TAB, PO, Take one (1) tablet by mouth three times a day  Propranolol HCl 20 MG, TAB, PO, Take one (1) tablet by mouth three times a day  Protonix 40 MG, TCP, PO, Take one (1) tablet by mouth every morning, before meal"    On interview, pt states he was referred to ED by [facility] providers because "I don't feel safe." Pt reports worsening depression and anxiety over the past few weeks. He cites several stressors, including turning 32 years old last week, as well as questioning his "identity" (pt declined to disclose details while in the hallway in the medical ED), and persistent chest tightness and "burning." Pt acknowledges that his "stress" could be contributing to his chest discomfort. Of note, pt medically cleared by ED providers. Pt reports being unemployed since July 2022. States he has been spending most of the day in bed, with low energy and low motivation. He reports poor concentration, sometimes has interrupted sleep. He sometimes goes 1-2 days without eating because he is afraid eating will worsen his chest burning/tightness. He reports anhedonia and sometimes feels hopeless. For the past few days, pt has been experiencing SI with thoughts of starving himself or stabbing himself with a knife at home. Pt states he fears that he will act on it at home. He denies manic or psychotic symptoms. He denies homicidal or violent ideation, intent, or plan. His Ativan and Propranolol are prescribed as TID, but he has only been taking these meds in the morning with his other morning meds (Lexapro and Pantoprazole). States he had a drink on his 32th birthday last week, but otherwise he hasn't been drinking alcohol. States he hasn't used marijuana in about 1 year. He denies other substance use."  Upon admission, per inpatient psychiatric assessment, the patient presented with severe anxiety and panic attacks, w/ comorbid depression and SI w/ plan. The pt. reported plan to starve or stab themselves at home. They denied plan or intent or to harm themselves on the unit. The patient's anxiety presented as future oriented, with ruminations, perseveration, intrusiveness, and somatization. The pt. complained of worsening GI Sx including GERD, chest tightness and esophageal blockage. Pt. also experienced N/V/D while admitted due to panic Sx related to medication changes. Pt. also reported psychosocial stressors and questioning they’re gender identity. While admitted, the pt. was encouraged to explore their identity given they were in a restricted and safe environment. The pt. preferred pronouns they/them. They were switched to a private room where they felt more comfortable. The pt. agreed with possible OCD diagnosis given description of anxiety Sx. They were agreeable to cross titration from Lexapro to Zoloft. Ativan was also titrated for immediate anxiety relief.   The pt. began to report attenuation of anxiety and GI Sx with titration of Zoloft. They then began to c/o restlessness, difficulty sleeping and irritability. The writer was concerned for possible medication-induced jose. The pt. agreed to trial lithium, but later declined. The pt. was closely monitored for additional manic Sx and none were observed. They believe those Sx were more so attributed to psychosocial stressors, medication changes and a disagreement with their wife while admitted. The pt. also reported feeling unwell, with N/V/D during Zoloft titration. They tested negative for COVID-19, CBC and CMP were WNL. Serotonin syndrome was also considered therefore Zoloft was tapered. Serotonin syndrome was ruled out, the patient’s Sx resolved. Patient’s anxiety and mood gradually stabilized. The pt. was no longer an acute risk and was appropriate for discharge.      Discharge medications:  hydrOXYzine hydrochloride 50 mg oral tablet: 1 tab(s) orally every 6 hours, As needed, Anxiety  LORazepam 0.5 mg oral tablet: 1 tab(s) orally 2 times a day MDD:2mg  LORazepam 1 mg oral tablet: 1 tab(s) orally once a day MDD:2mg  propranolol 20 mg oral tablet: 1 tab(s) orally 3 times a day  sertraline 100 mg oral tablet: 1 tab(s) orally once a day Per inpatient psychiatric assessment: "32-year-old  male unemployed living with spouse and her family, with diagnosis of MDD with history of one prior psychiatric hospitalization at [facility] from 7/21-7/29/2022 for suicidal thoughts, treated at [facility] since Aug 2022, currently on Lexapro, Ativan, and Propranolol, no history of self-injurious behavior or suicide attempts, no h/o violence or legal issues, PMH GERD and Asthma, no h/o substance abuse, referred by [facility] providers for SI and worsening depression/anxiety.     Collateral from [facility] SW, Sera Escamilla: "He had been doing ok managing his symptoms compliant with treatment until about two weeks ago. He began to feel more anxious with an increase in somatic complaints. He reported feeling more easily overwhelmed and having difficulty taking care of ADLs spending more time in his room isolating. He had some increased stress surrounding his birthday last week turning 32 and his second wedding anniversary. He mentioned some new concerns about his gender identity. He had difficulty enjoying the plans made to celebrate and did not follow through on some of them, telling his wife that he just wasn't  up to going out.. He reported an increase in pain related to stomach issue and became more hyper focused on these complaints. He reached out for additional support several times in between session. He denied any suicidal thoughts but was feeling more hopeless and helpless last week.   Today pt reaches out reporting suicidal thoughts with new complaint of increased pain " in my esophagus". He reports pain to be unmanageable and he "can't take it anymore". He reported suicidal thoughts of " taking my army knife that I have here and stabbing myself" or overdose on medications. He was tearful and guilty about how he was feeling stating "I know I'm letting people down" but he was uncertain if after our zoom session he would be able to keep himself safe and not act on these thoughts. He agreed to ask a family member to take him to the ER and was requesting admission. He agreed to not act on any thoughts.  He is on prescribed medications as follows: Lexapro 20 MG, TAB, PO, Take one (1) tablet by mouth daily  LORazepam 0.5 MG, TAB, PO, Take one (1) tablet by mouth three times a day  Propranolol HCl 20 MG, TAB, PO, Take one (1) tablet by mouth three times a day  Protonix 40 MG, TCP, PO, Take one (1) tablet by mouth every morning, before meal"    On interview, pt states he was referred to ED by [facility] providers because "I don't feel safe." Pt reports worsening depression and anxiety over the past few weeks. He cites several stressors, including turning 32 years old last week, as well as questioning his "identity" (pt declined to disclose details while in the hallway in the medical ED), and persistent chest tightness and "burning." Pt acknowledges that his "stress" could be contributing to his chest discomfort. Of note, pt medically cleared by ED providers. Pt reports being unemployed since July 2022. States he has been spending most of the day in bed, with low energy and low motivation. He reports poor concentration, sometimes has interrupted sleep. He sometimes goes 1-2 days without eating because he is afraid eating will worsen his chest burning/tightness. He reports anhedonia and sometimes feels hopeless. For the past few days, pt has been experiencing SI with thoughts of starving himself or stabbing himself with a knife at home. Pt states he fears that he will act on it at home. He denies manic or psychotic symptoms. He denies homicidal or violent ideation, intent, or plan. His Ativan and Propranolol are prescribed as TID, but he has only been taking these meds in the morning with his other morning meds (Lexapro and Pantoprazole). States he had a drink on his 32th birthday last week, but otherwise he hasn't been drinking alcohol. States he hasn't used marijuana in about 1 year. He denies other substance use."  Upon admission, per inpatient psychiatric assessment, the patient presented with severe anxiety and panic attacks, w/ comorbid depression and SI w/ plan. The pt. reported plan to starve or stab themselves at home. They denied plan or intent or to harm themselves on the unit. The patient's anxiety presented as future oriented, with ruminations, perseveration, intrusiveness, and somatization. The pt. complained of worsening GI Sx including GERD, chest tightness and esophageal blockage. Pt. also experienced N/V/D while admitted due to panic Sx related to medication changes. Pt. also reported psychosocial stressors and questioning they’re gender identity. While admitted, the pt. was encouraged to explore their identity given they were in a restricted and safe environment. The pt. preferred pronouns they/them. They were switched to a private room where they felt more comfortable. The pt. agreed with possible OCD diagnosis given description of anxiety Sx. They were agreeable to cross titration from Lexapro to Zoloft. Ativan was also titrated for immediate anxiety relief.   The pt. began to report attenuation of anxiety and GI Sx with titration of Zoloft. They then began to c/o restlessness, difficulty sleeping and irritability. The writer was concerned for possible medication-induced jose. The pt. agreed to trial lithium, but later declined. The pt. was closely monitored for additional manic Sx and none were observed. They believe those Sx were more so attributed to psychosocial stressors, medication changes and a disagreement with their wife while admitted. The pt. also reported feeling unwell, with N/V/D during Zoloft titration. They tested negative for COVID-19, CBC and CMP were WNL. Serotonin syndrome was also considered therefore Zoloft was tapered. Serotonin syndrome was ruled out, the patient’s Sx resolved. Patient’s anxiety and mood gradually stabilized. The pt. was no longer an acute risk and was appropriate for discharge.  Cross titration of Lexapro to Zoloft initiated: Zoloft titrated to 150mg initially and then tapered to 100mg. Ativan titrated to 1mg AM and 0.5mg in the afternoon and bedtime. Propranolol 20mg TID maintained. Atarax 50mg Q6h as needed for anxiety.  All medications administered with good effect and tolerability. Symptoms gradually improved over the course of hospitalization. The patient was made aware of the risks and benefits of each medication.   There were no behavioral problems on the unit.  Patient did not become agitated, did not require emergency intramuscular medications or seclusion/restraints, and did not self-harm on the unit. Patient remained actively engaged in treatment and participated in individual, group, and milieu therapy.  Patient got along appropriately with staff and peers.  The patient’s wife was contacted prior to discharge for safety planning and disposition planning. Family is supportive and available.     Suicidal and aggression risk assessments were performed on the day of discharge. The patient is at elevated chronic risk of self-harm due to an underlying anxiety and mood disorder.  They are not actively suicidal or manic, making them appropriate for less restrictive treatment as an outpatient without need for continued inpatient hospitalization. Protective factors for suicide include future orientation, social support, treatment engagement, med compliance, lack of access, lack of substance use, residential stability.  Risk factors include insomnia, anhedonia, psychosocial stressors, chronic SI, chronic illness, anxiety, depression, prior hospitalization.  Immediate risk was minimized by inpatient admission to a safe environment with appropriate supervision and limited access to lethal means. Future risk was minimized before discharge by treatment of anxiety/depressive symptoms, maximizing outpatient support, providing relevant patient education, discussing emergency procedures, and ensuring close follow-up. Patient denies all suicidal and aggressive/homicidal ideation, intent and plan, and, in view of demonstrated clinical improvement, is not judged to be an acute danger to self or others at this time. Although the patient remains at their chronic baseline risk of self-harm, such risk cannot be further ameliorated by continued inpatient treatment and the patient is therefore appropriate for discharge.   On the day of discharge, the patient’s mood and affect are normal/euthymic. Patient denies depressed mood, reports attenuated anxiety. Patient is not hopeless. Sleep and appetite are also normal (at baseline).  Pt’s motor performance and productivity of speech are WNL. Pt denies symptoms of psychosis including AH/VH with no apparent delusions (or is at baseline/not preoccupied with delusions).  Patient denies S/H/I/I/P.   Patient has made clinically meaningful progress during this hospitalization and has clearly benefited from medications and psychotherapy. On day of discharge, the patient has improved significantly and no longer requires inpatient treatment and care. Pt will be discharged and follow-up with outpatient care.    Patient was provided with extensive psychoeducation on treatment options and motivational counseling targeting healthy lifestyle. Patient was instructed on actions for crisis situations, understood and agreed to follow instructions for handling crisis, including coming to ER or calling 911 should the patient or their family feel that they are in danger of hurting self or others. Patient also was given Suicide Prevention Lifeline number 9-067-144-3435 and provided with instructions on its use.   Patient was advised to avoid driving until their reactions are not impaired by medications. Motivational counseling was provided. Family is supportive and was provided with similar safety plan instructions.   Patient will be discharged with the following DSM5 Diagnoses:    PRINCIPAL DISCHARGE DIAGNOSIS  Diagnosis: Severe episode of recurrent major depressive disorder, without psychotic features    SECONDARY DISCHARGE DIAGNOSES  Diagnosis: Post traumatic stress disorder (PTSD)  Diagnosis: Generalized anxiety disorder with panic attacks  r/o OCD  Discharge medications:    Discharge medications:  hydrOXYzine hydrochloride 50 mg oral tablet: 1 tab(s) orally every 6 hours, As needed, Anxiety  LORazepam 0.5 mg oral tablet: 1 tab(s) orally 2 times a day in the afternoon and before bedtime MDD:2mg  LORazepam 1 mg oral tablet: 1 tab(s) orally once a day MDD:2mg  propranolol 20 mg oral tablet: 1 tab(s) orally 3 times a day  sertraline 100 mg oral tablet: 1 tab(s) orally once a day    Handoff emailed to Dr. Escamilla at [facility] including discussion of hospital course, treatment, and medications. Appointments are as follows:  Aftercare Appointment  [facility] - appointment with Sandra.  05-Dec-2022 11:30  Virtual Appointment  [phone]  Mental Health Treatment  ZOOM ID:[id number]  Other  [facility]  	[phone]

## 2022-11-29 NOTE — BH INPATIENT PSYCHIATRY PROGRESS NOTE - CURRENT MEDICATION
MEDICATIONS  (STANDING):  hydrOXYzine hydrochloride 50 milliGRAM(s) Oral at bedtime  influenza   Vaccine 0.5 milliLiter(s) IntraMuscular once  LORazepam     Tablet 0.5 milliGRAM(s) Oral <User Schedule>  pantoprazole    Tablet 40 milliGRAM(s) Oral before breakfast  propranolol 20 milliGRAM(s) Oral three times a day  sertraline 100 milliGRAM(s) Oral daily    MEDICATIONS  (PRN):  albuterol    90 MICROgram(s) HFA Inhaler 2 Puff(s) Inhalation every 6 hours PRN asthma  bismuth subsalicylate Liquid 30 milliLiter(s) Oral every 4 hours PRN N/V  diphenhydrAMINE Injectable 50 milliGRAM(s) IntraMuscular once PRN Agitation  haloperidol     Tablet 5 milliGRAM(s) Oral every 6 hours PRN Agitation  haloperidol    Injectable 5 milliGRAM(s) IntraMuscular once PRN Agitation  hydrOXYzine hydrochloride 50 milliGRAM(s) Oral every 6 hours PRN Anxiety  ondansetron    Tablet 4 milliGRAM(s) Oral every 8 hours PRN nausea

## 2022-11-29 NOTE — BH INPATIENT PSYCHIATRY PROGRESS NOTE - NSBHCHARTREVIEWVS_PSY_A_CORE FT
Vital Signs Last 24 Hrs  T(C): 36.3 (11-29-22 @ 06:23), Max: 36.7 (11-28-22 @ 22:13)  T(F): 97.4 (11-29-22 @ 06:23), Max: 98 (11-28-22 @ 22:13)  HR: --  BP: --  BP(mean): --  RR: --  SpO2: --    Orthostatic VS  11-29-22 @ 08:02  Lying BP: --/-- HR: --  Sitting BP: 120/78 HR: 66  Standing BP: 112/76 HR: 80  Site: --  Mode: --  Orthostatic VS  11-28-22 @ 19:16  Lying BP: --/-- HR: --  Sitting BP: 117/66 HR: 69  Standing BP: 115/69 HR: 82  Site: --  Mode: --  Orthostatic VS  11-28-22 @ 08:38  Lying BP: --/-- HR: --  Sitting BP: 124/68 HR: 75  Standing BP: 123/76 HR: 76  Site: --  Mode: --

## 2022-11-29 NOTE — BH INPATIENT PSYCHIATRY PROGRESS NOTE - ADDITIONAL DETAILS / COMMENTS
pt unable to state what exactly triggers somatic symptoms. focuses on meds being a potential reason for gi symptoms, but acknowledges that anxiety can be a contributing factor
pt able to state when anxiety and GI symptoms began, but tends to spiral when discussing possible causes of symptoms, naming several possibilities 
pt unable to state what exact trigger for anxiety for. focuses on meds being a potential reason for changes in mood
pt unable to state what exactly triggers somatic symptoms. focuses on meds being a potential reason for gi symptoms, but acknowledges that anxiety can be a contributing factor
pt unable to state what exact trigger for anxiety for. focuses on meds being a potential reason for changes in mood
pt able to state when anxiety and GI symptoms began, but tends to spiral when discussing possible causes of symptoms, naming several possibilities

## 2022-11-29 NOTE — BH INPATIENT PSYCHIATRY PROGRESS NOTE - NSBHATTESTBILLINGAW_PSY_A_CORE
[id number]-Qxufxqkjjn Inpatient care - moderate complexity - 25 minutes
[id number]-Amvwqdseoq Inpatient care - moderate complexity - 25 minutes
[id number]-Emgrsbsbga Inpatient care - moderate complexity - 25 minutes
[id number]-Rnrheblgtj Inpatient care - moderate complexity - 25 minutes
70564-Fdemmsfuzz Inpatient care - moderate complexity - 25 minutes
[id number]-Cjstevofpq Inpatient care - low complexity - 15 minutes
[id number]-Itqkfzxrgs Inpatient care - moderate complexity - 25 minutes
[id number]-Mvsqjvvhha Inpatient care - moderate complexity - 25 minutes
01340-Agnopmpbsx Inpatient care - low complexity - 15 minutes
75731-Dwwlwavuxy Inpatient care - low complexity - 15 minutes
50108-Akwwtchkxz Inpatient care - moderate complexity - 25 minutes
79563-Hxswlcwkey Inpatient care - low complexity - 15 minutes
89451-Hqcffeobek Inpatient care - moderate complexity - 25 minutes
[id number]-Ptlkutiakd Inpatient care - low complexity - 15 minutes
54287-Qkyzowjdos Inpatient care - moderate complexity - 25 minutes
[id number]-Jjdoqtrpzg Inpatient care - moderate complexity - 25 minutes

## 2022-11-29 NOTE — BH INPATIENT PSYCHIATRY PROGRESS NOTE - PRN MEDS
MEDICATIONS  (PRN):  albuterol    90 MICROgram(s) HFA Inhaler 2 Puff(s) Inhalation every 6 hours PRN asthma  bismuth subsalicylate Liquid 30 milliLiter(s) Oral every 4 hours PRN N/V  diphenhydrAMINE Injectable 50 milliGRAM(s) IntraMuscular once PRN Agitation  haloperidol     Tablet 5 milliGRAM(s) Oral every 6 hours PRN Agitation  haloperidol    Injectable 5 milliGRAM(s) IntraMuscular once PRN Agitation  hydrOXYzine hydrochloride 50 milliGRAM(s) Oral every 6 hours PRN Anxiety  ondansetron    Tablet 4 milliGRAM(s) Oral every 8 hours PRN nausea

## 2022-11-29 NOTE — BH PSYCHOLOGY - GROUP THERAPY NOTE - NSBHPSYCHOLPARTICIPCOMMENT_PSY_A_CORE FT
Pt was engaged in group discussion, responded to others appropriately, and meaningfully contributed to group discussion. Thought processes linear, speech within normal limits, and oriented x3. 
Pt was engaged in group discussion, responded to others appropriately, and meaningfully contributed to group discussion. Thought processes linear, speech within normal limits, and oriented x3. 
Pt actively and appropriately participated.
Pt was engaged in group discussion, responded to others appropriately, and meaningfully contributed to group discussion. Thought processes linear, speech within normal limits, and oriented x3. 

## 2022-11-29 NOTE — BH INPATIENT PSYCHIATRY PROGRESS NOTE - NSTXDCOTHRDATEEST_PSY_ALL_CORE
10-Nov-2022
25-Nov-2022
10-Nov-2022
25-Nov-2022
10-Nov-2022
10-Nov-2022
25-Nov-2022
10-Nov-2022

## 2022-11-29 NOTE — BH PSYCHOLOGY - GROUP THERAPY NOTE - NSPSYCHOLGRPCOGGOAL_PSY_A_CORE FT
Decrease symptoms, mindfulness, develop coping/emotion regulation skills, psychoeducation

## 2022-11-29 NOTE — BH INPATIENT PSYCHIATRY PROGRESS NOTE - NSTXDCOTHRDATETRGT_PSY_ALL_CORE
17-Nov-2022
02-Dec-2022
02-Dec-2022
17-Nov-2022
02-Dec-2022
17-Nov-2022

## 2022-11-29 NOTE — BH INPATIENT PSYCHIATRY PROGRESS NOTE - NSTXSUICIDDATETRGT_PSY_ALL_CORE
16-Nov-2022

## 2022-11-29 NOTE — BH INPATIENT PSYCHIATRY PROGRESS NOTE - NSBHATTESTTYPEVISIT_PSY_A_CORE
LOS without on-site Attending supervision
On-site Attending supervising LOS (99XXX codes)
Attending with Resident/Fellow/Student
LOS without on-site Attending supervision
Attending with Resident/Fellow/Student
On-site Attending supervising LOS (99XXX codes)
On-site Attending supervising LOS (99XXX codes)
Attending with Resident/Fellow/Student
On-site Attending supervising LOS (99XXX codes)

## 2022-11-29 NOTE — BH INPATIENT PSYCHIATRY PROGRESS NOTE - NSTXDEPRESDATETRGT_PSY_ALL_CORE
23-Nov-2022
30-Nov-2022
01-Dec-2022
24-Nov-2022
17-Nov-2022
24-Nov-2022
23-Nov-2022
01-Dec-2022
01-Dec-2022
24-Nov-2022
24-Nov-2022
17-Nov-2022
23-Nov-2022

## 2022-11-29 NOTE — BH PSYCHOLOGY - GROUP THERAPY NOTE - NSBHPTASSESSDT_PSY_A_CORE
11-Nov-2022 10:15
28-Nov-2022 10:15
17-Nov-2022 10:15
18-Nov-2022 10:15
22-Nov-2022 10:15
14-Nov-2022 10:15
29-Nov-2022 10:15

## 2022-11-29 NOTE — BH INPATIENT PSYCHIATRY DISCHARGE NOTE - ATTENDING DISCHARGE PHYSICAL EXAMINATION:
On day of discharge, patient is calm and cooperative, improving insight and judgment, medication compliant and reporting benefit, reporting intention to continue taking medication and engaging in treatment, reviewed contributing factors to hospitalization, reviewed ongoing outside stressors, denies SI/HI, appropriate safety planning, no overt delusions or evidence of ongoing AH, reports and demonstrates improved mood and anxiety, chronically elevated risk though acute risk no longer sufficiently elevated to require inpatient psychiatric hospitalization, appropriate for transition to outpatient follow-up as arranged.

## 2022-11-29 NOTE — BH INPATIENT PSYCHIATRY PROGRESS NOTE - NSBHCONSDANGERSELF_PSY_A_CORE
suicidal ideation with plan and means

## 2022-11-29 NOTE — BH INPATIENT PSYCHIATRY DISCHARGE NOTE - NSDCCPCAREPLAN_GEN_ALL_CORE_FT
PRINCIPAL DISCHARGE DIAGNOSIS  Diagnosis: Severe episode of recurrent major depressive disorder, without psychotic features  Assessment and Plan of Treatment:       SECONDARY DISCHARGE DIAGNOSES  Diagnosis: Post traumatic stress disorder (PTSD)  Assessment and Plan of Treatment:     Diagnosis: Generalized anxiety disorder with panic attacks  Assessment and Plan of Treatment:

## 2022-11-29 NOTE — BH PSYCHOLOGY - GROUP THERAPY NOTE - NSBHPSYCHOLASSESSPROV_PSY_A_CORE
Psychology Trainee only
Licensed Psychologist and Psychology Trainee
Psychology Trainee only
Licensed Psychologist
Licensed Psychologist and Psychology Trainee
Psychology Trainee only
Licensed Psychologist and Psychology Trainee

## 2022-11-29 NOTE — BH INPATIENT PSYCHIATRY PROGRESS NOTE - NSBHCONSBHPROVDETAILS_PSY_A_CORE  FT
Sera Escamilla emailed
Sera Esacmilla emailed
Sera Escamilla emailed

## 2022-11-29 NOTE — BH INPATIENT PSYCHIATRY PROGRESS NOTE - NSCGIIMPROVESX_PSY_ALL_CORE
4 = No change - symptoms remain essentially unchanged
2 = Much improved - notably better with signficant reduction of symptoms; increase in the level of functioning but some symptoms remain
3 = Minimally improved - slightly better with little or no clinically meaningful reduction of symptoms.  Represents very little change in basic clinical status, level of care, or functional capacity.

## 2022-11-29 NOTE — BH INPATIENT PSYCHIATRY DISCHARGE NOTE - HPI (INCLUDE ILLNESS QUALITY, SEVERITY, DURATION, TIMING, CONTEXT, MODIFYING FACTORS, ASSOCIATED SIGNS AND SYMPTOMS)
32-year-old  male unemployed living with spouse and her family, with diagnosis of MDD with history of one prior psychiatric hospitalization at [facility] from 7/21-7/29/2022 for suicidal thoughts, treated at [facility] since Aug 2022, currently on Lexapro, Ativan, and Propranolol, no history of self-injurious behavior or suicide attempts, no h/o violence or legal issues, PMH GERD and Asthma, no h/o substance abuse, referred by [facility] providers for SI and worsening depression/anxiety.     Collateral from [facility] SW, Sera Escamilla: "He had been doing ok managing his symptoms compliant with treatment until about two weeks ago. He began to feel more anxious with an increase in somatic complaints. He reported feeling more easily overwhelmed and having difficulty taking care of ADLs spending more time in his room isolating. He had some increased stress surrounding his birthday last week turning 32 and his second wedding anniversary. He mentioned some new concerns about his gender identity. He had difficulty enjoying the plans made to celebrate and did not follow through on some of them, telling his wife that he just wasn't  up to going out.. He reported an increase in pain related to stomach issue and became more hyper focused on these complaints. He reached out for additional support several times in between session. He denied any suicidal thoughts but was feeling more hopeless and helpless last week.   Today pt reaches out reporting suicidal thoughts with new complaint of increased pain " in my esophagus". He reports pain to be unmanageable and he "can't take it anymore". He reported suicidal thoughts of " taking my army knife that I have here and stabbing myself" or overdose on medications. He was tearful and guilty about how he was feeling stating "I know I'm letting people down" but he was uncertain if after our zoom session he would be able to keep himself safe and not act on these thoughts. He agreed to ask a family member to take him to the ER and was requesting admission. He agreed to not act on any thoughts.  He is on prescribed medications as follows: Lexapro 20 MG, TAB, PO, Take one (1) tablet by mouth daily  LORazepam 0.5 MG, TAB, PO, Take one (1) tablet by mouth three times a day  Propranolol HCl 20 MG, TAB, PO, Take one (1) tablet by mouth three times a day  Protonix 40 MG, TCP, PO, Take one (1) tablet by mouth every morning, before meal"    On interview, pt states he was referred to ED by [facility] providers because "I don't feel safe." Pt reports worsening depression and anxiety over the past few weeks. He cites several stressors, including turning 32 years old last week, as well as questioning his "identity" (pt declined to disclose details while in the hallway in the medical ED), and persistent chest tightness and "burning." Pt acknowledges that his "stress" could be contributing to his chest discomfort. Of note, pt medically cleared by ED providers. Pt reports being unemployed since July 2022. States he has been spending most of the day in bed, with low energy and low motivation. He reports poor concentration, sometimes has interrupted sleep. He sometimes goes 1-2 days without eating because he is afraid eating will worsen his chest burning/tightness. He reports anhedonia and sometimes feels hopeless. For the past few days, pt has been experiencing SI with thoughts of starving himself or stabbing himself with a knife at home. Pt states he fears that he will act on it at home. He denies manic or psychotic symptoms. He denies homicidal or violent ideation, intent, or plan. His Ativan and Propranolol are prescribed as TID, but he has only been taking these meds in the morning with his other morning meds (Lexapro and Pantoprazole). States he had a drink on his 32th birthday last week, but otherwise he hasn't been drinking alcohol. States he hasn't used marijuana in about 1 year. He denies other substance use.

## 2022-12-05 PROCEDURE — 90832 PSYTX W PT 30 MINUTES: CPT | Mod: 95

## 2022-12-05 PROCEDURE — 99214 OFFICE O/P EST MOD 30 MIN: CPT | Mod: 95

## 2022-12-06 NOTE — SOCIAL WORK POST DISCHARGE FOLLOW UP NOTE - NSBHSWFOLLOWUP_PSY_ALL_CORE_FT
Lmsw called pt [phone] to offer assistance rescheduling follow up appointment. pt reports they did attend the appointment yesterday and that there must be a miscommunication.  At the time of dc the treatment team determined the patient was not a risk to themselves or others. This case is closed.

## 2022-12-07 PROCEDURE — 90832 PSYTX W PT 30 MINUTES: CPT | Mod: 95

## 2022-12-19 PROCEDURE — 90832 PSYTX W PT 30 MINUTES: CPT | Mod: 95

## 2022-12-21 PROCEDURE — 90834 PSYTX W PT 45 MINUTES: CPT | Mod: 95

## 2022-12-22 PROCEDURE — 99214 OFFICE O/P EST MOD 30 MIN: CPT | Mod: 95

## 2022-12-28 PROCEDURE — 90832 PSYTX W PT 30 MINUTES: CPT | Mod: 95

## 2022-12-29 PROCEDURE — 99214 OFFICE O/P EST MOD 30 MIN: CPT | Mod: 95

## 2023-01-04 PROCEDURE — 90832 PSYTX W PT 30 MINUTES: CPT | Mod: 95

## 2023-02-06 PROCEDURE — 90832 PSYTX W PT 30 MINUTES: CPT | Mod: 95

## 2023-02-08 PROCEDURE — 90832 PSYTX W PT 30 MINUTES: CPT | Mod: 95

## 2023-02-15 PROCEDURE — 99214 OFFICE O/P EST MOD 30 MIN: CPT | Mod: 95

## 2023-02-22 PROCEDURE — 90832 PSYTX W PT 30 MINUTES: CPT | Mod: 95

## 2023-03-01 PROCEDURE — 90832 PSYTX W PT 30 MINUTES: CPT | Mod: 95

## 2023-03-08 PROCEDURE — 90832 PSYTX W PT 30 MINUTES: CPT | Mod: 95

## 2023-03-15 PROCEDURE — 99214 OFFICE O/P EST MOD 30 MIN: CPT | Mod: 95

## 2023-03-15 PROCEDURE — 90832 PSYTX W PT 30 MINUTES: CPT | Mod: 95

## 2023-03-21 NOTE — DIETITIAN INITIAL EVALUATION ADULT - FLUID ACCUMULATION
no edema Advancement Flap (Double) Text: The defect edges were debeveled with a #15 scalpel blade.  Given the location of the defect and the proximity to free margins a double advancement flap was deemed most appropriate.  Using a sterile surgical marker, the appropriate advancement flaps were drawn incorporating the defect and placing the expected incisions within the relaxed skin tension lines where possible.    The area thus outlined was incised deep to adipose tissue with a #15 scalpel blade.  The skin margins were undermined to an appropriate distance in all directions utilizing iris scissors.

## 2023-03-22 PROCEDURE — 90832 PSYTX W PT 30 MINUTES: CPT | Mod: 95

## 2023-03-29 PROCEDURE — 90832 PSYTX W PT 30 MINUTES: CPT | Mod: 95

## 2023-04-05 PROCEDURE — 99214 OFFICE O/P EST MOD 30 MIN: CPT | Mod: 95

## 2023-04-05 PROCEDURE — 90832 PSYTX W PT 30 MINUTES: CPT | Mod: 95

## 2023-04-26 PROCEDURE — 90832 PSYTX W PT 30 MINUTES: CPT | Mod: 95

## 2023-05-03 PROCEDURE — 90832 PSYTX W PT 30 MINUTES: CPT | Mod: 95

## 2023-05-06 NOTE — H&P ADULT - NSHPSOCIALHISTORY_GEN_ALL_CORE
tired appearing 32 yo male  and living w/ wife's parents and siblings. Independent in ADLs. 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 Drinks 1 beer a night, never exceeds 6 drinks in a sitting. Denies smoking history. Smoke marijuana 4 times a week, last being 3 days ago. Has been taking old Klonopin 0/25mg prescription for the past 5 days. Denies recent travel or sick contacts. Covid vax x2 w/ Moderna in 5/2021.

## 2023-05-10 PROCEDURE — 90832 PSYTX W PT 30 MINUTES: CPT | Mod: 95

## 2023-05-20 NOTE — BH INPATIENT PSYCHIATRY PROGRESS NOTE - NSICDXBHPRIMARYDX_PSY_ALL_CORE
Severe episode of recurrent major depressive disorder, without psychotic features   F33.2   Normal for race

## 2023-10-12 NOTE — BH PSYCHOLOGY - CLINICIAN PSYCHOTHERAPY NOTE - NSBHPSYCHOLNARRATIVE_PSY_A_CORE FT
Addended by: FIDE SANON on: 10/12/2023 10:36 AM     Modules accepted: Orders     Writer and patient wore masks during the session due to COVID precautions. Pt was alert and appeared to have adequate hygiene and grooming. Pt denied any current suicidal ideation, intent, or plan, and did not endorse any homicidal ideation. Pt reported "turbulent" mood and displayed anxious/depressed affect. Speech within normal limits. Thought processes linear and goal directed. Pt denied any auditory or visual hallucinations. Oriented x3. Pt demonstrated good insight and judgment.     Writer and pt discussed the value of acceptance, as opposed to battling feelings of anxiety. Role of uncertainty in gender identity, and related fear, explored. Pt reflected on appropriate use of coping skills to manage anxiety.  Support, validation, and empathic listening provided.

## 2023-10-17 ENCOUNTER — EMERGENCY (EMERGENCY)
Facility: HOSPITAL | Age: 33
LOS: 1 days | Discharge: HOME | End: 2023-10-17
Attending: EMERGENCY MEDICINE | Admitting: EMERGENCY MEDICINE
Payer: COMMERCIAL

## 2023-10-17 VITALS
DIASTOLIC BLOOD PRESSURE: 89 MMHG | HEART RATE: 85 BPM | TEMPERATURE: 99 F | OXYGEN SATURATION: 100 % | RESPIRATION RATE: 16 BRPM | SYSTOLIC BLOOD PRESSURE: 148 MMHG

## 2023-10-17 VITALS
DIASTOLIC BLOOD PRESSURE: 72 MMHG | RESPIRATION RATE: 16 BRPM | TEMPERATURE: 99 F | HEART RATE: 60 BPM | OXYGEN SATURATION: 97 % | SYSTOLIC BLOOD PRESSURE: 116 MMHG

## 2023-10-17 DIAGNOSIS — Z98.890 OTHER SPECIFIED POSTPROCEDURAL STATES: Chronic | ICD-10-CM

## 2023-10-17 DIAGNOSIS — Z87.2 PERSONAL HISTORY OF DISEASES OF THE SKIN AND SUBCUTANEOUS TISSUE: Chronic | ICD-10-CM

## 2023-10-17 LAB
ALBUMIN SERPL ELPH-MCNC: 4.5 G/DL — SIGNIFICANT CHANGE UP (ref 3.3–5)
ALBUMIN SERPL ELPH-MCNC: 4.5 G/DL — SIGNIFICANT CHANGE UP (ref 3.3–5)
ALP SERPL-CCNC: 89 U/L — SIGNIFICANT CHANGE UP (ref 40–120)
ALP SERPL-CCNC: 89 U/L — SIGNIFICANT CHANGE UP (ref 40–120)
ALT FLD-CCNC: 21 U/L — SIGNIFICANT CHANGE UP (ref 4–41)
ALT FLD-CCNC: 21 U/L — SIGNIFICANT CHANGE UP (ref 4–41)
ANION GAP SERPL CALC-SCNC: 12 MMOL/L — SIGNIFICANT CHANGE UP (ref 7–14)
ANION GAP SERPL CALC-SCNC: 12 MMOL/L — SIGNIFICANT CHANGE UP (ref 7–14)
AST SERPL-CCNC: 16 U/L — SIGNIFICANT CHANGE UP (ref 4–40)
AST SERPL-CCNC: 16 U/L — SIGNIFICANT CHANGE UP (ref 4–40)
BASOPHILS # BLD AUTO: 0.04 K/UL — SIGNIFICANT CHANGE UP (ref 0–0.2)
BASOPHILS # BLD AUTO: 0.04 K/UL — SIGNIFICANT CHANGE UP (ref 0–0.2)
BASOPHILS NFR BLD AUTO: 0.6 % — SIGNIFICANT CHANGE UP (ref 0–2)
BASOPHILS NFR BLD AUTO: 0.6 % — SIGNIFICANT CHANGE UP (ref 0–2)
BILIRUB SERPL-MCNC: 0.4 MG/DL — SIGNIFICANT CHANGE UP (ref 0.2–1.2)
BILIRUB SERPL-MCNC: 0.4 MG/DL — SIGNIFICANT CHANGE UP (ref 0.2–1.2)
BUN SERPL-MCNC: 13 MG/DL — SIGNIFICANT CHANGE UP (ref 7–23)
BUN SERPL-MCNC: 13 MG/DL — SIGNIFICANT CHANGE UP (ref 7–23)
CALCIUM SERPL-MCNC: 9.3 MG/DL — SIGNIFICANT CHANGE UP (ref 8.4–10.5)
CALCIUM SERPL-MCNC: 9.3 MG/DL — SIGNIFICANT CHANGE UP (ref 8.4–10.5)
CHLORIDE SERPL-SCNC: 103 MMOL/L — SIGNIFICANT CHANGE UP (ref 98–107)
CHLORIDE SERPL-SCNC: 103 MMOL/L — SIGNIFICANT CHANGE UP (ref 98–107)
CO2 SERPL-SCNC: 27 MMOL/L — SIGNIFICANT CHANGE UP (ref 22–31)
CO2 SERPL-SCNC: 27 MMOL/L — SIGNIFICANT CHANGE UP (ref 22–31)
CREAT SERPL-MCNC: 0.96 MG/DL — SIGNIFICANT CHANGE UP (ref 0.5–1.3)
CREAT SERPL-MCNC: 0.96 MG/DL — SIGNIFICANT CHANGE UP (ref 0.5–1.3)
EGFR: 109 ML/MIN/1.73M2 — SIGNIFICANT CHANGE UP
EGFR: 109 ML/MIN/1.73M2 — SIGNIFICANT CHANGE UP
EOSINOPHIL # BLD AUTO: 0.27 K/UL — SIGNIFICANT CHANGE UP (ref 0–0.5)
EOSINOPHIL # BLD AUTO: 0.27 K/UL — SIGNIFICANT CHANGE UP (ref 0–0.5)
EOSINOPHIL NFR BLD AUTO: 3.7 % — SIGNIFICANT CHANGE UP (ref 0–6)
EOSINOPHIL NFR BLD AUTO: 3.7 % — SIGNIFICANT CHANGE UP (ref 0–6)
FLUAV AG NPH QL: SIGNIFICANT CHANGE UP
FLUAV AG NPH QL: SIGNIFICANT CHANGE UP
FLUBV AG NPH QL: SIGNIFICANT CHANGE UP
FLUBV AG NPH QL: SIGNIFICANT CHANGE UP
GLUCOSE SERPL-MCNC: 109 MG/DL — HIGH (ref 70–99)
GLUCOSE SERPL-MCNC: 109 MG/DL — HIGH (ref 70–99)
HCT VFR BLD CALC: 45.8 % — SIGNIFICANT CHANGE UP (ref 39–50)
HCT VFR BLD CALC: 45.8 % — SIGNIFICANT CHANGE UP (ref 39–50)
HGB BLD-MCNC: 15.5 G/DL — SIGNIFICANT CHANGE UP (ref 13–17)
HGB BLD-MCNC: 15.5 G/DL — SIGNIFICANT CHANGE UP (ref 13–17)
HIV 1+2 AB+HIV1 P24 AG SERPL QL IA: SIGNIFICANT CHANGE UP
HIV 1+2 AB+HIV1 P24 AG SERPL QL IA: SIGNIFICANT CHANGE UP
IANC: 4.71 K/UL — SIGNIFICANT CHANGE UP (ref 1.8–7.4)
IANC: 4.71 K/UL — SIGNIFICANT CHANGE UP (ref 1.8–7.4)
IMM GRANULOCYTES NFR BLD AUTO: 0.3 % — SIGNIFICANT CHANGE UP (ref 0–0.9)
IMM GRANULOCYTES NFR BLD AUTO: 0.3 % — SIGNIFICANT CHANGE UP (ref 0–0.9)
LYMPHOCYTES # BLD AUTO: 1.79 K/UL — SIGNIFICANT CHANGE UP (ref 1–3.3)
LYMPHOCYTES # BLD AUTO: 1.79 K/UL — SIGNIFICANT CHANGE UP (ref 1–3.3)
LYMPHOCYTES # BLD AUTO: 24.7 % — SIGNIFICANT CHANGE UP (ref 13–44)
LYMPHOCYTES # BLD AUTO: 24.7 % — SIGNIFICANT CHANGE UP (ref 13–44)
MAGNESIUM SERPL-MCNC: 2.2 MG/DL — SIGNIFICANT CHANGE UP (ref 1.6–2.6)
MAGNESIUM SERPL-MCNC: 2.2 MG/DL — SIGNIFICANT CHANGE UP (ref 1.6–2.6)
MCHC RBC-ENTMCNC: 29.9 PG — SIGNIFICANT CHANGE UP (ref 27–34)
MCHC RBC-ENTMCNC: 29.9 PG — SIGNIFICANT CHANGE UP (ref 27–34)
MCHC RBC-ENTMCNC: 33.8 GM/DL — SIGNIFICANT CHANGE UP (ref 32–36)
MCHC RBC-ENTMCNC: 33.8 GM/DL — SIGNIFICANT CHANGE UP (ref 32–36)
MCV RBC AUTO: 88.4 FL — SIGNIFICANT CHANGE UP (ref 80–100)
MCV RBC AUTO: 88.4 FL — SIGNIFICANT CHANGE UP (ref 80–100)
MONOCYTES # BLD AUTO: 0.41 K/UL — SIGNIFICANT CHANGE UP (ref 0–0.9)
MONOCYTES # BLD AUTO: 0.41 K/UL — SIGNIFICANT CHANGE UP (ref 0–0.9)
MONOCYTES NFR BLD AUTO: 5.7 % — SIGNIFICANT CHANGE UP (ref 2–14)
MONOCYTES NFR BLD AUTO: 5.7 % — SIGNIFICANT CHANGE UP (ref 2–14)
NEUTROPHILS # BLD AUTO: 4.71 K/UL — SIGNIFICANT CHANGE UP (ref 1.8–7.4)
NEUTROPHILS # BLD AUTO: 4.71 K/UL — SIGNIFICANT CHANGE UP (ref 1.8–7.4)
NEUTROPHILS NFR BLD AUTO: 65 % — SIGNIFICANT CHANGE UP (ref 43–77)
NEUTROPHILS NFR BLD AUTO: 65 % — SIGNIFICANT CHANGE UP (ref 43–77)
NRBC # BLD: 0 /100 WBCS — SIGNIFICANT CHANGE UP (ref 0–0)
NRBC # BLD: 0 /100 WBCS — SIGNIFICANT CHANGE UP (ref 0–0)
NRBC # FLD: 0 K/UL — SIGNIFICANT CHANGE UP (ref 0–0)
NRBC # FLD: 0 K/UL — SIGNIFICANT CHANGE UP (ref 0–0)
PLATELET # BLD AUTO: 296 K/UL — SIGNIFICANT CHANGE UP (ref 150–400)
PLATELET # BLD AUTO: 296 K/UL — SIGNIFICANT CHANGE UP (ref 150–400)
POTASSIUM SERPL-MCNC: 4.4 MMOL/L — SIGNIFICANT CHANGE UP (ref 3.5–5.3)
POTASSIUM SERPL-MCNC: 4.4 MMOL/L — SIGNIFICANT CHANGE UP (ref 3.5–5.3)
POTASSIUM SERPL-SCNC: 4.4 MMOL/L — SIGNIFICANT CHANGE UP (ref 3.5–5.3)
POTASSIUM SERPL-SCNC: 4.4 MMOL/L — SIGNIFICANT CHANGE UP (ref 3.5–5.3)
PROT SERPL-MCNC: 7.8 G/DL — SIGNIFICANT CHANGE UP (ref 6–8.3)
PROT SERPL-MCNC: 7.8 G/DL — SIGNIFICANT CHANGE UP (ref 6–8.3)
RBC # BLD: 5.18 M/UL — SIGNIFICANT CHANGE UP (ref 4.2–5.8)
RBC # BLD: 5.18 M/UL — SIGNIFICANT CHANGE UP (ref 4.2–5.8)
RBC # FLD: 12.5 % — SIGNIFICANT CHANGE UP (ref 10.3–14.5)
RBC # FLD: 12.5 % — SIGNIFICANT CHANGE UP (ref 10.3–14.5)
RSV RNA NPH QL NAA+NON-PROBE: DETECTED
RSV RNA NPH QL NAA+NON-PROBE: DETECTED
SARS-COV-2 RNA SPEC QL NAA+PROBE: SIGNIFICANT CHANGE UP
SARS-COV-2 RNA SPEC QL NAA+PROBE: SIGNIFICANT CHANGE UP
SODIUM SERPL-SCNC: 142 MMOL/L — SIGNIFICANT CHANGE UP (ref 135–145)
SODIUM SERPL-SCNC: 142 MMOL/L — SIGNIFICANT CHANGE UP (ref 135–145)
WBC # BLD: 7.24 K/UL — SIGNIFICANT CHANGE UP (ref 3.8–10.5)
WBC # BLD: 7.24 K/UL — SIGNIFICANT CHANGE UP (ref 3.8–10.5)
WBC # FLD AUTO: 7.24 K/UL — SIGNIFICANT CHANGE UP (ref 3.8–10.5)
WBC # FLD AUTO: 7.24 K/UL — SIGNIFICANT CHANGE UP (ref 3.8–10.5)

## 2023-10-17 PROCEDURE — 99284 EMERGENCY DEPT VISIT MOD MDM: CPT

## 2023-10-17 RX ORDER — ACETAMINOPHEN 500 MG
975 TABLET ORAL ONCE
Refills: 0 | Status: COMPLETED | OUTPATIENT
Start: 2023-10-17 | End: 2023-10-17

## 2023-10-17 RX ORDER — ONDANSETRON 8 MG/1
1 TABLET, FILM COATED ORAL
Qty: 1 | Refills: 0
Start: 2023-10-17 | End: 2023-10-19

## 2023-10-17 RX ORDER — ONDANSETRON 8 MG/1
4 TABLET, FILM COATED ORAL ONCE
Refills: 0 | Status: COMPLETED | OUTPATIENT
Start: 2023-10-17 | End: 2023-10-17

## 2023-10-17 RX ADMIN — Medication 975 MILLIGRAM(S): at 08:46

## 2023-10-17 RX ADMIN — ONDANSETRON 4 MILLIGRAM(S): 8 TABLET, FILM COATED ORAL at 08:46

## 2023-10-17 NOTE — ED PROVIDER NOTE - OBJECTIVE STATEMENT
32-year-old male with a history of GERD, asthma presents with concern for increasing nasal congestion, shortness of breath, abdominal pain, nausea/vomiting, diarrhea.  Patient reports that since last night he has had cramping abdominal pain associated with 1 episode of significantly loose stool as well as 1 episode of nausea and vomiting.  Patient has felt like he has had to dry heave all morning.  He also woke up this morning with increasing shortness of breath requiring his rescue inhaler.  Patient has been congestion due to seasonal allergies but it has been increased over the last day as well.  Otherwise patient has no headache, chest pain, dysuria, peripheral edema, fever/chills.

## 2023-10-17 NOTE — ED PROVIDER NOTE - NSFOLLOWUPINSTRUCTIONS_ED_ALL_ED_FT
Please follow up with your primary care physician within 1-2 weeks of discharge from the emergency department.  Please bring a copy of your results with you.  Please return to the emergency department for worsening of your symptoms.    You may take Acetaminophen over the counter as needed for pain and/or fever. Use as directed and see medication warnings.  You may take Ibuprofen over the counter as needed for pain and/or fever. Use as directed and see medication warnings.    DISCHARGE INSTRUCTIONS:  Call your local emergency number (911 in the [place]) or have someone else call if:  You have a seizure.  You cannot be woken.  You have chest pain or trouble breathing.    Seek care immediately if:  You have a stiff neck, a bad headache, and sensitivity to light.  You feel weak, dizzy, or confused.  You stop urinating or urinate a lot less than usual.  You cough up blood or thick yellow or green mucus.  You have severe abdominal pain or your abdomen is larger than usual.    Call your primary doctor if:  Your symptoms get worse after 3 days.  You have a rash or ear pain.  You have burning when you urinate.  You have questions or concerns about your condition or care.

## 2023-10-17 NOTE — ED PROVIDER NOTE - CROS ED NEURO ALL NEG
[de-identified] : Various options were discussed with the patient.  His insurance company would not authorize Gel-One injection.  I discussed Euflexxa injections and gave him a pamphlet.  Risk benefits and the alternatives were discussed.  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

## 2023-10-17 NOTE — ED ADULT NURSE NOTE - NSFALLUNIVINTERV_ED_ALL_ED
Bed/Stretcher in lowest position, wheels locked, appropriate side rails in place/Call bell, personal items and telephone in reach/Instruct patient to call for assistance before getting out of bed/chair/stretcher/Non-slip footwear applied when patient is off stretcher/[place] to call system/Physically safe environment - no spills, clutter or unnecessary equipment/Purposeful proactive rounding/Room/bathroom lighting operational, light cord in reach

## 2023-10-17 NOTE — ED PROVIDER NOTE - PHYSICAL EXAMINATION
GENERAL: NAD  HEAD: normocephalic, atraumatic  HEENT: normal conjunctiva, oral mucosa moist, uvula midline, neck supple  CARDIAC: regular rate and rhythm, normal S1S2, no appreciable murmurs  PULM: speaking in full sentences, normal breath sounds, clear to ascultation bilaterally, no rales, rhonchi, wheezing  GI: abdomen nondistended, soft, nontender  :  no suprapubic tenderness  NEURO: moving all 4 extremities, no focal deficits, normal speech, AOx3  MSK: no peripheral edema, no calf tenderness b/l  SKIN: well-perfused, extremities warm  PSYCH: appropriate mood and affect

## 2023-10-17 NOTE — ED ADULT TRIAGE NOTE - CHIEF COMPLAINT QUOTE
Patient c/o vomiting. Thinks he ate something bad. Reports nausea and abdominal pain. No fevers/chills/CP. PMH GERD.

## 2023-10-17 NOTE — ED ADULT NURSE NOTE - OBJECTIVE STATEMENT
Pt received to Rm 13, A&Ox4. PMHx Gerd, anxiety. Pt endorsing nausea and vomiting since last night, denies blood in vomit. Pt believes he might have ate some food that had gone bad. Respirations even and unlabored. Denies fever, chills, chest pain, SOB. 20G IV established to left AC, labs drawn and sent, medicated as per MAR. Safety maintained, bed in lowest position. Pending lab results. No new orders at this time. Pt received to Rm 13, A&Ox4, ambulatory. PMHx Gerd, anxiety. Pt endorsing nausea and vomiting since last night, denies blood in vomit. Pt believes he might have ate some food that had gone bad, says "father-in law made omar" when symptoms started shortly after. Respirations even and unlabored. Denies fever, chills, chest pain, SOB. 20G IV established to left AC, labs drawn and sent, medicated as per MAR. Safety maintained, bed in lowest position. Pending lab results. No new orders at this time.

## 2023-10-17 NOTE — ED PROVIDER NOTE - ATTENDING CONTRIBUTION TO CARE
I performed a face to face evaluation of this patient and performed a full history and physical examination on the patient.  I agree with the resident's history, physical examination, and plan of the patient.  32-year-old male with a history of GERD, asthma presents with concern for increasing nasal congestion, shortness of breath, abdominal pain, nausea/vomiting, diarrhea.  Likely viral illness exacerbating asthma and causing vomiting/diarrhea.  Possible gastritis.  Labs, Tylenol, Zofran ordered.  Belly soft nontender, no rebound or guarding, pt visibly nauseous- for labs, meds, and reassess

## 2023-10-17 NOTE — ED ADULT TRIAGE NOTE - CCCP TRG CHIEF CMPLNT
Abdomen , soft, nontender, nondistended , no guarding or rigidity , no masses palpable , normal bowel sounds , Liver and Spleen,  no hepatosplenomegaly , liver nontender vomiting

## 2023-10-17 NOTE — ED PROVIDER NOTE - CLINICAL SUMMARY MEDICAL DECISION MAKING FREE TEXT BOX
32-year-old male with a history of GERD, asthma presents with concern for increasing nasal congestion, shortness of breath, abdominal pain, nausea/vomiting, diarrhea.  Likely viral illness exacerbating asthma and causing vomiting/diarrhea.  Possible gastritis.  Labs, Tylenol, Zofran ordered.

## 2023-10-17 NOTE — ED PROVIDER NOTE - PROGRESS NOTE DETAILS
Lab work WNL.  Zofran sent to patient's pharmacy.  Dispo to home with PMD follow-up. pt improved - for dc with pmd followup

## 2023-10-17 NOTE — ED PROVIDER NOTE - PATIENT PORTAL LINK FT
You can access the FollowMyHealth Patient Portal offered by [facility] by registering at the following website: [email or web address]. By joining [facility]’s FollowMyHealth portal, you will also be able to view your health information using other applications (apps) compatible with our system.

## 2023-10-18 NOTE — ED POST DISCHARGE NOTE - REASON FOR FOLLOW-UP
Other RSV: Detetcted. Patient contacted and discussed with patient results. Discussed with patient need to return to ED if symptoms don't continue to improve or recur or develops any new or worsening symptoms that are of concern.

## 2023-11-28 NOTE — ED PROVIDER NOTE - OBJECTIVE STATEMENT
Received call from [facility] radiology, pt had a CT abd wo cont ordered but need to add pelvis. Upon reviewing chart (pt seen by Marjorie Hurley on 11/21) better study will be to add W WO IV contrast. Discussed with patient and provider, both agreeable to proceed with new order pending insurance authorization.
30yo M hx of esophageal stricture vs achalasia requiring balloon dilation comes to ED for vomiting. 3 years ago had balloon dilation. 3 weeks ago started having some trouble swallowing solids. Pain in epigastrium, worse for last 1 week, unable to eat 2/2 vomiting and pain. Worse w/ laying flat and after eating. No abdominal surgeries, last BM this morning, no blood. Denies fever, sob.

## 2024-07-29 NOTE — ED PROVIDER NOTE - IV ALTEPLASE EXCL ABS HIDDEN
show Labs and imaging reviewed.  Patient nontoxic well-appearing.  I spoke to patient/family prior to discharge using  services.  Patient is feeling better.  Patient ambulating in ED.  Patient and family comfortable going home.  Family wants to take patient home.  Daughter is requesting cream for irritation to groin area.  Patient given prescription for antifungal.  I spoke to family informed to follow-up with PMD this week.  Patient/family comfortable with plan.  Family aware to return to ED for any concerning symptoms or concerns.  Patient will follow-up with PMD this week. Labs and imaging reviewed.  Patient nontoxic well-appearing.  I spoke to patient/family prior to discharge using  services.  Patient is feeling better.  Patient ambulating in ED.  Patient and family comfortable going home.  Family wants to take patient home.  Daughter is requesting cream for irritation to groin area.  Patient given prescription for antifungal.  I spoke to family informed to follow-up with PMD this week.  Patient/family comfortable with plan.  Family aware to return to ED for any concerning symptoms or concerns.  Patient will follow-up with PMD this week.  Patient with history of diabetes.  Patient with elevated glucose.  Patient is not in DKA.  Patient not acidotic.  No anion gap.

## 2024-12-19 NOTE — [facility] INPATIENT PSYCHIATRY ASSESSMENT NOTE - ACCESS TO [facility]
Visit Information    Have you changed or started any medications since your last visit including any over-the-counter medicines, vitamins, or herbal medicines? no   Have you stopped taking any of your medications? Is so, why? -  no  Are you having any side effects from any of your medications? - no    Have you seen any other physician or provider since your last visit?  no   Have you had any other diagnostic tests since your last visit?  yes - Labs   Have you been seen in the emergency room and/or had an admission in a hospital since we last saw you?  yes - [facility]   Have you had your routine dental cleaning in the past 6 months?  no     Do you have an active MyChart account? If no, what is the barrier?  Yes    No care team member to display    Medical History Review  Past Medical, Family, and Social History reviewed and does not contribute to the patient presenting condition    Health Maintenance   Topic Date Due    Depression Screen  Never done    HIV screen  Never done    Hepatitis C screen  Never done    Hepatitis B vaccine (2 of 3 - 19+ 3-dose series) 12/13/2018    Flu vaccine (1) Never done    COVID-19 Vaccine (1 - 2023-24 season) Never done    DTaP/Tdap/Td vaccine (2 - Td or Tdap) 11/15/2028    Varicella vaccine  Completed    Hepatitis A vaccine  Aged Out    Hib vaccine  Aged Out    HPV vaccine  Aged Out    Polio vaccine  Aged Out    Meningococcal (ACWY) vaccine  Aged Out    Pneumococcal 0-64 years Vaccine  Aged Out              No

## 2025-05-02 PROCEDURE — 90832 PSYTX W PT 30 MINUTES: CPT | Mod: 93

## 2025-09-05 PROCEDURE — 90832 PSYTX W PT 30 MINUTES: CPT | Mod: 93
